# Patient Record
Sex: MALE | Race: WHITE | NOT HISPANIC OR LATINO | Employment: OTHER | URBAN - METROPOLITAN AREA
[De-identification: names, ages, dates, MRNs, and addresses within clinical notes are randomized per-mention and may not be internally consistent; named-entity substitution may affect disease eponyms.]

---

## 2017-02-07 ENCOUNTER — OFFICE VISIT (OUTPATIENT)
Dept: DERMATOLOGY | Facility: CLINIC | Age: 77
End: 2017-02-07
Payer: COMMERCIAL

## 2017-02-07 DIAGNOSIS — L82.0 INFLAMED SEBORRHEIC KERATOSIS: Primary | ICD-10-CM

## 2017-02-07 DIAGNOSIS — Z85.820 HISTORY OF MELANOMA: ICD-10-CM

## 2017-02-07 DIAGNOSIS — L57.0 AK (ACTINIC KERATOSIS): ICD-10-CM

## 2017-02-07 PROCEDURE — 17110 DESTRUCTION B9 LES UP TO 14: CPT | Performed by: DERMATOLOGY

## 2017-02-07 PROCEDURE — 99203 OFFICE O/P NEW LOW 30 MIN: CPT | Mod: 25 | Performed by: DERMATOLOGY

## 2017-02-07 PROCEDURE — 17003 DESTRUCT PREMALG LES 2-14: CPT | Performed by: DERMATOLOGY

## 2017-02-07 PROCEDURE — 17000 DESTRUCT PREMALG LESION: CPT | Mod: 59 | Performed by: DERMATOLOGY

## 2017-02-07 NOTE — LETTER
2/7/2017      RE: Cleveland Long  [address] [facility] [address]       DERMATOLOGY CLINIC VISIT NOTE      DERMATOLOGY PROBLEM LIST:   1.  History of malignant melanoma on right zygomatic arch, removed by Dr. Junior in approximately the year 2000.  The patient followed with every 3-month skin checks for several years with Dr. Junior and Dr. Cassidy.   2.  Actinic keratoses, treated with cryotherapy.   3.  Seborrheic keratoses.   4.  Cherry angiomas.      HISTORY OF PRESENT ILLNESS:  Mr. Long is a 76-year-old male presenting to Dermatology Clinic for an initial skin check.  As in the dermatology problem list, he has past history of malignant melanoma.  The patient is unsure of the depth of this melanoma.  It was excised approximately 15 years ago.  The patient denies need for sentinel lymph node biopsy.  He states that he followed with frequent skin checks for the first several years after treatment.  He has not had a recent skin check and was advised by Dr. Dixon to resume this practice.      Mr. Long notes an itching bump on his clavicular area.  He denies any other bleeding or painful spots.  No new lumps or bumps.  No new fatigue, weight loss or lymph node swelling.  He denies a history of nonmelanoma skin cancer.      Patient Active Problem List   Diagnosis     Solitary pulmonary nodule     Coronary atherosclerosis of native coronary artery     Hyperlipidemia LDL goal <100     Impaired fasting glucose     Left inguinal hernia     Hydrocele, left       No Known Allergies      Current Outpatient Prescriptions   Medication     atorvastatin (LIPITOR) 20 MG tablet     triamcinolone (KENALOG) 0.1 % cream     nystatin (MYCOSTATIN) cream     aspirin 81 MG tablet     No current facility-administered medications for this visit.        REVIEW OF SYSTEMS:  As noted, negative for fever, weight loss, fatigue, lumps or bumps.      SOCIAL HISTORY:  The patient is .  He lives in [place].  He has 2  granddaughters who are premed student at [employer].      PHYSICAL EXAMINATION:   GENERAL:  Mr. Long is a healthy-appearing 76-year-old male in no distress.   HEENT:  Conjunctivae are clear.   PULMONARY:  Breathing comfortably on room air.   SKIN:  Examination today included the scalp, face, neck, chest, abdomen, back, arms, legs, hands, feet, buttocks and genital area.  Skin exam was normal except for as follows:   -- Examination of the right zygomatic arch shows a healed linear scar without nodularity or pigmentation.   -- Scattered evenly pigmented light brown macules on the lateral cheeks, the superior shoulders, the upper back.   -- Waxy brown and tan papules and plaques on the anterior chest, upper and lower back.   -- Gritty papules on the right temple and the inner left preauricular cheek as well as the left helix.   -- Smooth-topped, firm, dome-shaped papule 3 mm on the left alar crease.   -- Diffuse xerosis of the arms and legs.   -- Scattered smooth-topped cherry red papules on the abdomen and chest.      ASSESSMENT AND PLAN:   1.  History of malignant melanoma greater than 15 years ago.  No evidence of recurrence since that time.  The patient advised again today on the importance of sun protection.  I suggested skin checks at least on a yearly basis, sooner if he notices new or changing spots.     2.  Cherry angiomas; benign vascular papules.     3.  Seborrheic keratoses; a single irritated keratosis on the central upper chest was treated with two 10-second freeze-thaw cycles.  Wound care instruction provided.     4.  Actinic keratoses; 3 actinic keratoses were treated today with a 10-second freeze-thaw cycle with liquid nitrogen.  Wound care instruction provided.      Mr. Long to return to Dermatology Clinic yearly or sooner if new or changing spots.     Mame Livingston MD  Dermatology Staff       cc:   Shayan Dixon MD   [facility]   [address] [facility]  [place] [address]               D: 2017 15:16   T: 2017 10:25   MT: KATHY      Name:     BRAYAN FONSECA   MRN:      [id number]        Account:      [id number]   :      1940           Service Date: 2017      Document: [id number]

## 2017-02-07 NOTE — MR AVS SNAPSHOT
After Visit Summary   2/7/2017    Cleveland Long    MRN: [id number]           Patient Information     Date Of Birth          1940        Visit Information        Provider Department      2/7/2017 1:45 PM Mame Livingston MD [facility]        Care Instructions                    Pediatric Dermatology  [facility]  [address]  1st [place]  [address]  Phone: [phone]    Pediatric & Adult Dermatology  [facility]  [address]   [address] [facility] [address]  [phone]                  General information: Dr. Mame Livingston is a board-certified dermatologist with subspecialty certification in pediatric dermatology.     Scheduling and Nurse Triage: Dr. Livingston sees pediatric patients on Mondays in [place] and adult and pediatric patients on Tuesdays in [place]. The remainder of the week she practices at the [facility]. Please call the above phone numbers to schedule or to talk to a nurse.     -For scheduling at the [place] or [place] locations, or to talk to the triage nurse please call the above phone number at the clinic where you were seen.     -For medication refills, please call your pharmacy.         Wound Care Instructions for Liquid Nitrogen Treatment    The treated areas may appear white at first. Over the next several hours a fluid-filled blister may form. The blister can be very dark. If a blister appears, it can remained blistered for up to a week, then scab over and heal.     Please leave the blistered area(s) uncovered as long as it remains closed. If the area(s) break open, you may cover it was a clean band-aid.     If the blistered area(s) become uncomfortably filled with fluid, you may release some of the fluid by puncturing the blister(s) with a needle that has been cleansed with alcohol.     If the skin covering the  blister comes off, clean the area(s) daily with soap and water. Apply a small amount of Vaseline and cover with a clean band-aid. Change the band-aid twice daily until the skin is healed.     Call us if....     You have signs of a infection such as yellow or pus-like drainage from the wound site or a fever over 100 degrees fahrenheit.     You have any questions or are not sure how to take care of the wound.        MOLES AND MELANOMA    Moles (nevi) are tan or brown, raised or flat areas of the skin which have an increased number of melanocytes. Melanocytes are the cells in our body which make pigment and account for skin color.     Some moles are present at birth (congenital nevi), while others come up later in life (acquired nevi). The sun can stimulate the body to make more moles. Sunburns are not the only thing that triggers more moles. Chronic sun exposure can do it too.    Malignant melanoma is a type of skin cancer that can be deadly if it spreads throughout the body. This incidence of melanoma in the [place] is growing faster than any other cancer. Melanoma usually grows near the surface of the skin for a period of time, and then begins to grow deeper into the skin. Once it grows deeper into the skin, the risk of spreading to other organs greatly increases. Therefore, early detection and removal of a malignant melanoma can result in a complete cure; removal after the tumor has spread may not be effective.    Melanoma can often be suspected by its appearance. The ABCDE s of melanoma are:    Asymmetry: Asymmetry means if you draw a line through the mole, the two halves do not match in color, size, shape or surface texture. Asymmetry can be a result of rapid enlargement of a mole, the development of a raised area on a previously flat lesion, scaling, ulceration, bleeding or scabbing within the mole.     Border: The border of a melanoma often blends into the normal skin and does not sharply delineate the  mole from normal skin.    Color: Different colors within a mole, or the development of dark black, blue, or red areas in a preexisting mole.    Diameter: Size greater than 0.6 cm (1/4 of an inch, or the size of a pencil eraser). This is only a guideline, and many normal moles may be this large or even a bit larger.    Evolving: Any change; in size, color, elevation, or another trait, or any new symptom such as bleeding, itching or crusting.    Melanomas should be considered when a mole suddenly appears or changes. Itching, burning, or pain in a pigmented lesion should cause suspicion, but most patients with early melanoma have no skin discomfort whatsoever. The appearance of a mole remains the most reliable method for identifying a malignant melanoma. Suspicious-looking moles may be removed for microscopic examination.    Melanoma can occur anywhere on the skin, including areas that are difficult for self-examination. May melanomas are first noticed by other family members.    Occasionally, melanomas appear as rapid growing, blue-black, dome-shaped bumps within a previous mole or previous area of normal skin.    Dysplastic moles are moles that fit the ABCDE rules of melanoma, but are not melanoma when examined under the microscope. They may indicate an increased risk of melanoma in that person. If there is a family history of melanoma, most experts agree that the person is at an increased risk for developing a melanoma. Experts still do not agree on what dysplastic moles mean in patients without a person or family history of melanoma. Dysplastic moles are usually larger than common moles and have different colors with irregular borders. The appearance can be very similar to a melanoma. Biopsies of dysplastic moles may show abnormalities which are different from a regular mole.     Sun sense and sun protection are barnhart to preventing melanoma. Avoid the sun and protect your skin when it is exposed to the sun. People  who live near the equator, people who have intermittent exposures to large amounts of sun, and people who have had sunburns in childhood or adolescence have an increased risk for melanoma.    Moles should be looked at regularly. Melanoma can be diagnosed early by self-examinations at home, looking for the ABCDE s of melanoma. It is impossible to memorize the way every single mole looks, but if you look at your child s moles once a month, most people can notice changes.              Follow-ups after your visit        Who to contact     If you have questions or need follow up information about today's clinic visit or your schedule please contact [facility] directly at [phone].  Normal or non-critical lab and imaging results will be communicated to you by MyChart, letter or phone within 4 business days after the clinic has received the results. If you do not hear from us within 7 days, please contact the clinic through [employer] or phone. If you have a critical or abnormal lab result, we will notify you by phone as soon as possible.  Submit refill requests through [employer] or call your pharmacy and they will forward the refill request to us. Please allow 3 business days for your refill to be completed.          Additional Information About Your Visit        [employer] Information     [employer] gives you secure access to your electronic health record. If you see a primary care provider, you can also send messages to your care team and make appointments. If you have questions, please call your primary care clinic.  If you do not have a primary care provider, please call [phone] and they will assist you.        Care EveryWhere ID     This is your Care EveryWhere ID. This could be used by other organizations to access your [facility] medical records  [id number]         Blood Pressure from Last 3 Encounters:   08/16/16 118/70   07/23/15 112/60   06/26/15 110/60    Weight from Last 3 Encounters:   08/16/16 186  lb (84.369 kg)   07/23/15 185 lb (83.915 kg)   06/26/15 177 lb (80.287 kg)              Today, you had the following     No orders found for display       Primary Care Provider Office Phone # Fax #    Shayan Dixon [phone]       [address] [place] [address]        Thank you!     Thank you for choosing [facility]  for your care. Our goal is always to provide you with excellent care. Hearing back from our patients is one way we can continue to improve our services. Please take a few minutes to complete the written survey that you may receive in the mail after your visit with us. Thank you!             Your Updated Medication List - Protect others around you: Learn how to safely use, store and throw away your medicines at www.disposemymeds.org.          This list is accurate as of: 2/7/17  2:12 PM.  Always use your most recent med list.                   Brand Name Dispense Instructions for use    aspirin 81 MG tablet      Take 1 tablet by mouth daily.       atorvastatin 20 MG tablet    LIPITOR    90 tablet    Take 1 tablet (20 mg) by mouth daily       nystatin cream    MYCOSTATIN    60 g    Apply topically 3 times daily       triamcinolone 0.1 % cream    KENALOG    30 g    Apply sparingly to affected area three times daily for 14 days.

## 2017-02-07 NOTE — NURSING NOTE
Chief Complaint   Patient presents with     Consult     Derm Problem     Full body scan       Initial There were no vitals taken for this visit. Estimated body mass index is 25.22 kg/(m^2) as calculated from the following:    Height as of 8/16/16: 6' (1.829 m).    Weight as of 8/16/16: 186 lb (84.369 kg).  Medication Reconciliation: complete   Deirdre Nava MA

## 2017-02-07 NOTE — PATIENT INSTRUCTIONS
Pediatric Dermatology  [facility]  [address]  1st [place]  [address]  Phone: [phone]    Pediatric & Adult Dermatology  [facility]  [address]   2nd [address]  [facility] [address]  [phone]                  General information: Dr. Mame Livingston is a board-certified dermatologist with subspecialty certification in pediatric dermatology.     Scheduling and Nurse Triage: Dr. Livingston sees pediatric patients on Mondays in [place] and adult and pediatric patients on Tuesdays in [place]. The remainder of the week she practices at the [facility]. Please call the above phone numbers to schedule or to talk to a nurse.     -For scheduling at the [place] or [place] locations, or to talk to the triage nurse please call the above phone number at the clinic where you were seen.     -For medication refills, please call your pharmacy.         Wound Care Instructions for Liquid Nitrogen Treatment    The treated areas may appear white at first. Over the next several hours a fluid-filled blister may form. The blister can be very dark. If a blister appears, it can remained blistered for up to a week, then scab over and heal.     Please leave the blistered area(s) uncovered as long as it remains closed. If the area(s) break open, you may cover it was a clean band-aid.     If the blistered area(s) become uncomfortably filled with fluid, you may release some of the fluid by puncturing the blister(s) with a needle that has been cleansed with alcohol.     If the skin covering the blister comes off, clean the area(s) daily with soap and water. Apply a small amount of Vaseline and cover with a clean band-aid. Change the band-aid twice daily until the skin is healed.     Call us if....     You have signs of a infection such as yellow or pus-like drainage from the wound site or a  fever over 100 degrees fahrenheit.     You have any questions or are not sure how to take care of the wound.        MOLES AND MELANOMA    Moles (nevi) are tan or brown, raised or flat areas of the skin which have an increased number of melanocytes. Melanocytes are the cells in our body which make pigment and account for skin color.     Some moles are present at birth (congenital nevi), while others come up later in life (acquired nevi). The sun can stimulate the body to make more moles. Sunburns are not the only thing that triggers more moles. Chronic sun exposure can do it too.    Malignant melanoma is a type of skin cancer that can be deadly if it spreads throughout the body. This incidence of melanoma in the [place] is growing faster than any other cancer. Melanoma usually grows near the surface of the skin for a period of time, and then begins to grow deeper into the skin. Once it grows deeper into the skin, the risk of spreading to other organs greatly increases. Therefore, early detection and removal of a malignant melanoma can result in a complete cure; removal after the tumor has spread may not be effective.    Melanoma can often be suspected by its appearance. The ABCDE s of melanoma are:    Asymmetry: Asymmetry means if you draw a line through the mole, the two halves do not match in color, size, shape or surface texture. Asymmetry can be a result of rapid enlargement of a mole, the development of a raised area on a previously flat lesion, scaling, ulceration, bleeding or scabbing within the mole.     Border: The border of a melanoma often blends into the normal skin and does not sharply delineate the mole from normal skin.    Color: Different colors within a mole, or the development of dark black, blue, or red areas in a preexisting mole.    Diameter: Size greater than 0.6 cm (1/4 of an inch, or the size of a pencil eraser). This is only a guideline, and many normal moles may be this large or even a  bit larger.    Evolving: Any change; in size, color, elevation, or another trait, or any new symptom such as bleeding, itching or crusting.    Melanomas should be considered when a mole suddenly appears or changes. Itching, burning, or pain in a pigmented lesion should cause suspicion, but most patients with early melanoma have no skin discomfort whatsoever. The appearance of a mole remains the most reliable method for identifying a malignant melanoma. Suspicious-looking moles may be removed for microscopic examination.    Melanoma can occur anywhere on the skin, including areas that are difficult for self-examination. May melanomas are first noticed by other family members.    Occasionally, melanomas appear as rapid growing, blue-black, dome-shaped bumps within a previous mole or previous area of normal skin.    Dysplastic moles are moles that fit the ABCDE rules of melanoma, but are not melanoma when examined under the microscope. They may indicate an increased risk of melanoma in that person. If there is a family history of melanoma, most experts agree that the person is at an increased risk for developing a melanoma. Experts still do not agree on what dysplastic moles mean in patients without a person or family history of melanoma. Dysplastic moles are usually larger than common moles and have different colors with irregular borders. The appearance can be very similar to a melanoma. Biopsies of dysplastic moles may show abnormalities which are different from a regular mole.     Sun sense and sun protection are barnhart to preventing melanoma. Avoid the sun and protect your skin when it is exposed to the sun. People who live near the equator, people who have intermittent exposures to large amounts of sun, and people who have had sunburns in childhood or adolescence have an increased risk for melanoma.    Moles should be looked at regularly. Melanoma can be diagnosed early by self-examinations at home, looking for the  ADIEL bates of melanoma. It is impossible to memorize the way every single mole looks, but if you look at your child s moles once a month, most people can notice changes.

## 2017-02-08 NOTE — PROGRESS NOTES
DERMATOLOGY CLINIC VISIT NOTE      DERMATOLOGY PROBLEM LIST:   1.  History of malignant melanoma on right zygomatic arch, removed by Dr. Junior in approximately the year 2000.  The patient followed with every 3-month skin checks for several years with Dr. Junior and Dr. Cassidy.   2.  Actinic keratoses, treated with cryotherapy.   3.  Seborrheic keratoses.   4.  Cherry angiomas.      HISTORY OF PRESENT ILLNESS:  Mr. Long is a 76-year-old male presenting to Dermatology Clinic for an initial skin check.  As in the dermatology problem list, he has past history of malignant melanoma.  The patient is unsure of the depth of this melanoma.  It was excised approximately 15 years ago.  The patient denies need for sentinel lymph node biopsy.  He states that he followed with frequent skin checks for the first several years after treatment.  He has not had a recent skin check and was advised by Dr. Dixon to resume this practice.      Mr. Long notes an itching bump on his clavicular area.  He denies any other bleeding or painful spots.  No new lumps or bumps.  No new fatigue, weight loss or lymph node swelling.  He denies a history of nonmelanoma skin cancer.      Patient Active Problem List   Diagnosis     Solitary pulmonary nodule     Coronary atherosclerosis of native coronary artery     Hyperlipidemia LDL goal <100     Impaired fasting glucose     Left inguinal hernia     Hydrocele, left       No Known Allergies      Current Outpatient Prescriptions   Medication     atorvastatin (LIPITOR) 20 MG tablet     triamcinolone (KENALOG) 0.1 % cream     nystatin (MYCOSTATIN) cream     aspirin 81 MG tablet     No current facility-administered medications for this visit.        REVIEW OF SYSTEMS:  As noted, negative for fever, weight loss, fatigue, lumps or bumps.      SOCIAL HISTORY:  The patient is .  He lives in [place].  He has 2 granddaughters who are premed student at [employer].      PHYSICAL EXAMINATION:    GENERAL:  Mr. Long is a healthy-appearing 76-year-old male in no distress.   HEENT:  Conjunctivae are clear.   PULMONARY:  Breathing comfortably on room air.   SKIN:  Examination today included the scalp, face, neck, chest, abdomen, back, arms, legs, hands, feet, buttocks and genital area.  Skin exam was normal except for as follows:   -- Examination of the right zygomatic arch shows a healed linear scar without nodularity or pigmentation.   -- Scattered evenly pigmented light brown macules on the lateral cheeks, the superior shoulders, the upper back.   -- Waxy brown and tan papules and plaques on the anterior chest, upper and lower back.   -- Gritty papules on the right temple and the inner left preauricular cheek as well as the left helix.   -- Smooth-topped, firm, dome-shaped papule 3 mm on the left alar crease.   -- Diffuse xerosis of the arms and legs.   -- Scattered smooth-topped cherry red papules on the abdomen and chest.      ASSESSMENT AND PLAN:   1.  History of malignant melanoma greater than 15 years ago.  No evidence of recurrence since that time.  The patient advised again today on the importance of sun protection.  I suggested skin checks at least on a yearly basis, sooner if he notices new or changing spots.     2.  Cherry angiomas; benign vascular papules.     3.  Seborrheic keratoses; a single irritated keratosis on the central upper chest was treated with two 10-second freeze-thaw cycles.  Wound care instruction provided.     4.  Actinic keratoses; 3 actinic keratoses were treated today with a 10-second freeze-thaw cycle with liquid nitrogen.  Wound care instruction provided.      Mr. Long to return to Dermatology Clinic yearly or sooner if new or changing spots.     Farrah Livingston MD  Dermatology Staff       cc:   Shayan Dixon MD   [address] [address]         FARRAH LIVINGSTON MD             D: 02/07/2017 15:16   T: 02/08/2017  10:25   MT: KATHY      Name:     BRAYAN FONSECA   MRN:      [id number]        Account:      [id number]   :      1940           Service Date: 2017      Document: [id number]

## 2017-02-10 PROBLEM — Z85.820 HISTORY OF MELANOMA: Status: ACTIVE | Noted: 2017-02-10

## 2017-02-10 PROBLEM — L82.0 INFLAMED SEBORRHEIC KERATOSIS: Status: ACTIVE | Noted: 2017-02-10

## 2017-02-10 PROBLEM — L57.0 AK (ACTINIC KERATOSIS): Status: ACTIVE | Noted: 2017-02-10

## 2017-06-12 ENCOUNTER — RADIANT APPOINTMENT (OUTPATIENT)
Dept: GENERAL RADIOLOGY | Facility: CLINIC | Age: 77
End: 2017-06-12
Attending: PHYSICAL MEDICINE & REHABILITATION
Payer: COMMERCIAL

## 2017-06-12 ENCOUNTER — OFFICE VISIT (OUTPATIENT)
Dept: ORTHOPEDICS | Facility: CLINIC | Age: 77
End: 2017-06-12
Payer: COMMERCIAL

## 2017-06-12 VITALS
WEIGHT: 180 LBS | DIASTOLIC BLOOD PRESSURE: 62 MMHG | SYSTOLIC BLOOD PRESSURE: 124 MMHG | BODY MASS INDEX: 24.38 KG/M2 | HEIGHT: 72 IN

## 2017-06-12 DIAGNOSIS — M25.561 ARTHRALGIA OF RIGHT LOWER LEG: ICD-10-CM

## 2017-06-12 DIAGNOSIS — M25.561 RIGHT MEDIAL KNEE PAIN: Primary | ICD-10-CM

## 2017-06-12 PROCEDURE — 99203 OFFICE O/P NEW LOW 30 MIN: CPT | Performed by: PHYSICAL MEDICINE & REHABILITATION

## 2017-06-12 PROCEDURE — 73562 X-RAY EXAM OF KNEE 3: CPT | Mod: RT | Performed by: PHYSICAL MEDICINE & REHABILITATION

## 2017-06-12 NOTE — MR AVS SNAPSHOT
After Visit Summary   6/12/2017    Cleveland Long    MRN: [id number]           Patient Information     Date Of Birth          1940        Visit Information        Provider Department      6/12/2017 5:40 PM Shayan Mason DO [facility]        Today's Diagnoses     Right medial knee pain    -  1      Care Instructions    We addressed the following today:    1. Right knee pain    Activity modification as discussed  Physical therapy: [place] [facility] - [phone]  Topical Treatments: Ice  Over the counter medication: Acetaminophen (Tylenol) 1000 mg every 6 hours with food (Maximum of 3000mg/day)  Other specific instructions: Call if interested in a right knee intra-articular corticosteroid injection for further treatment purposes  Follow up in 4-6 weeks if no improvement of symptoms for further evaluation/medical care (sooner if needed; call direct clinic number [[phone]] at any time with questions or concerns)              Follow-ups after your visit        Additional Services     [facility] PT, HAND, AND CHIROPRACTIC REFERRAL       **This order will print in the [facility] Scheduling Office**    Physical Therapy, Hand Therapy and Chiropractic Care are available through:    *[facility]  *[facility]  *[facility]    Call one number to schedule at any of the above locations: [phone].    Your provider has referred you to: Physical Therapy at [facility] or [facility] - Abi Zhu, or Nabor Marcos    Indication/Reason for Referral: Knee Pain  Onset of Illness: 3 weeks  Therapy Orders: Evaluate and Treat  Special Programs: None  Special Request: None    Galina Mojica      Additional Comments for the Therapist or Chiropractor: Formal physical therapy - exercises to include quadriceps/hamstring/calf stretching/strengthening with range of motion exercises, manual therapy, hip mobilizations, and gait/balance training  with use of modalities as needed with home exercise prescription.    Please be aware that coverage of these services is subject to the terms and limitations of your health insurance plan.  Call member services at your health plan with any benefit or coverage questions.      Please bring the following to your appointment:    *Your personal calendar for scheduling future appointments  *Comfortable clothing                  Follow-up notes from your care team     Return in about 4 weeks (around 7/10/2017).      Who to contact     If you have questions or need follow up information about today's clinic visit or your schedule please contact [facility] SP[facility] MEDICINE directly at [phone].  Normal or non-critical lab and imaging results will be communicated to you by MyChart, letter or phone within 4 business days after the clinic has received the results. If you do not hear from us within 7 days, please contact the clinic through Near[employer] or phone. If you have a critical or abnormal lab result, we will notify you by phone as soon as possible.  Submit refill requests through [employer] or call your pharmacy and they will forward the refill request to us. Please allow 3 business days for your refill to be completed.          Additional Information About Your Visit        MyChart Information     [employer] gives you secure access to your electronic health record. If you see a primary care provider, you can also send messages to your care team and make appointments. If you have questions, please call your primary care clinic.  If you do not have a primary care provider, please call [phone] and they will assist you.        Care EveryWhere ID     This is your Care EveryWhere ID. This could be used by other organizations to access your [facility] medical records  [id number]        Your Vitals Were     Height BMI (Body Mass Index)                6' (1.829 m) 24.41 kg/m2           Blood Pressure from Last 3 Encounters:    06/12/17 124/62   08/16/16 118/70   07/23/15 112/60    Weight from Last 3 Encounters:   06/12/17 180 lb (81.6 kg)   08/16/16 186 lb (84.4 kg)   07/23/15 185 lb (83.9 kg)              We Performed the Following     [facility] PT, HAND, AND CHIROPRACTIC REFERRAL        Primary Care Provider Office Phone # Fax #    Shayan Dixon [phone]       [facility] [address] [place] [address]        Thank you!     Thank you for choosing [facility] SP[facility]  for your care. Our goal is always to provide you with excellent care. Hearing back from our patients is one way we can continue to improve our services. Please take a few minutes to complete the written survey that you may receive in the mail after your visit with us. Thank you!             Your Updated Medication List - Protect others around you: Learn how to safely use, store and throw away your medicines at www.disposemymeds.org.          This list is accurate as of: 6/12/17  6:45 PM.  Always use your most recent med list.                   Brand Name Dispense Instructions for use    aspirin 81 MG tablet      Take 1 tablet by mouth daily.       atorvastatin 20 MG tablet    LIPITOR    90 tablet    Take 1 tablet (20 mg) by mouth daily       nystatin cream    MYCOSTATIN    60 g    Apply topically 3 times daily       triamcinolone 0.1 % cream    KENALOG    30 g    Apply sparingly to affected area three times daily for 14 days.

## 2017-06-12 NOTE — PATIENT INSTRUCTIONS
We addressed the following today:    1. Right knee pain    Activity modification as discussed  Physical therapy: [facility] - [phone]  Topical Treatments: Ice  Over the counter medication: Acetaminophen (Tylenol) 1000 mg every 6 hours with food (Maximum of 3000mg/day)  Other specific instructions: Call if interested in a right knee intra-articular corticosteroid injection for further treatment purposes  Follow up in 4-6 weeks if no improvement of symptoms for further evaluation/medical care (sooner if needed; call direct clinic number [[phone]] at any time with questions or concerns)

## 2017-06-12 NOTE — NURSING NOTE
Chief Complaint   Patient presents with     Musculoskeletal Problem     acute R knee pain       Initial /62  Ht 6' (1.829 m)  Wt 180 lb (81.6 kg)  BMI 24.41 kg/m2 Estimated body mass index is 24.41 kg/(m^2) as calculated from the following:    Height as of this encounter: 6' (1.829 m).    Weight as of this encounter: 180 lb (81.6 kg).  Medication Reconciliation: complete     Reynold Lauren, ATC

## 2017-06-12 NOTE — Clinical Note
Dear Cleveland Nj saw me at [facility] on Jun 12, 2017.  Please refer to the visit note at your convenience and feel free to contact me should you have any questions.  Sincerely,  hSayan Mason DO, [facility]

## 2017-06-19 ENCOUNTER — THERAPY VISIT (OUTPATIENT)
Dept: PHYSICAL THERAPY | Facility: CLINIC | Age: 77
End: 2017-06-19
Payer: COMMERCIAL

## 2017-06-19 DIAGNOSIS — M25.561 KNEE PAIN, RIGHT: Primary | ICD-10-CM

## 2017-06-19 PROCEDURE — G8978 MOBILITY CURRENT STATUS: HCPCS | Mod: GP | Performed by: PHYSICAL THERAPIST

## 2017-06-19 PROCEDURE — 97161 PT EVAL LOW COMPLEX 20 MIN: CPT | Mod: GP | Performed by: PHYSICAL THERAPIST

## 2017-06-19 PROCEDURE — G8979 MOBILITY GOAL STATUS: HCPCS | Mod: GP | Performed by: PHYSICAL THERAPIST

## 2017-06-19 PROCEDURE — 97110 THERAPEUTIC EXERCISES: CPT | Mod: GP | Performed by: PHYSICAL THERAPIST

## 2017-06-19 ASSESSMENT — ACTIVITIES OF DAILY LIVING (ADL)
AS_A_RESULT_OF_YOUR_KNEE_INJURY,_HOW_WOULD_YOU_RATE_YOUR_CURRENT_LEVEL_OF_DAILY_ACTIVITY?: ABNORMAL
GO DOWN STAIRS: ACTIVITY IS FAIRLY DIFFICULT
KNEEL ON THE FRONT OF YOUR KNEE: ACTIVITY IS MINIMALLY DIFFICULT
GIVING WAY, BUCKLING OR SHIFTING OF KNEE: I DO NOT HAVE THE SYMPTOM
WALK: ACTIVITY IS SOMEWHAT DIFFICULT
GO UP STAIRS: ACTIVITY IS SOMEWHAT DIFFICULT
STIFFNESS: THE SYMPTOM AFFECTS MY ACTIVITY SLIGHTLY
STAND: ACTIVITY IS SOMEWHAT DIFFICULT
WEAKNESS: I DO NOT HAVE THE SYMPTOM
SWELLING: I DO NOT HAVE THE SYMPTOM
HOW_WOULD_YOU_RATE_THE_CURRENT_FUNCTION_OF_YOUR_KNEE_DURING_YOUR_USUAL_DAILY_ACTIVITIES_ON_A_SCALE_FROM_0_TO_100_WITH_100_BEING_YOUR_LEVEL_OF_KNEE_FUNCTION_PRIOR_TO_YOUR_INJURY_AND_0_BEING_THE_INABILITY_TO_PERFORM_ANY_OF_YOUR_USUAL_DAILY_ACTIVITIES?: 50
KNEE_ACTIVITY_OF_DAILY_LIVING_SCORE: 68.57
RISE FROM A CHAIR: ACTIVITY IS SOMEWHAT DIFFICULT
PAIN: THE SYMPTOM AFFECTS MY ACTIVITY MODERATELY
HOW_WOULD_YOU_RATE_THE_OVERALL_FUNCTION_OF_YOUR_KNEE_DURING_YOUR_USUAL_DAILY_ACTIVITIES?: ABNORMAL
SIT WITH YOUR KNEE BENT: ACTIVITY IS MINIMALLY DIFFICULT
SQUAT: ACTIVITY IS SOMEWHAT DIFFICULT
RAW_SCORE: 48
LIMPING: THE SYMPTOM AFFECTS MY ACTIVITY SLIGHTLY
KNEE_ACTIVITY_OF_DAILY_LIVING_SUM: 48

## 2017-06-19 NOTE — MR AVS SNAPSHOT
After Visit Summary   6/19/2017    Cleveland Long    MRN: [id number]           Patient Information     Date Of Birth          1940        Visit Information        Provider Department      6/19/2017 8:10 AM Nabor Marcos PT [facility]        Today's Diagnoses     Knee pain, right    -  1       Follow-ups after your visit        Your next 10 appointments already scheduled     Jun 26, 2017  8:10 AM CDT   KUSHAL Extremity with Nabor Marcos PT   [facility] ([facility]  )    [address] [place] [address]  [address]  [facility] [address]   [phone]            Jul 03, 2017  7:30 AM CDT   KUSHAL Extremity with Nabor Marcos PT   [facility] [facility] ([facility]  )    [address] [place] [address]  [address]  [facility] [address]   [phone]              Who to contact     If you have questions or need follow up information about today's clinic visit or your schedule please contact [facility] directly at [phone].  Normal or non-critical lab and imaging results will be communicated to you by [employer], letter or phone within 4 business days after the clinic has received the results. If you do not hear from us within 7 days, please contact the clinic through [employer] or phone. If you have a critical or abnormal lab result, we will notify you by phone as soon as possible.  Submit refill requests through [employer] or call your pharmacy and they will forward the refill request to us. Please allow 3 business days for your refill to be completed.          Additional Information About Your Visit        MyChart Information     [employer] gives you secure access to your electronic health record. If you see a primary care provider, you can also send messages to your care team and make appointments. If you have questions, please call your primary care clinic.  If you do not have a primary care provider, please call [phone] and  they will assist you.        Care EveryWhere ID     This is your Care EveryWhere ID. This could be used by other organizations to access your [place] medical records  [id number]         Blood Pressure from Last 3 Encounters:   06/12/17 124/62   08/16/16 118/70   07/23/15 112/60    Weight from Last 3 Encounters:   06/12/17 81.6 kg (180 lb)   08/16/16 84.4 kg (186 lb)   07/23/15 83.9 kg (185 lb)              We Performed the Following     HC PT EVAL, LOW COMPLEXITY     [facility] CERT REPORT     [facility] INITIAL EVAL REPORT     THERAPEUTIC EXERCISES        Primary Care Provider Office Phone # Fax #    Shayan Dixon [phone]       [address] [place] [address]        Thank you!     Thank you for choosing [facility] [place]  for your care. Our goal is always to provide you with excellent care. Hearing back from our patients is one way we can continue to improve our services. Please take a few minutes to complete the written survey that you may receive in the mail after your visit with us. Thank you!             Your Updated Medication List - Protect others around you: Learn how to safely use, store and throw away your medicines at www.disposemymeds.org.          This list is accurate as of: 6/19/17  8:51 AM.  Always use your most recent med list.                   Brand Name Dispense Instructions for use    aspirin 81 MG tablet      Take 1 tablet by mouth daily.       atorvastatin 20 MG tablet    LIPITOR    90 tablet    Take 1 tablet (20 mg) by mouth daily       nystatin cream    MYCOSTATIN    60 g    Apply topically 3 times daily       triamcinolone 0.1 % cream    KENALOG    30 g    Apply sparingly to affected area three times daily for 14 days.

## 2017-06-19 NOTE — PROGRESS NOTES
"Subjective:    Patient is a 77 year old male presenting with rehab right knee hpi. The history is provided by the patient. No  was used.   Cleveland Long is a 77 year old male with a right knee condition.      This is a new condition  Patient reports that he started to have right knee pain mostly anterior.  Patient reports that he started to garden about May 2017 and pain developed shortly after.   Pain 8/10.  .    Patient reports pain:  Anterior.    Pain is described as aching, stabbing and sharp and is intermittent      Symptoms are exacerbated by activity, walking, weight bearing, ascending stairs, standing, descending stairs and kneeling (standing up from sitting, moving in bed. ) and relieved by rest.  Since onset symptoms are unchanged.        General health as reported by patient is good.    Medical allergies: no.  Other surgeries include:  None reported.  Current medications:  None as reported by the patient.  Current occupation is [employer] (Sitting, and standing).  Gardening, likes to be active. .  Patient is working in normal job without restrictions.  Primary job tasks include:  Prolonged sitting.    Barriers include:  None as reported by the patient.    Red flags:  None as reported by the patient.                        Objective:          Flexibility/Screens:       Lower Extremity:  Decreased left lower extremity flexibility:Hip Flexors; IT Band; Quadriceps and Hamstrings    Decreased right lower extremity flexibility:  Hip Flexors; IT Band; Quadriceps and Hamstrings                                                      Knee Evaluation:  ROM:  Prom wnl knee: pain at end range of flexion and extension on right.  Crepitus with ROM.    Strength wnl knee: Fair control with 6\" step up on right.  Hip flexion 4/5, glute meidus 4/5, glute max 4/5 on right.  Knee extension 5/5 flexion 5/5 with minimal pain.                              General     ROS    Assessment/Plan:      Patient is a 77 " year old male with right side knee complaints.    Patient has the following significant findings with corresponding treatment plan.                Diagnosis 1:  Right Knee  Pain -  hot/cold therapy, self management, education, directional preference exercise and home program  Decreased ROM/flexibility - manual therapy, therapeutic exercise and home program  Decreased strength - therapeutic exercise, therapeutic activities and  HEP  Impaired muscle performance - neuro re-education and home program  Decreased function - therapeutic activities and home program    Therapy Evaluation Codes:   1) History comprised of:   Personal factors that impact the plan of care:      None.    Comorbidity factors that impact the plan of care are:      None.     Medications impacting care: None.  2) Examination of Body Systems comprised of:   Body structures and functions that impact the plan of care:      Knee.   Activity limitations that impact the plan of care are:      Squatting/kneeling, Stairs, Standing, Walking and Sleeping.  3) Clinical presentation characteristics are:   Stable/Uncomplicated.  4) Decision-Making    Low complexity using standardized patient assessment instrument and/or measureable assessment of functional outcome.  Cumulative Therapy Evaluation is: Low complexity.    Previous and current functional limitations:  (See Goal Flow Sheet for this information)    Short term and Long term goals: (See Goal Flow Sheet for this information)     Communication ability:  Patient appears to be able to clearly communicate and understand verbal and written communication and follow directions correctly.  Treatment Explanation - The following has been discussed with the patient:   RX ordered/plan of care  Anticipated outcomes  Possible risks and side effects  This patient would benefit from PT intervention to resume normal activities.   Rehab potential is excellent.    Frequency:  1 X week, once daily  Duration:  for 6  weeks  Discharge Plan:  Achieve all LTG.  Independent in home treatment program.  Reach maximal therapeutic benefit.    Please refer to the daily flowsheet for treatment today, total treatment time and time spent performing 1:1 timed codes.

## 2017-06-26 ENCOUNTER — THERAPY VISIT (OUTPATIENT)
Dept: PHYSICAL THERAPY | Facility: CLINIC | Age: 77
End: 2017-06-26
Payer: COMMERCIAL

## 2017-06-26 DIAGNOSIS — M25.561 KNEE PAIN, RIGHT: ICD-10-CM

## 2017-06-26 PROCEDURE — 97110 THERAPEUTIC EXERCISES: CPT | Mod: GP | Performed by: PHYSICAL THERAPIST

## 2017-06-27 ENCOUNTER — OFFICE VISIT (OUTPATIENT)
Dept: PEDIATRICS | Facility: CLINIC | Age: 77
End: 2017-06-27
Payer: COMMERCIAL

## 2017-06-27 VITALS
BODY MASS INDEX: 24.8 KG/M2 | SYSTOLIC BLOOD PRESSURE: 122 MMHG | OXYGEN SATURATION: 96 % | DIASTOLIC BLOOD PRESSURE: 58 MMHG | HEIGHT: 73 IN | HEART RATE: 69 BPM | TEMPERATURE: 98 F | WEIGHT: 187.1 LBS

## 2017-06-27 DIAGNOSIS — R58 ECCHYMOSIS: ICD-10-CM

## 2017-06-27 DIAGNOSIS — M25.561 ACUTE PAIN OF RIGHT KNEE: Primary | ICD-10-CM

## 2017-06-27 PROCEDURE — 99213 OFFICE O/P EST LOW 20 MIN: CPT | Performed by: PHYSICIAN ASSISTANT

## 2017-06-27 RX ORDER — NYSTATIN 100000 U/G
CREAM TOPICAL 3 TIMES DAILY
Qty: 60 G | Refills: 1 | Status: CANCELLED | OUTPATIENT
Start: 2017-06-27

## 2017-06-27 RX ORDER — MULTIPLE VITAMINS W/ MINERALS TAB 9MG-400MCG
1 TAB ORAL DAILY
COMMUNITY
End: 2024-03-03

## 2017-06-27 NOTE — NURSING NOTE
"Chief Complaint   Patient presents with     Derm Problem     possible lymes      Musculoskeletal Problem     right knee        Initial /58 (BP Location: Right arm, Patient Position: Chair, Cuff Size: Adult Regular)  Pulse 69  Temp 98  F (36.7  C) (Oral)  Ht 6' 1\" (1.854 m)  Wt 187 lb 1.6 oz (84.9 kg)  SpO2 96%  BMI 24.68 kg/m2 Estimated body mass index is 24.68 kg/(m^2) as calculated from the following:    Height as of this encounter: 6' 1\" (1.854 m).    Weight as of this encounter: 187 lb 1.6 oz (84.9 kg).  Medication Reconciliation: complete   Deirdre Nava MA      "

## 2017-06-27 NOTE — MR AVS SNAPSHOT
After Visit Summary   6/27/2017    Cleveland Long    MRN: [id number]           Patient Information     Date Of Birth          1940        Visit Information        Provider Department      6/27/2017 1:50 PM Frances Mcdaniel PA-C [facility]        Today's Diagnoses     Acute pain of right knee    -  1    Ecchymosis          Care Instructions    Follow up with Dr. Mason            Follow-ups after your visit        Your next 10 appointments already scheduled     Jun 30, 2017  9:40 AM CDT   Return Visit with Shayan Mason,    [facility] ([facility])    [address]  [address]  [facility] [address]   [phone]            Jul 03, 2017  7:30 AM CDT   KUSHAL Extremity with Nabor Marcos PT   [facility] [place] ([facility]  )    [address]  [address]  [facility] [address]   [phone]              Who to contact     If you have questions or need follow up information about today's clinic visit or your schedule please contact [facility] directly at [phone].  Normal or non-critical lab and imaging results will be communicated to you by InSp[employer], letter or phone within 4 business days after the clinic has received the results. If you do not hear from us within 7 days, please contact the clinic through [employer] or phone. If you have a critical or abnormal lab result, we will notify you by phone as soon as possible.  Submit refill requests through [employer] or call your pharmacy and they will forward the refill request to us. Please allow 3 business days for your refill to be completed.          Additional Information About Your Visit        MyChart Information     [employer] gives you secure access to your electronic health record. If you see a primary care provider, you can also send messages to your care team and make appointments. If you have questions, please call your primary care clinic.   "If you do not have a primary care provider, please call [phone] and they will assist you.        Care EveryWhere ID     This is your Care EveryWhere ID. This could be used by other organizations to access your [place] medical records  [id number]        Your Vitals Were     Pulse Temperature Height Pulse Oximetry BMI (Body Mass Index)       69 98  F (36.7  C) (Oral) 6' 1\" (1.854 m) 96% 24.68 kg/m2        Blood Pressure from Last 3 Encounters:   06/27/17 122/58   06/12/17 124/62   08/16/16 118/70    Weight from Last 3 Encounters:   06/27/17 187 lb 1.6 oz (84.9 kg)   06/12/17 180 lb (81.6 kg)   08/16/16 186 lb (84.4 kg)              Today, you had the following     No orders found for display       Primary Care Provider Office Phone # Fax #    Shayan Dixon [phone]       [facility] [address] [place] [address]        Equal Access to Services     [facility]: Hadii aad ku hadasho Soomaali, waaxda luqadaha, qaybta kaalmada adeegyada, radha garza . So [facility] [phone].    ATENCIÓN: Si habla español, tiene a delacruz disposición servicios gratuitos de asistencia lingüística. Llame al [phone].    We comply with applicable federal civil rights laws and [place] laws. We do not discriminate on the basis of race, color, national origin, age, disability sex, sexual orientation or gender identity.            Thank you!     Thank you for choosing [facility]  for your care. Our goal is always to provide you with excellent care. Hearing back from our patients is one way we can continue to improve our services. Please take a few minutes to complete the written survey that you may receive in the mail after your visit with us. Thank you!             Your Updated Medication List - Protect others around you: Learn how to safely use, store and throw away your medicines at www.disposemymeds.org.          This list is accurate as of: " 6/27/17  2:28 PM.  Always use your most recent med list.                   Brand Name Dispense Instructions for use Diagnosis    aspirin 81 MG tablet      Take 1 tablet by mouth daily.    Coronary atherosclerosis of native coronary artery       atorvastatin 20 MG tablet    LIPITOR    90 tablet    Take 1 tablet (20 mg) by mouth daily    Hyperlipidemia LDL goal <100       CO Q 10 PO           FISH OIL OMEGA-3 PO           Multi-vitamin Tabs tablet      Take 1 tablet by mouth daily        nystatin cream    MYCOSTATIN    60 g    Apply topically 3 times daily    Tinea cruris       triamcinolone 0.1 % cream    KENALOG    30 g    Apply sparingly to affected area three times daily for 14 days.    Rash and nonspecific skin eruption       VITAMIN C PO           VITAMIN D-3 PO           VITAMIN E PO

## 2017-06-28 ENCOUNTER — OFFICE VISIT (OUTPATIENT)
Dept: ORTHOPEDICS | Facility: CLINIC | Age: 77
End: 2017-06-28
Payer: COMMERCIAL

## 2017-06-28 VITALS
HEIGHT: 73 IN | SYSTOLIC BLOOD PRESSURE: 113 MMHG | BODY MASS INDEX: 24.78 KG/M2 | DIASTOLIC BLOOD PRESSURE: 65 MMHG | WEIGHT: 187 LBS

## 2017-06-28 DIAGNOSIS — M25.561 RIGHT MEDIAL KNEE PAIN: Primary | ICD-10-CM

## 2017-06-28 PROCEDURE — 20610 DRAIN/INJ JOINT/BURSA W/O US: CPT | Mod: RT | Performed by: PHYSICAL MEDICINE & REHABILITATION

## 2017-06-28 RX ORDER — METHYLPREDNISOLONE ACETATE 40 MG/ML
40 INJECTION, SUSPENSION INTRA-ARTICULAR; INTRALESIONAL; INTRAMUSCULAR; SOFT TISSUE ONCE
Qty: 1 ML | Refills: 0 | OUTPATIENT
Start: 2017-06-28 | End: 2017-06-28

## 2017-06-28 RX ORDER — LIDOCAINE HYDROCHLORIDE 10 MG/ML
4 INJECTION, SOLUTION INFILTRATION; PERINEURAL ONCE
Qty: 4 ML | Refills: 0 | OUTPATIENT
Start: 2017-06-28 | End: 2017-06-28

## 2017-06-28 NOTE — MR AVS SNAPSHOT
After Visit Summary   6/28/2017    Cleveland Long    MRN: [id number]           Patient Information     Date Of Birth          1940        Visit Information        Provider Department      6/28/2017 1:00 PM Shayan Mason DO [facility]        Today's Diagnoses     Right medial knee pain    -  1      Care Instructions    We addressed the following today:    1. Acute right knee pain    Activity modification as discussed  Home exercise program as instructed  Physical therapy: [facility]   Over the counter medication: Acetaminophen (Tylenol) 1000 mg every 6 hours with food (Maximum of 3000mg/day)  Steroid injection of the right knee was performed today in clinic  Icing for the next 1-2 days may be helpful for pain. Injection may take 10-14 days to see the full effect  Call/follow-up in 4 weeks if no improvement of symptoms for further evaluation/medical care (sooner if needed; call direct clinic number [[phone]] at any time with questions or concerns)              Follow-ups after your visit        Your next 10 appointments already scheduled     Jul 03, 2017  7:30 AM BULMAROT   KUSHAL Extremity with Nabor Marcos PT   [facility] [place] ([facility] [place]  )    [address] [place] [id number]   [phone]              Who to contact     If you have questions or need follow up information about today's clinic visit or your schedule please contact [facility] directly at [phone].  Normal or non-critical lab and imaging results will be communicated to you by MyChart, letter or phone within 4 business days after the clinic has received the results. If you do not hear from us within 7 days, please contact the clinic through MyChart or phone. If you have a critical or abnormal lab result, we will notify you by phone as soon as possible.  Submit refill requests through Iotum or call your pharmacy and  "they will forward the refill request to us. Please allow 3 business days for your refill to be completed.          Additional Information About Your Visit        [facility] Information     Redux gives you secure access to your electronic health record. If you see a primary care provider, you can also send messages to your care team and make appointments. If you have questions, please call your primary care clinic.  If you do not have a primary care provider, please call [phone] and they will assist you.        Care EveryWhere ID     This is your Care EveryWhere ID. This could be used by other organizations to access your [facility] medical records  [id number]        Your Vitals Were     Height BMI (Body Mass Index)                6' 1\" (1.854 m) 24.67 kg/m2           Blood Pressure from Last 3 Encounters:   06/28/17 113/65   06/27/17 122/58   06/12/17 124/62    Weight from Last 3 Encounters:   06/28/17 187 lb (84.8 kg)   06/27/17 187 lb 1.6 oz (84.9 kg)   06/12/17 180 lb (81.6 kg)              We Performed the Following     DRAIN/INJECT LARGE JOINT/BURSA     METHYLPREDNISOLONE 40 MG INJ          Today's Medication Changes          These changes are accurate as of: 6/28/17  1:18 PM.  If you have any questions, ask your nurse or doctor.               Start taking these medicines.        Dose/Directions    lidocaine 1 % injection   Started by:  Shayan Mason DO        Dose:  4 mL   4 mLs by INTRA-ARTICULAR route once for 1 dose   Quantity:  4 mL   Refills:  0       methylPREDNISolone acetate 40 MG/ML injection   Commonly known as:  DEPO-MEDROL   Started by:  Shayan Mason DO        Dose:  40 mg   1 mL (40 mg) by INTRA-ARTICULAR route once for 1 dose   Quantity:  1 mL   Refills:  0            Where to get your medicines      Some of these will need a paper prescription and others can be bought over the counter.  Ask your nurse if you have questions.     You don't need a prescription for these medications     " lidocaine 1 % injection    methylPREDNISolone acetate 40 MG/ML injection                Primary Care Provider Office Phone # Fax #    Shayan Dixon [phone]       [place] [facility] [address] [place] [address]        Equal Access to Services     MANOJ MARTIN : Hadii aad ku hadbijuo Soomaali, waaxda luqadaha, qaybta kaalmada adeegyada, radha sierra hayshawnn rodriguezshana lan lagloriadeon robison. So [facility] [phone].    ATENCIÓN: Si habla español, tiene a delacruz disposición servicios gratuitos de asistencia lingüística. Llame al [phone].    We comply with applicable federal civil rights laws and [place] laws. We do not discriminate on the basis of race, color, national origin, age, disability sex, sexual orientation or gender identity.            Thank you!     Thank you for choosing [facility]  for your care. Our goal is always to provide you with excellent care. Hearing back from our patients is one way we can continue to improve our services. Please take a few minutes to complete the written survey that you may receive in the mail after your visit with us. Thank you!             Your Updated Medication List - Protect others around you: Learn how to safely use, store and throw away your medicines at www.disposemymeds.org.          This list is accurate as of: 6/28/17  1:18 PM.  Always use your most recent med list.                   Brand Name Dispense Instructions for use Diagnosis    aspirin 81 MG tablet      Take 1 tablet by mouth daily.    Coronary atherosclerosis of native coronary artery       atorvastatin 20 MG tablet    LIPITOR    90 tablet    Take 1 tablet (20 mg) by mouth daily    Hyperlipidemia LDL goal <100       CO Q 10 PO           FISH OIL OMEGA-3 PO           lidocaine 1 % injection     4 mL    4 mLs by INTRA-ARTICULAR route once for 1 dose        methylPREDNISolone acetate 40 MG/ML injection    DEPO-MEDROL    1 mL    1 mL (40 mg) by INTRA-ARTICULAR  route once for 1 dose        Multi-vitamin Tabs tablet      Take 1 tablet by mouth daily        nystatin cream    MYCOSTATIN    60 g    Apply topically 3 times daily    Tinea cruris       triamcinolone 0.1 % cream    KENALOG    30 g    Apply sparingly to affected area three times daily for 14 days.    Rash and nonspecific skin eruption       VITAMIN C PO           VITAMIN D-3 PO           VITAMIN E PO

## 2017-06-28 NOTE — PROGRESS NOTES
Right Knee Intra-Articular Corticosteroid Injection    Benefits, risks, and alternatives of the procedure were discussed with the patient, including bleeding, infection, hyperglycemia, localized lipodystrophy and hypopigmentation.  Patient elected to proceed and informed consent was signed.    Area was prepped with ChloraPrep and Ethyl Chloride was used for topical anesthetic purposes.  Using standard precautions, a 25-gauge 2-inch needle was used to inject 4 cc of 1% Lidocaine without Epinephrine and 1 cc of Depo Medrol (40 mg/ml) into the intra-articular space of the right knee via a supine, superolateral approach.  Patient tolerated the procedure well and there were no complications.     ASSESSMENT:  1. Acute right medial knee pain - differential diagnoses includes medial mensicus degeneration/tear, knee osteoarthrosis, pes anserine bursitis, etc.     PLAN:  1. Activity modification as discussed, including limitation of activities that cause pain/discomfort.  2. Acetaminophen/ice as needed for improved pain control.  3. Continue with formal physical therapy/pain-free home exercise program as previously prescribed during formal physical therapy.   4. Call/follow-up in 4 weeks if no improvement of symptoms for further evaluation/medical care.   Consider MRI of the right knee without contrast, right pes anserine bursa corticosteroid injection, etc. as deemed appropriate moving forward. Instructed to contact our office should the condition evolve or worsen.     Patient's conditions were thoroughly discussed during today's visit with greater than 50% of the visit spent counseling the patient with total time spent face-to-face with the patient being 15 minutes.     Shayan Mason DO, [facility]  [facility]     Disclaimer: This note consists of symbols derived from keyboarding, dictation and/or voice recognition software. As a result, there may be errors in the script that have gone undetected. Please  consider this when interpreting information found in this chart.

## 2017-06-28 NOTE — NURSING NOTE
"Chief Complaint   Patient presents with     RECHECK     R knee        Initial /65  Ht 6' 1\" (1.854 m)  Wt 187 lb (84.8 kg)  BMI 24.67 kg/m2 Estimated body mass index is 24.67 kg/(m^2) as calculated from the following:    Height as of this encounter: 6' 1\" (1.854 m).    Weight as of this encounter: 187 lb (84.8 kg).  Medication Reconciliation: complete     Reynold Lauren ATC      "

## 2017-06-28 NOTE — PATIENT INSTRUCTIONS
We addressed the following today:    1. Acute right knee pain    Activity modification as discussed  Home exercise program as instructed  Physical therapy: [facility]   Over the counter medication: Acetaminophen (Tylenol) 1000 mg every 6 hours with food (Maximum of 3000mg/day)  Steroid injection of the right knee was performed today in clinic  Icing for the next 1-2 days may be helpful for pain. Injection may take 10-14 days to see the full effect  Call/follow-up in 4 weeks if no improvement of symptoms for further evaluation/medical care (sooner if needed; call direct clinic number [[phone]] at any time with questions or concerns)

## 2017-06-28 NOTE — Clinical Note
Dear Cleveland Nj saw me at [facility] on Jun 28, 2017.  Please refer to the visit note at your convenience and feel free to contact me should you have any questions.  Sincerely,  Shayan Mason DO, CAKOURTNEY [facility]

## 2017-06-28 NOTE — PROGRESS NOTES
" [facility]   Follow-up Visit s Jun 28, 2017    Subjective:  Cleveland Long is a 77 year old male who is seen in follow up for evaluation of acute right medial knee pain.  Last visit was on 6/12/2017.  Reports that he started physical therapy and felt like the first visit helped but doing his exercises after the second visit seemed to flare his pain. He has discontinued doing his physical therapy and would like to discuss whether he should return or not. Symptoms are worse with driving.     Denies any trauma/falls since last clinical visit.    Patient's past medical, surgical, social, and family histories are reviewed today    Objective:  /65  Ht 6' 1\" (1.854 m)  Wt 187 lb (84.8 kg)  BMI 24.67 kg/m2  General: healthy, alert and no distress  Skin: no suspicious lesions or rashes  Neuro: sensory and motor exam grossly normal with no focal neurologic deficits appreciated    MSK:    ***    Imaging:  {SHOULDER XRAY MODIFIED:236168::\"No x-rays indicated during today's visit.\"}  {BACK XR MODIFIED:752341}  {KNEE XRAY MODIFIED:018220}  {FOOT XR MODIFIED:489682::\"No x-rays indicated during today's visit\"}    ASSESSMENT:  1. ***    PLAN:  1. ***.  2. ***.  3. ***.  4. ***.  5. ***.  6. Follow-up in *** weeks for further evaluation/medical care.  If asymptomatic, can follow-up as needed.  Instructed to follow-up if change of symptoms arise.    Patient's conditions were thoroughly discussed during today's visit with greater than 50% of the visit spent counseling the patient with total time spent face-to-face with the patient being *** minutes.    Shayan Mason DO, CAQSM  [facility]    Disclaimer: This note consists of symbols derived from keyboarding, dictation and/or voice recognition software. As a result, there may be errors in the script that have gone undetected. Please consider this when interpreting information found in this chart.        "

## 2017-07-03 ENCOUNTER — THERAPY VISIT (OUTPATIENT)
Dept: PHYSICAL THERAPY | Facility: CLINIC | Age: 77
End: 2017-07-03
Payer: COMMERCIAL

## 2017-07-03 DIAGNOSIS — M25.561 ACUTE PAIN OF RIGHT KNEE: ICD-10-CM

## 2017-07-03 PROCEDURE — 97110 THERAPEUTIC EXERCISES: CPT | Mod: GP | Performed by: PHYSICAL THERAPIST

## 2017-07-03 NOTE — MR AVS SNAPSHOT
After Visit Summary   7/3/2017    Cleveland Long    MRN: [id number]           Patient Information     Date Of Birth          1940        Visit Information        Provider Department      7/3/2017 7:30 AM Nabor Marcos PT [facility]        Today's Diagnoses     Acute pain of right knee           Follow-ups after your visit        Who to contact     If you have questions or need follow up information about today's clinic visit or your schedule please contact [facility] directly at [phone].  Normal or non-critical lab and imaging results will be communicated to you by Souktelhart, letter or phone within 4 business days after the clinic has received the results. If you do not hear from us within 7 days, please contact the clinic through SoundSenasationt or phone. If you have a critical or abnormal lab result, we will notify you by phone as soon as possible.  Submit refill requests through Funambol or call your pharmacy and they will forward the refill request to us. Please allow 3 business days for your refill to be completed.          Additional Information About Your Visit        MyChart Information     Funambol gives you secure access to your electronic health record. If you see a primary care provider, you can also send messages to your care team and make appointments. If you have questions, please call your primary care clinic.  If you do not have a primary care provider, please call [phone] and they will assist you.        Care EveryWhere ID     This is your Care EveryWhere ID. This could be used by other organizations to access your [facility] medical records  [id number]         Blood Pressure from Last 3 Encounters:   06/28/17 113/65   06/27/17 122/58   06/12/17 124/62    Weight from Last 3 Encounters:   06/28/17 84.8 kg (187 lb)   06/27/17 84.9 kg (187 lb 1.6 oz)   06/12/17 81.6 kg (180 lb)              We Performed the Following     THERAPEUTIC  [facility]        Primary Care Provider Office Phone # Fax #    Shayan Dixon [phone]       [place] [facility] [address] [place] [address]        Equal Access to Services     MANOJ MARITN : Hadbibi valdez lakeo Soamandaali, waaxda luqadaha, qaybta kaalmada aderobert, radha garciamitchell robison. So [facility] [phone].    ATENCIÓN: Si habla español, tiene a delacruz disposición servicios gratuitos de asistencia lingüística. [facility] [phone].    We comply with applicable federal civil rights laws and [place] laws. We do not discriminate on the basis of race, color, national origin, age, disability sex, sexual orientation or gender identity.            Thank you!     Thank you for choosing [facility]  for your care. Our goal is always to provide you with excellent care. Hearing back from our patients is one way we can continue to improve our services. Please take a few minutes to complete the written survey that you may receive in the mail after your visit with us. Thank you!             Your Updated Medication List - Protect others around you: Learn how to safely use, store and throw away your medicines at www.disposemymeds.org.          This list is accurate as of: 7/3/17  7:57 AM.  Always use your most recent med list.                   Brand Name Dispense Instructions for use Diagnosis    aspirin 81 MG tablet      Take 1 tablet by mouth daily.    Coronary atherosclerosis of native coronary artery       atorvastatin 20 MG tablet    LIPITOR    90 tablet    Take 1 tablet (20 mg) by mouth daily    Hyperlipidemia LDL goal <100       CO Q 10 PO           FISH OIL OMEGA-3 PO           Multi-vitamin Tabs tablet      Take 1 tablet by mouth daily        nystatin cream    MYCOSTATIN    60 g    Apply topically 3 times daily    Tinea cruris       triamcinolone 0.1 % cream    KENALOG    30 g    Apply sparingly to affected area three times daily for 14  days.    Rash and nonspecific skin eruption       VITAMIN C PO           VITAMIN D-3 PO           VITAMIN E PO

## 2017-08-22 DIAGNOSIS — E78.5 HYPERLIPIDEMIA LDL GOAL <100: ICD-10-CM

## 2017-08-22 RX ORDER — ATORVASTATIN CALCIUM 20 MG/1
TABLET, FILM COATED ORAL
Qty: 31 TABLET | Refills: 0 | Status: SHIPPED | OUTPATIENT
Start: 2017-08-22 | End: 2017-09-11

## 2017-08-22 NOTE — TELEPHONE ENCOUNTER
atorvastatin (LIPITOR) 20 MG tablet     Last Written Prescription Date: 8/16/2016  Last Fill Quantity: 90, # refills: 3  Last Office Visit with [facility], [facility] or [facility] prescribing provider: 8/16/2016  Next 5 appointments (look out 90 days)     Sep 11, 2017  8:10 AM CDT   PHYSICAL with Shayan Dixon MD   [facility]    [address] [place] [id number] [phone]                   Lab Results   Component Value Date    CHOL 146 08/20/2016     Lab Results   Component Value Date    HDL 75 08/20/2016     Lab Results   Component Value Date    LDL 60 08/20/2016     Lab Results   Component Value Date    TRIG 57 08/20/2016     Lab Results   Component Value Date    CHOLHDLRATIO 1.7 03/13/2015

## 2017-08-22 NOTE — TELEPHONE ENCOUNTER
Medication is being filled for 1 time refill only due to:  Patient needs to be seen because it has been more than one year since last visit. Pt has appointment scheduled for next month already    Rosalia Herrera RN

## 2017-09-08 ENCOUNTER — MYC MEDICAL ADVICE (OUTPATIENT)
Dept: PEDIATRICS | Facility: CLINIC | Age: 77
End: 2017-09-08

## 2017-09-11 ENCOUNTER — OFFICE VISIT (OUTPATIENT)
Dept: PEDIATRICS | Facility: CLINIC | Age: 77
End: 2017-09-11
Payer: COMMERCIAL

## 2017-09-11 VITALS
WEIGHT: 183 LBS | OXYGEN SATURATION: 97 % | SYSTOLIC BLOOD PRESSURE: 106 MMHG | TEMPERATURE: 97.9 F | BODY MASS INDEX: 24.14 KG/M2 | DIASTOLIC BLOOD PRESSURE: 60 MMHG | HEART RATE: 61 BPM

## 2017-09-11 DIAGNOSIS — Z00.00 ROUTINE GENERAL MEDICAL EXAMINATION AT A HEALTH CARE FACILITY: Primary | ICD-10-CM

## 2017-09-11 DIAGNOSIS — E78.5 HYPERLIPIDEMIA LDL GOAL <100: ICD-10-CM

## 2017-09-11 DIAGNOSIS — I25.10 ATHEROSCLEROSIS OF NATIVE CORONARY ARTERY OF NATIVE HEART WITHOUT ANGINA PECTORIS: ICD-10-CM

## 2017-09-11 DIAGNOSIS — Z23 NEED FOR PROPHYLACTIC VACCINATION AND INOCULATION AGAINST INFLUENZA: ICD-10-CM

## 2017-09-11 LAB
ALBUMIN SERPL-MCNC: 3.5 G/DL (ref 3.4–5)
ALP SERPL-CCNC: 43 U/L (ref 40–150)
ALT SERPL W P-5'-P-CCNC: 34 U/L (ref 0–70)
ANION GAP SERPL CALCULATED.3IONS-SCNC: 8 MMOL/L (ref 3–14)
AST SERPL W P-5'-P-CCNC: 24 U/L (ref 0–45)
BILIRUB SERPL-MCNC: 1.8 MG/DL (ref 0.2–1.3)
BUN SERPL-MCNC: 14 MG/DL (ref 7–30)
CALCIUM SERPL-MCNC: 9.4 MG/DL (ref 8.5–10.1)
CHLORIDE SERPL-SCNC: 106 MMOL/L (ref 94–109)
CHOLEST SERPL-MCNC: 134 MG/DL
CO2 SERPL-SCNC: 26 MMOL/L (ref 20–32)
CREAT SERPL-MCNC: 0.95 MG/DL (ref 0.66–1.25)
GFR SERPL CREATININE-BSD FRML MDRD: 76 ML/MIN/1.7M2
GLUCOSE SERPL-MCNC: 96 MG/DL (ref 70–99)
HDLC SERPL-MCNC: 73 MG/DL
LDLC SERPL CALC-MCNC: 47 MG/DL
NONHDLC SERPL-MCNC: 61 MG/DL
POTASSIUM SERPL-SCNC: 4 MMOL/L (ref 3.4–5.3)
PROT SERPL-MCNC: 6.7 G/DL (ref 6.8–8.8)
SODIUM SERPL-SCNC: 140 MMOL/L (ref 133–144)
TRIGL SERPL-MCNC: 71 MG/DL

## 2017-09-11 PROCEDURE — 36415 COLL VENOUS BLD VENIPUNCTURE: CPT | Performed by: INTERNAL MEDICINE

## 2017-09-11 PROCEDURE — G0008 ADMIN INFLUENZA VIRUS VAC: HCPCS | Performed by: INTERNAL MEDICINE

## 2017-09-11 PROCEDURE — 90662 IIV NO PRSV INCREASED AG IM: CPT | Performed by: INTERNAL MEDICINE

## 2017-09-11 PROCEDURE — 99397 PER PM REEVAL EST PAT 65+ YR: CPT | Mod: 25 | Performed by: INTERNAL MEDICINE

## 2017-09-11 PROCEDURE — 80053 COMPREHEN METABOLIC PANEL: CPT | Performed by: INTERNAL MEDICINE

## 2017-09-11 PROCEDURE — 80061 LIPID PANEL: CPT | Performed by: INTERNAL MEDICINE

## 2017-09-11 RX ORDER — ATORVASTATIN CALCIUM 20 MG/1
20 TABLET, FILM COATED ORAL DAILY
Qty: 90 TABLET | Refills: 3 | Status: SHIPPED | OUTPATIENT
Start: 2017-09-11 | End: 2018-09-12

## 2017-09-11 NOTE — MR AVS SNAPSHOT
After Visit Summary   9/11/2017    [place] Long    MRN: [id number]           Patient Information     Date Of Birth          1940        Visit Information        Provider Department      9/11/2017 8:10 AM Shayan Dixon MD [facility]        Today's Diagnoses     Routine general medical examination at a health care facility    -  1    Need for prophylactic vaccination and inoculation against influenza        Hyperlipidemia LDL goal <100        Atherosclerosis of native coronary artery of native heart without angina pectoris          Care Instructions      Preventive Health Recommendations:     Yearly exam:             See your health care provider every year in order to  o   Review health changes.   o   Discuss preventive care.    o   Review your medicines.    Every year, have a diabetes test (fasting glucose).    Every year, have a cholesterol test.   Shots:     Get a flu shot each year.     Get a tetanus shot every 10 years.   Nutrition:     Eat at least 5 servings of fruits and vegetables each day.     Eat whole-grain bread, whole-wheat pasta and brown rice instead of white grains and rice.     Get adequate Calcium and Vitamin D.   Lifestyle    Exercise for at least 150 minutes a week (30 minutes a day, 5 days a week). This will help you control your weight and prevent disease.     Limit alcohol to one drink per day.     No smoking.     Wear sunscreen to prevent skin cancer.     See your dentist every six months for an exam and cleaning.     See your eye doctor every 1 to 2 years to screen for conditions such as glaucoma, macular degeneration and cataracts.          Follow-ups after your visit        Who to contact     If you have questions or need follow up information about today's clinic visit or your schedule please contact [facility] directly at [phone].  Normal or non-critical lab and imaging results will be communicated to you by MyChart, letter or phone  within 4 business days after the clinic has received the results. If you do not hear from us within 7 days, please contact the clinic through Richmedia or phone. If you have a critical or abnormal lab result, we will notify you by phone as soon as possible.  Submit refill requests through Richmedia or call your pharmacy and they will forward the refill request to us. Please allow 3 business days for your refill to be completed.          Additional Information About Your Visit        Richmedia Information     Richmedia gives you secure access to your electronic health record. If you see a primary care provider, you can also send messages to your care team and make appointments. If you have questions, please call your primary care clinic.  If you do not have a primary care provider, please call [phone] and they will assist you.        Care EveryWhere ID     This is your Care EveryWhere ID. This could be used by other organizations to access your [facility] medical records  [id number]        Your Vitals Were     Pulse Temperature Pulse Oximetry BMI (Body Mass Index)          61 97.9  F (36.6  C) (Oral) 97% 24.14 kg/m2         Blood Pressure from Last 3 Encounters:   09/11/17 106/60   06/28/17 113/65   06/27/17 122/58    Weight from Last 3 Encounters:   09/11/17 183 lb (83 kg)   06/28/17 187 lb (84.8 kg)   06/27/17 187 lb 1.6 oz (84.9 kg)              We Performed the Following     Comprehensive metabolic panel     FLU VACCINE, INCREASED ANTIGEN, PRESV FREE, AGE 65+ [17152]     Lipid panel reflex to direct LDL          Today's Medication Changes          These changes are accurate as of: 9/11/17  8:15 AM.  If you have any questions, ask your nurse or doctor.               These medicines have changed or have updated prescriptions.        Dose/Directions    atorvastatin 20 MG tablet   Commonly known as:  LIPITOR   This may have changed:  See the new instructions.   Used for:  Hyperlipidemia LDL goal <100   Changed by:   Shayan Dixon MD        Dose:  20 mg   Take 1 tablet (20 mg) by mouth daily   Quantity:  90 tablet   Refills:  3            Where to get your medicines      These medications were sent to [id number] - [place], [place] - [address]  [address] [place] [address]     Phone:  [phone]     atorvastatin 20 MG tablet                Primary Care Provider Office Phone # Fax #    Shayan Dixon [phone]       [facility] [place] [address]        Equal Access to Services     [facility]: Hadii aad ku hadasho Soomaali, waaxda luqadaha, qaybta kaalmada adeegyada, waxay yanethin haymitchell garza . So [facility] [phone].    ATENCIÓN: Si habla español, tiene a delacruz disposición servicios gratuitos de asistencia lingüística. Ll[facility] [phone].    We comply with applicable federal civil rights laws and [place] laws. We do not discriminate on the basis of race, color, national origin, age, disability sex, sexual orientation or gender identity.            Thank you!     Thank you for choosing [facility]  for your care. Our goal is always to provide you with excellent care. Hearing back from our patients is one way we can continue to improve our services. Please take a few minutes to complete the written survey that you may receive in the mail after your visit with us. Thank you!             Your Updated Medication List - Protect others around you: Learn how to safely use, store and throw away your medicines at www.disposemymeds.org.          This list is accurate as of: 9/11/17  8:15 AM.  Always use your most recent med list.                   Brand Name Dispense Instructions for use Diagnosis    aspirin 81 MG tablet      Take 1 tablet by mouth daily.    Coronary atherosclerosis of native coronary artery       atorvastatin 20 MG tablet    LIPITOR    90 tablet    Take 1 tablet (20 mg) by mouth daily    Hyperlipidemia LDL goal <100       CO Q 10 PO            FISH OIL OMEGA-3 PO           Multi-vitamin Tabs tablet      Take 1 tablet by mouth daily        nystatin cream    MYCOSTATIN    60 g    Apply topically 3 times daily    Tinea cruris       VITAMIN C PO           VITAMIN D-3 PO           VITAMIN E PO

## 2017-09-11 NOTE — PROGRESS NOTES
"SUBJECTIVE:   Cleveland Long is a 77 year old male who presents for Preventive Visit.    Are you in the first 12 months of your Medicare coverage?  No    Physical   Annual:     Getting at least 3 servings of Calcium per day::  Yes    Bi-annual eye exam::  Yes    Dental care twice a year::  Yes    Sleep apnea or symptoms of sleep apnea::  None    Diet::  Regular (no restrictions)    Frequency of exercise::  2-3 days/week    Duration of exercise::  15-30 minutes    Taking medications regularly::  Yes    Medication side effects::  None    Additional concerns today::  YES    Has noted nocturia, new. 1-2 times per night, most nights once. No daytime sx. No leg swelling.    Hyperlipidemia. Last   Lab Results   Component Value Date    LDL 60 08/20/2016     Has an \"irritation\" in the left ear.     Skin lesions - has been evaluated by derm.    Hx of mild CAD, based on coronary calcium score. Had been recommended to start ASA. Noting epigastric discomfort w/ dosing at times, so he stopped taking. Was taking enteric coated w/ food. No known prior cardiac event.     COGNITIVE SCREEN  1) Repeat 3 items (Banana, Sunrise, Chair)    2) Clock draw: ABNORMAL Switched the hands.   3) 3 item recall: Recalls 2 objects   Results: ABNORMAL clock, 1-2 items recalled: PROBABLE COGNITIVE IMPAIRMENT, **INFORM PROVIDER**    Mini-CogTM Copyright S Herb. Licensed by the author for use in [facility]; reprinted with permission (kadi@.[facility]). All rights reserved.          Reviewed and updated as needed this visit by clinical staffTobacco  Allergies  Meds  Med Hx  Surg Hx  Fam Hx  Soc Hx        Reviewed and updated as needed this visit by Provider  Tobacco  Allergies  Meds  Problems  Med Hx  Surg Hx  Fam Hx  Soc Hx         Social History   Substance Use Topics     Smoking status: Former Smoker     Quit date: 2/5/1998     Smokeless tobacco: Never Used     Alcohol use 0.0 oz/week     0 Standard drinks or equivalent per week " "     Comment: 1-2 drinks per week        The patient does not drink >3 drinks per day nor >7 drinks per week.        Today's PHQ-2 Score:   PHQ-2 ( 1999 Pf[employer]) 9/8/2017   Q1: Little interest or pleasure in doing things 0   Q2: Feeling down, depressed or hopeless 0   PHQ-2 Score 0   Q1: Little interest or pleasure in doing things Not at all   Q2: Feeling down, depressed or hopeless Not at all   PHQ-2 Score 0       Do you feel safe in your environment - Yes    Do you have a Health Care Directive?: Yes: Patient states has Advance Directive and will bring in a copy to clinic.    Current providers sharing in care for this patient include:   Patient Care Team:  Shayan Dixon MD as PCP - General (Internal Medicine)      Hearing impairment: No    Ability to successfully perform activities of daily living: Yes, no assistance needed     Fall risk:  Fallen 2 or more times in the past year?: No  Any fall with injury in the past year?: No      Home safety:  none identified    The following health maintenance items are reviewed in Epic and correct as of today:  Health Maintenance   Topic Date Due     ADVANCE DIRECTIVE PLANNING Q5 YRS  04/23/1993     FALL RISK ASSESSMENT  08/16/2017     INFLUENZA VACCINE (SYSTEM ASSIGNED)  09/01/2017     LIPID SCREEN Q5 YR MALE (SYSTEM ASSIGNED)  08/20/2021     TETANUS IMMUNIZATION (SYSTEM ASSIGNED)  03/13/2025     PNEUMOCOCCAL  Completed     Labs reviewed in EPIC    ROS:  Constitutional, HEENT, cardiovascular, pulmonary, GI, , musculoskeletal, neuro, skin, endocrine and psych systems are negative, except as otherwise noted.      OBJECTIVE:   /60 (Cuff Size: Adult Regular)  Pulse 61  Temp 97.9  F (36.6  C) (Oral)  Wt 183 lb (83 kg)  SpO2 97%  BMI 24.14 kg/m2 Estimated body mass index is 24.14 kg/(m^2) as calculated from the following:    Height as of 6/28/17: 6' 1\" (1.854 m).    Weight as of this encounter: 183 lb (83 kg).  EXAM:   GENERAL: healthy, alert and no distress  EYES: " "Eyes grossly normal to inspection, PERRL and conjunctivae and sclerae normal  HENT: ear canals and TM's normal including left ear canal, nose and mouth without ulcers or lesions  NECK: no adenopathy, no asymmetry, masses, or scars and thyroid normal to palpation. No bruits.   RESP: lungs clear to auscultation - no rales, rhonchi or wheezes  CV: regular rate and rhythm, normal S1 S2, no S3 or S4, no murmur, click or rub, no peripheral edema and peripheral pulses strong  ABDOMEN: soft, nontender, no hepatosplenomegaly, no masses and bowel sounds normal  MS: no gross musculoskeletal defects noted, no edema  SKIN: no suspicious lesions or rashes  NEURO: Normal strength and tone, mentation intact and speech normal  PSYCH: mentation appears normal, affect normal/bright    ASSESSMENT / PLAN:       ICD-10-CM    1. Routine general medical examination at a health care facility Z00.00 Comprehensive metabolic panel     Lipid panel reflex to direct LDL   2. Hyperlipidemia LDL goal <100 E78.5 atorvastatin (LIPITOR) 20 MG tablet   3. Atherosclerosis of native coronary artery of native heart without angina pectoris I25.10    4. Need for prophylactic vaccination and inoculation against influenza Z23 FLU VACCINE, INCREASED ANTIGEN, PRESV FREE, AGE 65+ [35926]     Call if would like an ear drop with a steroid component for left ear canal irritation.    End of Life Planning:  Patient currently has an advanced directive: Yes.  Practitioner is supportive of decision.    COUNSELING:  Reviewed preventive health counseling, as reflected in patient instructions        Estimated body mass index is 24.14 kg/(m^2) as calculated from the following:    Height as of 6/28/17: 6' 1\" (1.854 m).    Weight as of this encounter: 183 lb (83 kg).     reports that he quit smoking about 19 years ago. He has never used smokeless tobacco.        Appropriate preventive services were discussed with this patient, including applicable screening as appropriate for " cardiovascular disease, diabetes, osteopenia/osteoporosis, and glaucoma.  As appropriate for age/gender, discussed screening for colorectal cancer, prostate cancer, breast cancer, and cervical cancer. Checklist reviewing preventive services available has been given to the patient.    Reviewed patients plan of care and provided an AVS. The Basic Care Plan (routine screening as documented in Health Maintenance) for Cy meets the Care Plan requirement. This Care Plan has been established and reviewed with the Patient.    Counseling Resources:  ATP IV Guidelines  Pooled Cohorts Equation Calculator  Breast Cancer Risk Calculator  FRAX Risk Assessment  ICSI Preventive Guidelines  Dietary Guidelines for Americans, 2010  USDA's MyPlate  ASA Prophylaxis  Lung CA Screening    Shayan Dixon MD  [facility]

## 2017-09-11 NOTE — NURSING NOTE
"Chief Complaint   Patient presents with     Physical       Initial /60 (Cuff Size: Adult Regular)  Pulse 61  Temp 97.9  F (36.6  C) (Oral)  Wt 183 lb (83 kg)  SpO2 97%  BMI 24.14 kg/m2 Estimated body mass index is 24.14 kg/(m^2) as calculated from the following:    Height as of 6/28/17: 6' 1\" (1.854 m).    Weight as of this encounter: 183 lb (83 kg).  Medication Reconciliation: enmanuel Vincent   "

## 2017-09-11 NOTE — PATIENT INSTRUCTIONS
Preventive Health Recommendations:     Yearly exam:             See your health care provider every year in order to  o   Review health changes.   o   Discuss preventive care.    o   Review your medicines.    Every year, have a diabetes test (fasting glucose).    Every year, have a cholesterol test.   Shots:     Get a flu shot each year.     Get a tetanus shot every 10 years.   Nutrition:     Eat at least 5 servings of fruits and vegetables each day.     Eat whole-grain bread, whole-wheat pasta and brown rice instead of white grains and rice.     Get adequate Calcium and Vitamin D.   Lifestyle    Exercise for at least 150 minutes a week (30 minutes a day, 5 days a week). This will help you control your weight and prevent disease.     Limit alcohol to one drink per day.     No smoking.     Wear sunscreen to prevent skin cancer.     See your dentist every six months for an exam and cleaning.     See your eye doctor every 1 to 2 years to screen for conditions such as glaucoma, macular degeneration and cataracts.

## 2017-09-11 NOTE — NURSING NOTE
Cleveland Long      1.  Has the patient received the information for the influenza vaccine? YES    2.  Does the patient have any of the following contraindications?     Allergy to eggs? No     Allergic reaction to previous influenza vaccines? No     Any other problems to previous influenza vaccines? No     Paralyzed by Guillain-[place] syndrome? No     Currently pregnant? NO     Current moderate or severe illness? No     Allergy to contact lens solution? No    3.  The vaccine has been administered in the usual fashion and the patient was instructed to wait 20 minutes before leaving the building in the event of an allergic reaction: YES    Vaccination given by OLIVER Suarez September 11, 2017 7:47 AM   .  Recorded by Prachi Vincent

## 2017-09-24 DIAGNOSIS — E78.5 HYPERLIPIDEMIA LDL GOAL <100: ICD-10-CM

## 2017-09-24 NOTE — TELEPHONE ENCOUNTER
atorvastatin (LIPITOR) 20 MG tablet     Last Written Prescription Date: 09/11/2017  Last Fill Quantity: 90, # refills: 3  Last Office Visit with G, P or [facility] prescribing provider: 9/11/2017         Lab Results   Component Value Date    CHOL 134 09/11/2017     Lab Results   Component Value Date    HDL 73 09/11/2017     Lab Results   Component Value Date    LDL 47 09/11/2017     Lab Results   Component Value Date    TRIG 71 09/11/2017     Lab Results   Component Value Date    CHOLHDLRATIO 1.7 03/13/2015

## 2017-09-25 RX ORDER — ATORVASTATIN CALCIUM 20 MG/1
TABLET, FILM COATED ORAL
Qty: 31 TABLET | Refills: 0 | OUTPATIENT
Start: 2017-09-25

## 2017-09-25 NOTE — TELEPHONE ENCOUNTER
Chart review confirms medication was sent 9/11/17 to requesting pharmacy, one year supply. Sent back as denied/duplicate.    Cori Altman, MSN, RN-BC  Care Coordinator

## 2017-10-31 NOTE — PROGRESS NOTES
"  SUBJECTIVE:                                                    Cleveland Long is a 77 year old male who presents to clinic today for the following health issues:    Joint Pain    Onset: 3 months ago    Description:   Location: right knee  Character: Sharp- with movement,  Constant dull ache    Intensity: moderate, 8-9/10    Progression of Symptoms: worse    Accompanying Signs & Symptoms:  Other symptoms: local and difficulty sleeping     History:   Previous similar pain: no       Precipitating factors:   Trauma or overuse: YES- PT yesterday  Gardens and kneels frequently    Alleviating factors:  Improved by: heat- helps  ice and topical pain medication- not effective  Therapies Tried and outcome: Ice, heat, PT    Other concern: bite on left hand, no pain/tenderness, swelling, redness, discharge.    ROS:  ROS otherwise negative    OBJECTIVE:                                                    /58 (BP Location: Right arm, Patient Position: Chair, Cuff Size: Adult Regular)  Pulse 69  Temp 98  F (36.7  C) (Oral)  Ht 6' 1\" (1.854 m)  Wt 187 lb 1.6 oz (84.9 kg)  SpO2 96%  BMI 24.68 kg/m2  Body mass index is 24.68 kg/(m^2).   GENERAL: alert, no distress  RESP: lungs clear to auscultation - no rales, no rhonchi, no wheezes  CV: regular rates and rhythm, normal S1 S2, no S3 or S4 and no murmur, no click or rub  SKIN: inspection of the left hand--base of thumb reveals purple marking. No blanching, tenderness. No swelling. No FB.    Diagnostic Test Results:  No results found for this or any previous visit (from the past 24 hour(s)).     ASSESSMENT/PLAN:                                                    (M25.561) Acute pain of right knee  (primary encounter diagnosis)  Comment: patient is being managed by Dr. Mason. Worsening pain after physical therapy--appointment scheduled with ortho.   Plan:     (R58) Ecchymosis  Comment: local reaction to insect bite.   Plan:    Frances Mcdaniel PA-C  [facility] " OSMAR     no

## 2018-04-02 PROBLEM — M25.561 KNEE PAIN, RIGHT: Status: RESOLVED | Noted: 2017-06-19 | Resolved: 2018-04-02

## 2018-04-02 NOTE — PROGRESS NOTES
"Discharge Note    Progress reporting period is from initial eval to Jul 3, 2017.     Cy failed to return for next follow up visit and current status is unknown.  Please see information below for last relevant information on current status.  Patient seen for 3 visits.  SUBJECTIVE  Subjective changes noted by patient:  Had injection on 6/28/17.  Feeling considerably better but still aching.   .  Current pain level is 1/10.     Previous pain level was   .   Changes in function:  Yes (See Goal flowsheet attached for changes in current functional level)  Adverse reaction to treatment or activity: None    OBJECTIVE  Changes noted in objective findings: Fair control with 6\" step up on right.      ASSESSMENT/PLAN  Diagnosis: Right knee   DIAGP:  The encounter diagnosis was Acute pain of right knee.  Updated problem list and treatment plan:   Decreased strength - HEP  STG/LTGs have been met or progress has been made towards goals:  Yes, please see goal flowsheet for most current information  Assessment of Progress: current status is unknown.    Last current status: Pt is progressing as expected   Self Management Plans:  HEP  I have re-evaluated this patient and find that the nature, scope, duration and intensity of the therapy is appropriate for the medical condition of the patient.  Cy continues to require the following intervention to meet STG and LTG's:  HEP.    Recommendations:  Discharge with current home program.  Patient to follow up with MD as needed.    Please refer to the daily flowsheet for treatment today, total treatment time and time spent performing 1:1 timed codes.    "

## 2018-07-13 ENCOUNTER — OFFICE VISIT (OUTPATIENT)
Dept: PEDIATRICS | Facility: CLINIC | Age: 78
End: 2018-07-13
Payer: COMMERCIAL

## 2018-07-13 VITALS
SYSTOLIC BLOOD PRESSURE: 116 MMHG | HEIGHT: 72 IN | OXYGEN SATURATION: 98 % | TEMPERATURE: 98.4 F | BODY MASS INDEX: 24.64 KG/M2 | WEIGHT: 181.9 LBS | HEART RATE: 65 BPM | DIASTOLIC BLOOD PRESSURE: 62 MMHG

## 2018-07-13 DIAGNOSIS — R30.0 DYSURIA: Primary | ICD-10-CM

## 2018-07-13 LAB
ALBUMIN UR-MCNC: NEGATIVE MG/DL
APPEARANCE UR: CLEAR
BILIRUB UR QL STRIP: NEGATIVE
COLOR UR AUTO: YELLOW
GLUCOSE UR STRIP-MCNC: NEGATIVE MG/DL
HGB UR QL STRIP: NEGATIVE
KETONES UR STRIP-MCNC: NEGATIVE MG/DL
LEUKOCYTE ESTERASE UR QL STRIP: NEGATIVE
NITRATE UR QL: NEGATIVE
PH UR STRIP: 7 PH (ref 5–7)
SOURCE: NORMAL
SP GR UR STRIP: 1.01 (ref 1–1.03)
UROBILINOGEN UR STRIP-ACNC: 0.2 EU/DL (ref 0.2–1)

## 2018-07-13 PROCEDURE — 81003 URINALYSIS AUTO W/O SCOPE: CPT | Performed by: PHYSICIAN ASSISTANT

## 2018-07-13 PROCEDURE — 99213 OFFICE O/P EST LOW 20 MIN: CPT | Performed by: PHYSICIAN ASSISTANT

## 2018-07-13 PROCEDURE — 87086 URINE CULTURE/COLONY COUNT: CPT | Performed by: PHYSICIAN ASSISTANT

## 2018-07-13 RX ORDER — SULFAMETHOXAZOLE/TRIMETHOPRIM 800-160 MG
1 TABLET ORAL 2 TIMES DAILY
Qty: 20 TABLET | Refills: 0 | Status: SHIPPED | OUTPATIENT
Start: 2018-07-13 | End: 2018-09-12

## 2018-07-13 NOTE — PROGRESS NOTES
SUBJECTIVE:   Cleveland Long is a 78 year old male who presents to clinic today for the following health issues:    Genitourinary - Male  Onset: 4 days ago    Description:   Dysuria (painful urination): YES  Hematuria (blood in urine): no   Frequency: YES  Are you urinating at night : YES  Hesitancy (delay in urine): no   Retention (unable to empty): YES  Decrease in urinary flow: YES slightly   Incontinence: no     Progression of Symptoms:  same    Accompanying Signs & Symptoms:  Fever: no   Back/Flank pain: no   Urethral discharge: no   Testicle lumps/masses/pain: no   Nausea and/or vomiting: no   Abdominal pain: no     History:   History of frequent UTI's: no   History of kidney stones: no   History of hernias: YES- long time ago  Personal or Family history of Prostate problems: has been told that prostate is enlarged  Sexually active: no     Precipitating factors:   none     Alleviating factors:  none    ROS:  ROS otherwise negative    OBJECTIVE:                                                    /62 (BP Location: Right arm, Cuff Size: Adult Regular)  Pulse 65  Temp 98.4  F (36.9  C) (Oral)  Ht 6' (1.829 m)  Wt 181 lb 14.4 oz (82.5 kg)  SpO2 98%  BMI 24.67 kg/m2  Body mass index is 24.67 kg/(m^2).   GENERAL: alert, no distress  HENT: Mouth- no ulcers, no lesions  NECK: no tenderness, no adenopathy  RESP: lungs clear to auscultation - no rales, no rhonchi, no wheezes  CV: regular rates and rhythm, normal S1 S2, no S3 or S4 and no murmur, no click or rub  ABDOMEN: soft, no tenderness    Diagnostic test results:  Results for orders placed or performed in visit on 07/13/18 (from the past 24 hour(s))   UA reflex to Microscopic and Culture   Result Value Ref Range    Color Urine Yellow     Appearance Urine Clear     Glucose Urine Negative NEG^Negative mg/dL    Bilirubin Urine Negative NEG^Negative    Ketones Urine Negative NEG^Negative mg/dL    Specific Gravity Urine 1.015 1.003 - 1.035    Blood Urine  Negative NEG^Negative    pH Urine 7.0 5.0 - 7.0 pH    Protein Albumin Urine Negative NEG^Negative mg/dL    Urobilinogen Urine 0.2 0.2 - 1.0 EU/dL    Nitrite Urine Negative NEG^Negative    Leukocyte Esterase Urine Negative NEG^Negative    Source Midstream Urine         ASSESSMENT/PLAN:                                                    (R30.0) Dysuria  (primary encounter diagnosis)  Comment: given duration and consistency of symptoms, proceed with antibiotics to cover for acute prostatitis. Patient in agreement with plan.   UC pending.   Plan: UA reflex to Microscopic and Culture, Urine         Culture Aerobic Bacterial,         sulfamethoxazole-trimethoprim (BACTRIM         DS/SEPTRA DS) 800-160 MG per tablet          Frances Mcdaniel PA-C  [facility]

## 2018-07-14 LAB
BACTERIA SPEC CULT: NO GROWTH
SPECIMEN SOURCE: NORMAL

## 2018-09-11 ASSESSMENT — ENCOUNTER SYMPTOMS
ARTHRALGIAS: 1
NERVOUS/ANXIOUS: 0
CHILLS: 0
SHORTNESS OF BREATH: 0
MYALGIAS: 1
PARESTHESIAS: 0
COUGH: 0
HEMATOCHEZIA: 0
ABDOMINAL PAIN: 0
HEADACHES: 0
JOINT SWELLING: 1
EYE PAIN: 0
DIARRHEA: 0
NAUSEA: 0
FREQUENCY: 0
DIZZINESS: 0
PALPITATIONS: 0
SORE THROAT: 0
HEARTBURN: 0
DYSURIA: 0
HEMATURIA: 0
WEAKNESS: 0
FEVER: 0
CONSTIPATION: 0

## 2018-09-11 ASSESSMENT — ACTIVITIES OF DAILY LIVING (ADL)
CURRENT_FUNCTION: NO ASSISTANCE NEEDED
I_NEED_ASSISTANCE_FOR_THE_FOLLOWING_DAILY_ACTIVITIES:: NO ASSISTANCE IS NEEDED

## 2018-09-12 ENCOUNTER — OFFICE VISIT (OUTPATIENT)
Dept: PEDIATRICS | Facility: CLINIC | Age: 78
End: 2018-09-12
Payer: COMMERCIAL

## 2018-09-12 VITALS
OXYGEN SATURATION: 98 % | SYSTOLIC BLOOD PRESSURE: 116 MMHG | TEMPERATURE: 97 F | HEART RATE: 60 BPM | BODY MASS INDEX: 24.5 KG/M2 | WEIGHT: 180.9 LBS | DIASTOLIC BLOOD PRESSURE: 66 MMHG | HEIGHT: 72 IN

## 2018-09-12 DIAGNOSIS — Z00.00 ROUTINE GENERAL MEDICAL EXAMINATION AT A HEALTH CARE FACILITY: Primary | ICD-10-CM

## 2018-09-12 DIAGNOSIS — E78.5 HYPERLIPIDEMIA LDL GOAL <100: ICD-10-CM

## 2018-09-12 DIAGNOSIS — Z12.5 SCREENING FOR PROSTATE CANCER: ICD-10-CM

## 2018-09-12 LAB
ALBUMIN SERPL-MCNC: 3.6 G/DL (ref 3.4–5)
ALP SERPL-CCNC: 39 U/L (ref 40–150)
ALT SERPL W P-5'-P-CCNC: 31 U/L (ref 0–70)
ANION GAP SERPL CALCULATED.3IONS-SCNC: 7 MMOL/L (ref 3–14)
AST SERPL W P-5'-P-CCNC: 25 U/L (ref 0–45)
BILIRUB SERPL-MCNC: 1.9 MG/DL (ref 0.2–1.3)
BUN SERPL-MCNC: 19 MG/DL (ref 7–30)
CALCIUM SERPL-MCNC: 9.1 MG/DL (ref 8.5–10.1)
CHLORIDE SERPL-SCNC: 110 MMOL/L (ref 94–109)
CHOLEST SERPL-MCNC: 150 MG/DL
CO2 SERPL-SCNC: 26 MMOL/L (ref 20–32)
CREAT SERPL-MCNC: 0.92 MG/DL (ref 0.66–1.25)
GFR SERPL CREATININE-BSD FRML MDRD: 79 ML/MIN/1.7M2
GLUCOSE SERPL-MCNC: 97 MG/DL (ref 70–99)
HDLC SERPL-MCNC: 68 MG/DL
LDLC SERPL CALC-MCNC: 70 MG/DL
NONHDLC SERPL-MCNC: 82 MG/DL
POTASSIUM SERPL-SCNC: 4.6 MMOL/L (ref 3.4–5.3)
PROT SERPL-MCNC: 7.1 G/DL (ref 6.8–8.8)
PSA SERPL-ACNC: 1.29 UG/L (ref 0–4)
SODIUM SERPL-SCNC: 143 MMOL/L (ref 133–144)
TRIGL SERPL-MCNC: 60 MG/DL

## 2018-09-12 PROCEDURE — 99397 PER PM REEVAL EST PAT 65+ YR: CPT | Performed by: INTERNAL MEDICINE

## 2018-09-12 PROCEDURE — 80061 LIPID PANEL: CPT | Performed by: INTERNAL MEDICINE

## 2018-09-12 PROCEDURE — G0103 PSA SCREENING: HCPCS | Performed by: INTERNAL MEDICINE

## 2018-09-12 PROCEDURE — 36415 COLL VENOUS BLD VENIPUNCTURE: CPT | Performed by: INTERNAL MEDICINE

## 2018-09-12 PROCEDURE — 80053 COMPREHEN METABOLIC PANEL: CPT | Performed by: INTERNAL MEDICINE

## 2018-09-12 RX ORDER — ATORVASTATIN CALCIUM 20 MG/1
20 TABLET, FILM COATED ORAL DAILY
Qty: 90 TABLET | Refills: 3 | Status: SHIPPED | OUTPATIENT
Start: 2018-09-12 | End: 2019-09-09

## 2018-09-12 ASSESSMENT — ENCOUNTER SYMPTOMS
DIZZINESS: 0
SHORTNESS OF BREATH: 0
NERVOUS/ANXIOUS: 0
SORE THROAT: 0
NAUSEA: 0
EYE PAIN: 0
HEMATURIA: 0
FREQUENCY: 0
COUGH: 0
JOINT SWELLING: 1
ARTHRALGIAS: 1
FEVER: 0
HEADACHES: 0
HEMATOCHEZIA: 0
DYSURIA: 0
HEARTBURN: 0
PALPITATIONS: 0
PARESTHESIAS: 0
WEAKNESS: 0
MYALGIAS: 1
CHILLS: 0
ABDOMINAL PAIN: 0
DIARRHEA: 0
CONSTIPATION: 0

## 2018-09-12 ASSESSMENT — ACTIVITIES OF DAILY LIVING (ADL): CURRENT_FUNCTION: NO ASSISTANCE NEEDED

## 2018-09-12 NOTE — PATIENT INSTRUCTIONS
Preventive Health Recommendations:     Yearly exam:             See your health care provider every year in order to  o   Review health changes.   o   Discuss preventive care.    o   Review your medicines.    Every 1-2 years, have a diabetes test (fasting glucose).     Every year, have a cholesterol test.    Shots:     Get a flu shot each year.     Get a tetanus shot every 10 years.     Talk to your pharmacist about a shingles vaccine.     Nutrition:     Eat at least 5 servings of fruits and vegetables each day.     Eat whole-grain bread, whole-wheat pasta and brown rice instead of white grains and rice.     Get adequate Calcium and Vitamin D.   Lifestyle    Exercise for at least 150 minutes a week (30 minutes a day, 5 days a week). This will help you control your weight and prevent disease.     Limit alcohol to one drink per day.     No smoking.     Wear sunscreen to prevent skin cancer.     See your dentist every six months for an exam and cleaning.     See your eye doctor every 1 to 2 years to screen for conditions such as glaucoma, macular degeneration and cataracts.

## 2018-09-12 NOTE — PROGRESS NOTES
SUBJECTIVE:   Cleveland Long is a 78 year old male who presents for Preventive Visit.    Are you in the first 12 months of your Medicare coverage?  No    Physical   Annual:     Getting at least 3 servings of Calcium per day:  Yes    Bi-annual eye exam:  Yes    Dental care twice a year:  Yes    Sleep apnea or symptoms of sleep apnea:  None    Diet:  Regular (no restrictions)    Frequency of exercise:  1 day/week    Duration of exercise:  Less than 15 minutes    Taking medications regularly:  Yes    Medication side effects:  None    Additional concerns today:  No    Ability to successfully perform activities of daily living: no assistance needed    Home Safety:  No safety concerns identified    Hearing Impairment: difficulty following a conversation in a noisy restaurant or crowded room    Hyperlipidemia. Tolerating atorvastatin. Last   Lab Results   Component Value Date    LDL 47 09/11/2017     Having some minor prostate issues. Up once per night to void.      Intermittent left ear pain. Sharp.     Allergy sx. Itchy eyes and ears.    Occasional leg cramping.     Fall risk:  Fallen 2 or more times in the past year?: No  Any fall with injury in the past year?: No    COGNITIVE SCREEN  1) Repeat 3 items (Leader, Season, Table)    2) Clock draw: NORMAL  3) 3 item recall: Recalls 2 objects   Results: NORMAL clock, 1-2 items recalled: COGNITIVE IMPAIRMENT LESS LIKELY    Mini-CogTM Copyright S Herb. Licensed by the author for use in [facility]; reprinted with permission ([email or web address]). All rights reserved.        Reviewed and updated as needed this visit by Provider  Tobacco  Allergies  Meds  Problems  Med Hx  Surg Hx  Fam Hx  Soc Hx         Social History   Substance Use Topics     Smoking status: Former Smoker     Quit date: 2/5/1998     Smokeless tobacco: Never Used     Alcohol use 0.0 oz/week     0 Standard drinks or equivalent per week      Comment: 1-2 drinks per week        Alcohol Use 9/11/2018    If you drink alcohol do you typically have greater than 3 drinks per day OR greater than 7 drinks per week? No   No flowsheet data found.        Hyperlipidemia Follow-Up      Rate your low fat/cholesterol diet?: not monitoring fat    Taking statin?  Yes, possible muscle aches from statin    Other lipid medications/supplements?:  Fish oil/Omega 3, without side effects      Today's PHQ-2 Score:   PHQ-2 ( 1999 Pfizer) 9/11/2018   Q1: Little interest or pleasure in doing things 0   Q2: Feeling down, depressed or hopeless 0   PHQ-2 Score 0   Q1: Little interest or pleasure in doing things Not at all   Q2: Feeling down, depressed or hopeless Not at all   PHQ-2 Score 0       Do you feel safe in your environment - Yes    Do you have a Health Care Directive?: Yes: Advance Directive has been received and scanned.    Current providers sharing in care for this patient include:   Patient Care Team:  Shayan Dixon MD as PCP - General (Internal Medicine)    The following health maintenance items are reviewed in Epic and correct as of today:  Health Maintenance   Topic Date Due     ADVANCE DIRECTIVE PLANNING Q5 YRS  04/23/1993     INFLUENZA VACCINE (1) 09/01/2018     FALL RISK ASSESSMENT  09/11/2018     PHQ-2 Q1 YR  09/11/2018     TETANUS IMMUNIZATION (SYSTEM ASSIGNED)  03/13/2025     PNEUMOCOCCAL  Completed     Labs reviewed in EPIC      Review of Systems   Constitutional: Negative for chills and fever.   HENT: Positive for ear pain. Negative for congestion, hearing loss and sore throat.    Eyes: Negative for pain and visual disturbance.   Respiratory: Negative for cough and shortness of breath.    Cardiovascular: Negative for chest pain, palpitations and peripheral edema.   Gastrointestinal: Negative for abdominal pain, constipation, diarrhea, heartburn, hematochezia and nausea.   Genitourinary: Negative for discharge, dysuria, frequency, genital sores, hematuria, impotence and urgency.   Musculoskeletal: Positive for  arthralgias, joint swelling and myalgias.   Skin: Negative for rash.   Neurological: Negative for dizziness, weakness, headaches and paresthesias.   Psychiatric/Behavioral: Negative for mood changes. The patient is not nervous/anxious.        OBJECTIVE:   /66 (BP Location: Right arm, Patient Position: Sitting, Cuff Size: Adult Large)  Pulse 60  Temp 97  F (36.1  C) (Tympanic)  Ht 6' (1.829 m)  Wt 180 lb 14.4 oz (82.1 kg)  SpO2 98%  BMI 24.53 kg/m2 Estimated body mass index is 24.53 kg/(m^2) as calculated from the following:    Height as of this encounter: 6' (1.829 m).    Weight as of this encounter: 180 lb 14.4 oz (82.1 kg).  Physical Exam  GENERAL: healthy, alert and no distress  EYES: Eyes grossly normal to inspection, PERRL and conjunctivae and sclerae normal  HENT: ear canals and TM's normal, nose and mouth without ulcers or lesions  NECK: no adenopathy, no asymmetry, masses, or scars and thyroid normal to palpation. No bruits.   RESP: lungs clear to auscultation - no rales, rhonchi or wheezes  CV: regular rate and rhythm, normal S1 S2, no S3 or S4, no murmur, click or rub, no peripheral edema and peripheral pulses strong  ABDOMEN: soft, nontender, no hepatosplenomegaly, no masses and bowel sounds normal  MS: no gross musculoskeletal defects noted, no edema  SKIN: no suspicious lesions or rashes  NEURO: Normal strength and tone, mentation intact and speech normal  PSYCH: mentation appears normal, affect normal/bright      ASSESSMENT / PLAN:       ICD-10-CM    1. Routine general medical examination at a health care facility Z00.00 Lipid Profile (Chol, Trig, HDL, LDL calc)     Comprehensive metabolic panel   2. Hyperlipidemia LDL goal <100 E78.5 atorvastatin (LIPITOR) 20 MG tablet     Lipid Profile (Chol, Trig, HDL, LDL calc)     Comprehensive metabolic panel   3. Screening for prostate cancer Z12.5 PSA, screen       End of Life Planning:  Patient currently has an advanced directive: Yes.   Practitioner is supportive of decision.    COUNSELING:  Reviewed preventive health counseling, as reflected in patient instructions    BP Readings from Last 1 Encounters:   09/12/18 116/66     Estimated body mass index is 24.53 kg/(m^2) as calculated from the following:    Height as of this encounter: 6' (1.829 m).    Weight as of this encounter: 180 lb 14.4 oz (82.1 kg).     reports that he quit smoking about 20 years ago. He has never used smokeless tobacco.      Appropriate preventive services were discussed with this patient, including applicable screening as appropriate for cardiovascular disease, diabetes, osteopenia/osteoporosis, and glaucoma.  As appropriate for age/gender, discussed screening for colorectal cancer, prostate cancer. Checklist reviewing preventive services available has been given to the patient.    Reviewed patients plan of care and provided an AVS. The Basic Care Plan (routine screening as documented in Health Maintenance) for Cy meets the Care Plan requirement. This Care Plan has been established and reviewed with the Patient.    Counseling Resources:  ATP IV Guidelines  Pooled Cohorts Equation Calculator  Breast Cancer Risk Calculator  FRAX Risk Assessment  ICSI Preventive Guidelines  Dietary Guidelines for Americans, 2010  [employer]'s MyPlate  ASA Prophylaxis  Lung CA Screening    Shayan Dixon MD  [facility]

## 2018-09-12 NOTE — MR AVS SNAPSHOT
After Visit Summary   9/12/2018    Cleveland Long    MRN: [id number]           Patient Information     Date Of Birth          1940        Visit Information        Provider Department      9/12/2018 7:00 AM Shayan Dixon MD [facility]        Today's Diagnoses     Routine general medical examination at a health care facility    -  1    Hyperlipidemia LDL goal <100        Screening for prostate cancer          Care Instructions      Preventive Health Recommendations:     Yearly exam:             See your health care provider every year in order to  o   Review health changes.   o   Discuss preventive care.    o   Review your medicines.    Every 1-2 years, have a diabetes test (fasting glucose).     Every year, have a cholesterol test.    Shots:     Get a flu shot each year.     Get a tetanus shot every 10 years.     Talk to your pharmacist about a shingles vaccine.     Nutrition:     Eat at least 5 servings of fruits and vegetables each day.     Eat whole-grain bread, whole-wheat pasta and brown rice instead of white grains and rice.     Get adequate Calcium and Vitamin D.   Lifestyle    Exercise for at least 150 minutes a week (30 minutes a day, 5 days a week). This will help you control your weight and prevent disease.     Limit alcohol to one drink per day.     No smoking.     Wear sunscreen to prevent skin cancer.     See your dentist every six months for an exam and cleaning.     See your eye doctor every 1 to 2 years to screen for conditions such as glaucoma, macular degeneration and cataracts.          Follow-ups after your visit        Who to contact     If you have questions or need follow up information about today's clinic visit or your schedule please contact [facility] directly at [phone].  Normal or non-critical lab and imaging results will be communicated to you by MyChart, letter or phone within 4 business days after the clinic has received the results. If  you do not hear from us within 7 days, please contact the clinic through "[employer]" or phone. If you have a critical or abnormal lab result, we will notify you by phone as soon as possible.  Submit refill requests through "[employer]" or call your pharmacy and they will forward the refill request to us. Please allow 3 business days for your refill to be completed.          Additional Information About Your Visit        [email or web address]     "[employer]" gives you secure access to your electronic health record. If you see a primary care provider, you can also send messages to your care team and make appointments. If you have questions, please call your primary care clinic.  If you do not have a primary care provider, please call [phone] and they will assist you.        Care EveryWhere ID     This is your Care EveryWhere ID. This could be used by other organizations to access your [place] medical records  [id number]        Your Vitals Were     Pulse Temperature Height Pulse Oximetry BMI (Body Mass Index)       60 97  F (36.1  C) (Tympanic) 6' (1.829 m) 98% 24.53 kg/m2        Blood Pressure from Last 3 Encounters:   09/12/18 116/66   07/13/18 116/62   09/11/17 106/60    Weight from Last 3 Encounters:   09/12/18 180 lb 14.4 oz (82.1 kg)   07/13/18 181 lb 14.4 oz (82.5 kg)   09/11/17 183 lb (83 kg)              We Performed the Following     Comprehensive metabolic panel     Lipid Profile (Chol, Trig, HDL, LDL calc)     PSA, screen          Where to get your medicines      These medications were sent to [id number] - [place], [place] - [address]  [address] [address]     Phone:  [phone]     atorvastatin 20 MG tablet          Primary Care Provider Office Phone # Fax #    Shayan Dixon [phone]       [facility] [place] [address]        Equal Access to Services     MANOJ MARTIN AH: ro Roque, radha sylvester  rigo friasshana garciaaan ah. So [place] -[phone].    ATENCIÓN: Si habla fernando, tiene a delacruz disposición servicios gratuitos de asistencia lingüística. Brad al [phone].    We comply with applicable federal civil rights laws and [place] laws. We do not discriminate on the basis of race, color, national origin, age, disability, sex, sexual orientation, or gender identity.            Thank you!     Thank you for choosing [facility]  for your care. Our goal is always to provide you with excellent care. Hearing back from our patients is one way we can continue to improve our services. Please take a few minutes to complete the written survey that you may receive in the mail after your visit with us. Thank you!             Your Updated Medication List - Protect others around you: Learn how to safely use, store and throw away your medicines at www.disposemymeds.org.          This list is accurate as of 9/12/18  7:30 AM.  Always use your most recent med list.                   Brand Name Dispense Instructions for use Diagnosis    aspirin 81 MG tablet      Take 1 tablet by mouth daily.    Coronary atherosclerosis of native coronary artery       atorvastatin 20 MG tablet    LIPITOR    90 tablet    Take 1 tablet (20 mg) by mouth daily    Hyperlipidemia LDL goal <100       CO Q 10 PO           FISH OIL OMEGA-3 PO           Multi-vitamin Tabs tablet      Take 1 tablet by mouth daily        VITAMIN C PO           VITAMIN D-3 PO           VITAMIN E PO

## 2019-09-09 DIAGNOSIS — E78.5 HYPERLIPIDEMIA LDL GOAL <100: ICD-10-CM

## 2019-09-09 RX ORDER — ATORVASTATIN CALCIUM 20 MG/1
20 TABLET, FILM COATED ORAL DAILY
Qty: 90 TABLET | Refills: 0 | Status: SHIPPED | OUTPATIENT
Start: 2019-09-09 | End: 2019-11-11

## 2019-09-09 NOTE — TELEPHONE ENCOUNTER
"Requested Prescriptions   Pending Prescriptions Disp Refills     atorvastatin (LIPITOR) 20 MG tablet [Pharmacy Med Name: Atorvastatin Calcium Oral Tablet 20 MG]    Last Written Prescription Date:  9/12/2018  Last Fill Quantity: 90,  # refills: 3   Last office visit: 9/12/2018 with prescribing provider:  Shayan Dixon     Future Office Visit:     90 tablet 2     Sig: Take 1 tablet (20 mg) by mouth daily       Statins Protocol Passed - 9/9/2019  7:01 AM        Passed - LDL on file in past 12 months     Recent Labs   Lab Test 09/12/18  0734   LDL 70             Passed - No abnormal creatine kinase in past 12 months     No lab results found.             Passed - Recent (12 mo) or future (30 days) visit within the authorizing provider's specialty     Patient had office visit in the last 12 months or has a visit in the next 30 days with authorizing provider or within the authorizing provider's specialty.  See \"Patient Info\" tab in inbasket, or \"Choose Columns\" in Meds & Orders section of the refill encounter.              Passed - Medication is active on med list        Passed - Patient is age 18 or older          "

## 2019-10-01 ENCOUNTER — HEALTH MAINTENANCE LETTER (OUTPATIENT)
Age: 79
End: 2019-10-01

## 2019-11-08 ASSESSMENT — ENCOUNTER SYMPTOMS
JOINT SWELLING: 1
ARTHRALGIAS: 1

## 2019-11-08 ASSESSMENT — ACTIVITIES OF DAILY LIVING (ADL): CURRENT_FUNCTION: NO ASSISTANCE NEEDED

## 2019-11-11 ENCOUNTER — OFFICE VISIT (OUTPATIENT)
Dept: PEDIATRICS | Facility: CLINIC | Age: 79
End: 2019-11-11
Payer: MEDICARE

## 2019-11-11 VITALS
RESPIRATION RATE: 16 BRPM | SYSTOLIC BLOOD PRESSURE: 122 MMHG | HEIGHT: 70 IN | HEART RATE: 60 BPM | DIASTOLIC BLOOD PRESSURE: 74 MMHG | WEIGHT: 176.9 LBS | OXYGEN SATURATION: 97 % | BODY MASS INDEX: 25.33 KG/M2 | TEMPERATURE: 97.2 F

## 2019-11-11 DIAGNOSIS — E78.5 HYPERLIPIDEMIA LDL GOAL <100: ICD-10-CM

## 2019-11-11 DIAGNOSIS — Z12.5 SCREENING FOR PROSTATE CANCER: ICD-10-CM

## 2019-11-11 DIAGNOSIS — Z00.00 ENCOUNTER FOR MEDICARE ANNUAL WELLNESS EXAM: Primary | ICD-10-CM

## 2019-11-11 LAB
ALBUMIN SERPL-MCNC: 3.5 G/DL (ref 3.4–5)
ALP SERPL-CCNC: 45 U/L (ref 40–150)
ALT SERPL W P-5'-P-CCNC: 23 U/L (ref 0–70)
ANION GAP SERPL CALCULATED.3IONS-SCNC: 6 MMOL/L (ref 3–14)
AST SERPL W P-5'-P-CCNC: 19 U/L (ref 0–45)
BILIRUB SERPL-MCNC: 1.9 MG/DL (ref 0.2–1.3)
BUN SERPL-MCNC: 21 MG/DL (ref 7–30)
CALCIUM SERPL-MCNC: 9.2 MG/DL (ref 8.5–10.1)
CHLORIDE SERPL-SCNC: 109 MMOL/L (ref 94–109)
CHOLEST SERPL-MCNC: 167 MG/DL
CO2 SERPL-SCNC: 25 MMOL/L (ref 20–32)
CREAT SERPL-MCNC: 0.93 MG/DL (ref 0.66–1.25)
GFR SERPL CREATININE-BSD FRML MDRD: 77 ML/MIN/{1.73_M2}
GLUCOSE SERPL-MCNC: 97 MG/DL (ref 70–99)
HDLC SERPL-MCNC: 72 MG/DL
LDLC SERPL CALC-MCNC: 82 MG/DL
NONHDLC SERPL-MCNC: 95 MG/DL
POTASSIUM SERPL-SCNC: 4.7 MMOL/L (ref 3.4–5.3)
PROT SERPL-MCNC: 6.7 G/DL (ref 6.8–8.8)
PSA SERPL-ACNC: 1.39 UG/L (ref 0–4)
SODIUM SERPL-SCNC: 140 MMOL/L (ref 133–144)
TRIGL SERPL-MCNC: 63 MG/DL

## 2019-11-11 PROCEDURE — G0103 PSA SCREENING: HCPCS | Performed by: INTERNAL MEDICINE

## 2019-11-11 PROCEDURE — 99397 PER PM REEVAL EST PAT 65+ YR: CPT | Performed by: INTERNAL MEDICINE

## 2019-11-11 PROCEDURE — 80053 COMPREHEN METABOLIC PANEL: CPT | Performed by: INTERNAL MEDICINE

## 2019-11-11 PROCEDURE — 36415 COLL VENOUS BLD VENIPUNCTURE: CPT | Performed by: INTERNAL MEDICINE

## 2019-11-11 PROCEDURE — 80061 LIPID PANEL: CPT | Performed by: INTERNAL MEDICINE

## 2019-11-11 RX ORDER — ATORVASTATIN CALCIUM 20 MG/1
20 TABLET, FILM COATED ORAL DAILY
Qty: 90 TABLET | Refills: 3 | Status: SHIPPED | OUTPATIENT
Start: 2019-11-11 | End: 2020-11-02

## 2019-11-11 ASSESSMENT — ENCOUNTER SYMPTOMS
ARTHRALGIAS: 1
JOINT SWELLING: 1

## 2019-11-11 ASSESSMENT — ACTIVITIES OF DAILY LIVING (ADL): CURRENT_FUNCTION: NO ASSISTANCE NEEDED

## 2019-11-11 ASSESSMENT — MIFFLIN-ST. JEOR: SCORE: 1513.66

## 2019-11-11 NOTE — PATIENT INSTRUCTIONS
Patient Education   Personalized Prevention Plan  You are due for the preventive services outlined below.  Your care team is available to assist you in scheduling these services.  If you have already completed any of these items, please share that information with your care team to update in your medical record.  Health Maintenance Due   Topic Date Due     Discuss Advance Care Planning  1940     Zoster (Shingles) Vaccine (2 of 3) 04/02/2011

## 2019-11-11 NOTE — PROGRESS NOTES
"SUBJECTIVE:   Cleveland Long is a 79 year old male who presents for Preventive Visit.  Are you in the first 12 months of your Medicare coverage?  No    Healthy Habits:     In general, how would you rate your overall health?  Good    Frequency of exercise:  2-3 days/week    Duration of exercise:  15-30 minutes    Do you usually eat at least 4 servings of fruit and vegetables a day, include whole grains    & fiber and avoid regularly eating high fat or \"junk\" foods?  Yes    Taking medications regularly:  Yes    Medication side effects:  Muscle aches    Ability to successfully perform activities of daily living:  No assistance needed    Home Safety:  No safety concerns identified    Hearing Impairment:  Difficulty following a conversation in a noisy restaurant or crowded room    In the past 6 months, have you been bothered by leaking of urine?  No    In general, how would you rate your overall mental or emotional health?  Good      PHQ-2 Total Score: 0    Additional concerns today:  Yes    Do you feel safe in your environment? Yes    Have you ever done Advance Care Planning? (For example, a Health Directive, POLST, or a discussion with a medical provider or your loved ones about your wishes): Yes, patient states has an Advance Care Planning document and will bring a copy to the clinic.      Fall risk  Fallen 2 or more times in the past year?: No  Any fall with injury in the past year?: No    Cognitive Screening   1) Repeat 3 items (Leader, Season, Table)    2) Clock draw: NORMAL  3) 3 item recall: Recalls 3 objects  Results: 3 items recalled: COGNITIVE IMPAIRMENT LESS LIKELY    Mini-CogTM Copyright PAMELLA Estevez. Licensed by the author for use in [facility]; reprinted with permission ([email or web address]). All rights reserved.      Do you have sleep apnea, excessive snoring or daytime drowsiness?: yes    Reviewed and updated as needed this visit by Provider  Tobacco  Allergies  Meds  Problems  Med Hx  Surg Hx  Fam " Hx        Social History     Tobacco Use     Smoking status: Former Smoker     Last attempt to quit: 1998     Years since quittin.7     Smokeless tobacco: Never Used   Substance Use Topics     Alcohol use: Yes     Alcohol/week: 0.0 standard drinks     Comment: 1-2 drinks per week      If you drink alcohol do you typically have >3 drinks per day or >7 drinks per week? No    Alcohol Use 2019   Prescreen: >3 drinks/day or >7 drinks/week? No   Prescreen: >3 drinks/day or >7 drinks/week? -       He has a list:  Medication changes that have helped. Can he keep it where its at?  Hearing concerns, and skin concerns(facial melanoma), and would like shingles shot (pending cost).     Atorvastatin for hyperlipidemia. Taking every other day. This has resolved previously noted muscle aching. Is fasting today for labwork.    Hearing concerns. Difficulty in crowded rooms. Recommend audiology evaluation, he will consider.    Skin lesions, would like reviewed. Hx melanoma. Had derm visit 2.5 years ago, nothing has changed since that time.     Reviewed zoster vaccine options.     Current providers sharing in care for this patient include:   Patient Care Team:  Shayan Dixon MD as PCP - General (Internal Medicine)  Shayan Dixon MD as Assigned PCP    The following health maintenance items are reviewed in Epic and correct as of today:  Health Maintenance   Topic Date Due     ANNUAL REVIEW OF HM ORDERS  1940     ADVANCE CARE PLANNING  1940     ZOSTER IMMUNIZATION (2 of 3) 2011     MEDICARE ANNUAL WELLNESS VISIT  2019     FALL RISK ASSESSMENT  2019     DTAP/TDAP/TD IMMUNIZATION (3 - Td) 2025     PHQ-2  Completed     INFLUENZA VACCINE  Completed     PNEUMOCOCCAL IMMUNIZATION 65+ LOW/MEDIUM RISK  Completed     IPV IMMUNIZATION  Aged Out     MENINGITIS IMMUNIZATION  Aged Out           Review of Systems   Genitourinary: Negative for discharge and impotence.   Musculoskeletal: Positive for  "arthralgias and joint swelling.     Constitutional, HEENT, cardiovascular, pulmonary, GI, , musculoskeletal, neuro, skin, endocrine and psych systems are negative, except as otherwise noted.    OBJECTIVE:   /74 (BP Location: Right arm, Patient Position: Sitting, Cuff Size: Adult Regular)   Pulse 60   Temp 97.2  F (36.2  C) (Tympanic)   Resp 16   Ht 1.778 m (5' 10\")   Wt 80.2 kg (176 lb 14.4 oz)   SpO2 97%   BMI 25.38 kg/m   Estimated body mass index is 25.38 kg/m  as calculated from the following:    Height as of this encounter: 1.778 m (5' 10\").    Weight as of this encounter: 80.2 kg (176 lb 14.4 oz).  Physical Exam  GENERAL: healthy, alert and no distress  EYES: Eyes grossly normal to inspection, PERRL and conjunctivae and sclerae normal  HENT: ear canals and TM's normal, nose and mouth without ulcers or lesions  NECK: no adenopathy, no asymmetry, masses, or scars and thyroid normal to palpation  RESP: lungs clear to auscultation - no rales, rhonchi or wheezes  CV: regular rate and rhythm, normal S1 S2, no S3 or S4, no murmur, click or rub, no peripheral edema and peripheral pulses strong  ABDOMEN: soft, nontender, no hepatosplenomegaly, no masses and bowel sounds normal  MS: no gross musculoskeletal defects noted, no edema  SKIN: SK left cheek. Several pigmented nevi. Cherry hemangiomas scattered. Oval 1x2 mm lesion below the left eyelid.   NEURO: Normal strength and tone, mentation intact and speech normal  PSYCH: mentation appears normal, affect normal/bright    ASSESSMENT / PLAN:       ICD-10-CM    1. Encounter for Medicare annual wellness exam Z00.00 Comprehensive metabolic panel     Lipid panel reflex to direct LDL Fasting   2. Hyperlipidemia LDL goal <100 E78.5 atorvastatin (LIPITOR) 20 MG tablet     Lipid panel reflex to direct LDL Fasting   3. Screening for prostate cancer Z12.5 Prostate spec antigen screen       COUNSELING:  Reviewed preventive health counseling, as reflected in patient " "instructions    Estimated body mass index is 25.38 kg/m  as calculated from the following:    Height as of this encounter: 1.778 m (5' 10\").    Weight as of this encounter: 80.2 kg (176 lb 14.4 oz).     reports that he quit smoking about 21 years ago. He has never used smokeless tobacco.    Appropriate preventive services were discussed with this patient, including applicable screening as appropriate for cardiovascular disease, diabetes, osteopenia/osteoporosis, and glaucoma.  As appropriate for age/gender, discussed screening for colorectal cancer, prostate cancer. Checklist reviewing preventive services available has been given to the patient.    Reviewed patients plan of care and provided an AVS. The Basic Care Plan (routine screening as documented in Health Maintenance) for Cy meets the Care Plan requirement. This Care Plan has been established and reviewed with the Patient.    Counseling Resources:  ATP IV Guidelines  Pooled Cohorts Equation Calculator  Breast Cancer Risk Calculator  FRAX Risk Assessment  ICSI Preventive Guidelines  Dietary Guidelines for Americans, 2010  [employer]'s MyPlate  ASA Prophylaxis  Lung CA Screening    Shayan Dixon MD  [facility]    Identified Health Risks:  "

## 2019-12-23 ENCOUNTER — TELEPHONE (OUTPATIENT)
Dept: PEDIATRICS | Facility: CLINIC | Age: 79
End: 2019-12-23

## 2019-12-23 DIAGNOSIS — K57.30 DIVERTICULOSIS OF LARGE INTESTINE WITHOUT HEMORRHAGE: ICD-10-CM

## 2019-12-23 RX ORDER — CIPROFLOXACIN 500 MG/1
500 TABLET, FILM COATED ORAL 2 TIMES DAILY
Qty: 20 TABLET | Refills: 0 | Status: SHIPPED | OUTPATIENT
Start: 2019-12-23 | End: 2020-02-07

## 2019-12-23 RX ORDER — METRONIDAZOLE 500 MG/1
500 TABLET ORAL 2 TIMES DAILY
Qty: 20 TABLET | Refills: 0 | Status: SHIPPED | OUTPATIENT
Start: 2019-12-23 | End: 2020-02-07

## 2019-12-23 NOTE — TELEPHONE ENCOUNTER
Dr. Chavarria-    Spoke to pt. Concern for diverticulitis.     Had some significant pain last night. Is feeling ok right now. Temp orally of 99. You saw him for this in 2015. He thinks he would like to start the abx. Concerned about having this over the holiday. Mildred Marrero, RN on 12/23/2019 at 10:50 AM    6/26/2015  Diverticulosis, sigmoid. Hx of diverticulitis. Potentially his current pain sx represent early diverticulitis.  For now, watchful waiting.  If sx progress, start atbx.  See PI for additional instructions.      JAIME CHAVARRIA MD  [facility]

## 2019-12-23 NOTE — TELEPHONE ENCOUNTER
Reason for call:  Other   Patient called regarding (reason for call): Vaccine for diverticulitis?  Additional comments: Patients states that a few years back Dr. Dixon told him about a vaccine for diverticulitis. Patient would like to discuss this with a nurse.     Phone number to reach patient:  Home number on file  Telephone Information:   Mobile [phone]       Best Time:  any    Can we leave a detailed message on this number?  YES

## 2020-02-03 ENCOUNTER — TELEPHONE (OUTPATIENT)
Dept: PEDIATRICS | Facility: CLINIC | Age: 80
End: 2020-02-03

## 2020-02-03 NOTE — TELEPHONE ENCOUNTER
Reason for call:  Patient reporting a symptom    Symptom or request:   1)  proximal positional vertigo  2)  Fatigue      Duration (how long have symptoms been present): > 2 weeks    Have you been treated for this before? No    Additional comments: Please advise.  No appts available soon.      Phone Number patient can be reached at:  Cell number on file:    Telephone Information:   Mobile [phone]       Best Time:  any    Can we leave a detailed message on this number:  Not Applicable    Call taken on 2/3/2020 at 3:49 PM by Shawna Craven

## 2020-02-04 NOTE — TELEPHONE ENCOUNTER
1st attempt: left VM message to return call in regards to scheduling. Please assist pt in scheduling appt, OK to use am huddle or JUSTIN slot this Friday-next week. OK per PCP.    Abi Mooney MA 10:24 AM 2/4/2020

## 2020-02-04 NOTE — TELEPHONE ENCOUNTER
Shayan,     Is it okay to use some of huddle time or POA spots this on Friday 2/7 or next week for this pt?    Sidney Ramirez on 2/4/2020 at 7:56 AM

## 2020-02-06 ENCOUNTER — PRE VISIT (OUTPATIENT)
Dept: PEDIATRICS | Facility: CLINIC | Age: 80
End: 2020-02-06

## 2020-02-06 NOTE — PROGRESS NOTES
Subjective     Cleveland Long is a 79 year old male who presents to clinic today for the following health issues:    History of Present Illness        He eats 2-3 servings of fruits and vegetables daily.He consumes 0 sweetened beverage(s) daily.He exercises with enough effort to increase his heart rate 20 to 29 minutes per day.  He exercises with enough effort to increase his heart rate 3 or less days per week.   He is taking medications regularly.       Fatigue    Onset: winter season and lack of sunshine    Description:   Tired, not sleeping well    Intensity: unable to rate    Progression of Symptoms:  intermittent    Accompanying Signs & Symptoms:  none    Previous history of similar problem:   Pattern represents itself this time of the year, recent years    Precipitating factors:   Worsened by: less sleep, too much sleep causes pt to be groggy    Alleviating factors:  Improved by: none            Dizziness  Onset: 3 weeks ago     Description: When getting up or lying down, and you feel it more on the right side or right ear.   Do you feel faint:  no   Does it feel like the surroundings (bed, room) are moving: YES- just slightly   Unsteady/off balance: YES- to some extent   Have you passed out or fallen: no     Intensity: mild,  Sometimes moderate    Progression of Symptoms:  improving and intermittent    Accompanying Signs & Symptoms:  Heart palpitations: no   Nausea, vomiting: no   Weakness in arms or legs: no   Fatigue: YES  Vision or speech changes: no, vision becomes blurry during an episode  Ringing in ears (Tinnitus): no   Hearing Loss: YES    History:   Head trauma/concussion hx: no   Previous similar symptoms: no   Recent bleeding history: no     Precipitating factors:   Worse with activity or head movement: YES  Any new medications (BP?): no   Alcohol/drug abuse/withdrawal: no     Alleviating factors:   Does staying in a fixed position give relief:  no     Therapies Tried and outcome: none  "      Flare up of arthritis  Having more sx in the thumbs and right knee than typical.        Current Outpatient Medications   Medication Sig Dispense Refill     atorvastatin (LIPITOR) 20 MG tablet Take 1 tablet (20 mg) by mouth daily 90 tablet 3     Coenzyme Q10 (CO Q 10 PO)        multivitamin, therapeutic with minerals (MULTI-VITAMIN) TABS tablet Take 1 tablet by mouth daily       Cholecalciferol (VITAMIN D-3 PO)              Objective    /62 (BP Location: Right arm, Patient Position: Sitting, Cuff Size: Adult Regular)   Pulse 69   Temp 97.8  F (36.6  C) (Tympanic)   Resp 16   Ht 1.778 m (5' 10\")   Wt 79.8 kg (176 lb)   SpO2 96%   BMI 25.25 kg/m    Body mass index is 25.25 kg/m .  Physical Exam   GEN: no distress  SKIN: no rashes  HEENT: PERRL. EOMI. No nystagmus. TM's clear bilaterally. Nasal mucosa normal. OP moist without lesions.  NECK: Supple. No LAD or TM.  LUNGS: Clear to auscultation bilaterally. No rhonchi, rales, wheezes or retractions.  CV: Regular rate and rhythm.  No murmurs, rubs or gallops. Pulses 2+ radial.  EXTR: no edema  NEURO: no focal deficits     Yaz-Hallpike maneuver completed to the right. Vertigo with the head to the right and then worse in the final sitting upright position.         Assessment & Plan       ICD-10-CM    1. Fatigue, unspecified type R53.83 CBC with platelets     TSH with free T4 reflex     Vitamin D Deficiency   2. Benign paroxysmal positional vertigo of right ear H81.11 Vitamin D Deficiency     PHYSICAL THERAPY REFERRAL     Fatigue - dDx includes decreased physical activity, anemia, hypothyroid, D deficiency. Check labs. Restart D supplement.     Vertigo. Sx clinically c/w BPPV.  If sx are not resolved, plan PT evaluation next week if possible.   Referral placed    Shayan Dixon MD  [facility]    "

## 2020-02-07 ENCOUNTER — OFFICE VISIT (OUTPATIENT)
Dept: PEDIATRICS | Facility: CLINIC | Age: 80
End: 2020-02-07
Payer: COMMERCIAL

## 2020-02-07 VITALS
HEIGHT: 70 IN | WEIGHT: 176 LBS | OXYGEN SATURATION: 96 % | BODY MASS INDEX: 25.2 KG/M2 | HEART RATE: 69 BPM | SYSTOLIC BLOOD PRESSURE: 120 MMHG | DIASTOLIC BLOOD PRESSURE: 62 MMHG | TEMPERATURE: 97.8 F | RESPIRATION RATE: 16 BRPM

## 2020-02-07 DIAGNOSIS — R53.83 FATIGUE, UNSPECIFIED TYPE: Primary | ICD-10-CM

## 2020-02-07 DIAGNOSIS — H81.11 BENIGN PAROXYSMAL POSITIONAL VERTIGO OF RIGHT EAR: ICD-10-CM

## 2020-02-07 LAB
ERYTHROCYTE [DISTWIDTH] IN BLOOD BY AUTOMATED COUNT: 12.9 % (ref 10–15)
HCT VFR BLD AUTO: 44.5 % (ref 40–53)
HGB BLD-MCNC: 15 G/DL (ref 13.3–17.7)
MCH RBC QN AUTO: 31.8 PG (ref 26.5–33)
MCHC RBC AUTO-ENTMCNC: 33.7 G/DL (ref 31.5–36.5)
MCV RBC AUTO: 94 FL (ref 78–100)
PLATELET # BLD AUTO: 235 10E9/L (ref 150–450)
RBC # BLD AUTO: 4.72 10E12/L (ref 4.4–5.9)
TSH SERPL DL<=0.005 MIU/L-ACNC: 3.6 MU/L (ref 0.4–4)
WBC # BLD AUTO: 8.1 10E9/L (ref 4–11)

## 2020-02-07 PROCEDURE — 99213 OFFICE O/P EST LOW 20 MIN: CPT | Performed by: INTERNAL MEDICINE

## 2020-02-07 PROCEDURE — 36415 COLL VENOUS BLD VENIPUNCTURE: CPT | Performed by: INTERNAL MEDICINE

## 2020-02-07 PROCEDURE — 82306 VITAMIN D 25 HYDROXY: CPT | Performed by: INTERNAL MEDICINE

## 2020-02-07 PROCEDURE — 84443 ASSAY THYROID STIM HORMONE: CPT | Performed by: INTERNAL MEDICINE

## 2020-02-07 PROCEDURE — 85027 COMPLETE CBC AUTOMATED: CPT | Performed by: INTERNAL MEDICINE

## 2020-02-07 ASSESSMENT — MIFFLIN-ST. JEOR: SCORE: 1509.58

## 2020-02-07 NOTE — LETTER
[facility]  [address]  [place] [address]                  [phone]   February 10, 2020    Cy BAYRON Long  [address]  [place] [address]      Cy:     Your tests are all complete. The results show:     1. Your blood counts are normal.     2. Your TSH (thyroid function) is normal.     3. Your vitamin D level is also normal.     I do not see a cause for your fatigue based on these labs.       Please feel free to contact me if you have any questions or concerns.       Shayan Dixon        Results for orders placed or performed in visit on 02/07/20   CBC with platelets     Status: None   Result Value Ref Range    WBC 8.1 4.0 - 11.0 10e9/L    RBC Count 4.72 4.4 - 5.9 10e12/L    Hemoglobin 15.0 13.3 - 17.7 g/dL    Hematocrit 44.5 40.0 - 53.0 %    MCV 94 78 - 100 fl    MCH 31.8 26.5 - 33.0 pg    MCHC 33.7 31.5 - 36.5 g/dL    RDW 12.9 10.0 - 15.0 %    Platelet Count 235 150 - 450 10e9/L   TSH with free T4 reflex     Status: None   Result Value Ref Range    TSH 3.60 0.40 - 4.00 mU/L   Vitamin D Deficiency     Status: None   Result Value Ref Range    Vitamin D Deficiency screening 35 20 - 75 ug/L

## 2020-02-10 LAB — DEPRECATED CALCIDIOL+CALCIFEROL SERPL-MC: 35 UG/L (ref 20–75)

## 2020-02-24 ENCOUNTER — MYC MEDICAL ADVICE (OUTPATIENT)
Dept: PEDIATRICS | Facility: CLINIC | Age: 80
End: 2020-02-24

## 2020-06-02 ENCOUNTER — OFFICE VISIT (OUTPATIENT)
Dept: URGENT CARE | Facility: URGENT CARE | Age: 80
End: 2020-06-02
Payer: COMMERCIAL

## 2020-06-02 VITALS
TEMPERATURE: 97.4 F | WEIGHT: 180.9 LBS | HEART RATE: 68 BPM | DIASTOLIC BLOOD PRESSURE: 60 MMHG | SYSTOLIC BLOOD PRESSURE: 120 MMHG | BODY MASS INDEX: 25.96 KG/M2 | OXYGEN SATURATION: 99 %

## 2020-06-02 DIAGNOSIS — M79.644 PAIN OF FINGER OF RIGHT HAND: Primary | ICD-10-CM

## 2020-06-02 PROCEDURE — 99213 OFFICE O/P EST LOW 20 MIN: CPT | Performed by: PHYSICIAN ASSISTANT

## 2020-06-02 NOTE — PROGRESS NOTES
SUBJECTIVE:  Chief Complaint   Patient presents with     Urgent Care     Finger     pt was assisting his wife stirring dough and after stirring he notice his pinky is locked.      Cleveland Long is a 80 year old male presents with a chief complaint of right finger  fifth locked, painful to move.  The injury occurred 1 day(s) ago.   The injury happened while at home. How: as abovedeformity was immediately noted.  The patient complained of moderate pain  and has had decreased ROM.  Pain exacerbated by movement.  Relieved by rest.  He treated it initially with no therapy. This is the first time this type of injury has occurred to this patient.     Past Medical History:   Diagnosis Date     Diverticulitis of colon      Current Outpatient Medications   Medication Sig Dispense Refill     atorvastatin (LIPITOR) 20 MG tablet Take 1 tablet (20 mg) by mouth daily 90 tablet 3     Coenzyme Q10 (CO Q 10 PO)        multivitamin, therapeutic with minerals (MULTI-VITAMIN) TABS tablet Take 1 tablet by mouth daily       Cholecalciferol (VITAMIN D-3 PO)        Social History     Tobacco Use     Smoking status: Former Smoker     Last attempt to quit: 1998     Years since quittin.3     Smokeless tobacco: Never Used   Substance Use Topics     Alcohol use: Yes     Alcohol/week: 0.0 standard drinks     Comment: 1-2 drinks per week        ROS:  10 point ROS negative except as listed above      EXAM:   /60   Pulse 68   Temp 97.4  F (36.3  C) (Tympanic)   Wt 82.1 kg (180 lb 14.4 oz)   SpO2 99%   BMI 25.96 kg/m    Gen: healthy,alert,no distress  Extremity: LOCKED MTC/pip JOINT OF RIGHT 5TH FINGER      X-RAY DEFERRED    ASSESSMENT:   (M79.644) Pain of finger of right hand  (primary encounter diagnosis)  Comment: ACUTE TRIGGER FINGER  Plan: SENT TO ORTHO

## 2020-10-30 DIAGNOSIS — E78.5 HYPERLIPIDEMIA LDL GOAL <100: ICD-10-CM

## 2020-11-02 RX ORDER — ATORVASTATIN CALCIUM 20 MG/1
10 TABLET, FILM COATED ORAL DAILY
Qty: 90 TABLET | Refills: 3 | Status: SHIPPED | OUTPATIENT
Start: 2020-11-02 | End: 2021-05-11 | Stop reason: ALTCHOICE

## 2020-11-02 NOTE — TELEPHONE ENCOUNTER
See below.      Routing refill request to provider for review/approval because:  States taking 1/2 tab.  Do you want to update directions.  Should he be taking 1 tab?  Please advise.      Radha Patton RN

## 2021-01-15 ENCOUNTER — HEALTH MAINTENANCE LETTER (OUTPATIENT)
Age: 81
End: 2021-01-15

## 2021-01-29 ENCOUNTER — IMMUNIZATION (OUTPATIENT)
Dept: NURSING | Facility: CLINIC | Age: 81
End: 2021-01-29
Payer: COMMERCIAL

## 2021-01-29 PROCEDURE — 0001A PR COVID VAC PFIZER DIL RECON 30 MCG/0.3 ML IM: CPT

## 2021-01-29 PROCEDURE — 91300 PR COVID VAC PFIZER DIL RECON 30 MCG/0.3 ML IM: CPT

## 2021-02-19 ENCOUNTER — IMMUNIZATION (OUTPATIENT)
Dept: NURSING | Facility: CLINIC | Age: 81
End: 2021-02-19
Attending: INTERNAL MEDICINE
Payer: COMMERCIAL

## 2021-02-19 PROCEDURE — 91300 PR COVID VAC PFIZER DIL RECON 30 MCG/0.3 ML IM: CPT

## 2021-02-19 PROCEDURE — 0002A PR COVID VAC PFIZER DIL RECON 30 MCG/0.3 ML IM: CPT

## 2021-02-22 ENCOUNTER — TRANSFERRED RECORDS (OUTPATIENT)
Dept: HEALTH INFORMATION MANAGEMENT | Facility: CLINIC | Age: 81
End: 2021-02-22

## 2021-03-15 ENCOUNTER — HOSPITAL ENCOUNTER (OUTPATIENT)
Dept: GENERAL RADIOLOGY | Facility: CLINIC | Age: 81
Discharge: HOME | End: 2021-03-15
Attending: INTERNAL MEDICINE | Admitting: INTERNAL MEDICINE
Payer: COMMERCIAL

## 2021-03-15 DIAGNOSIS — M25.541 ARTHRALGIA OF BOTH HANDS: ICD-10-CM

## 2021-03-15 DIAGNOSIS — M25.542 ARTHRALGIA OF BOTH HANDS: ICD-10-CM

## 2021-03-15 PROCEDURE — 73130 X-RAY EXAM OF HAND: CPT | Mod: 50

## 2021-03-25 ENCOUNTER — TRANSFERRED RECORDS (OUTPATIENT)
Dept: HEALTH INFORMATION MANAGEMENT | Facility: CLINIC | Age: 81
End: 2021-03-25

## 2021-04-06 ASSESSMENT — ENCOUNTER SYMPTOMS
ARTHRALGIAS: 1
JOINT SWELLING: 1

## 2021-04-06 ASSESSMENT — ACTIVITIES OF DAILY LIVING (ADL): CURRENT_FUNCTION: NO ASSISTANCE NEEDED

## 2021-04-09 ENCOUNTER — OFFICE VISIT (OUTPATIENT)
Dept: PEDIATRICS | Facility: CLINIC | Age: 81
End: 2021-04-09
Payer: COMMERCIAL

## 2021-04-09 VITALS
HEART RATE: 68 BPM | OXYGEN SATURATION: 97 % | RESPIRATION RATE: 16 BRPM | BODY MASS INDEX: 25.06 KG/M2 | WEIGHT: 185 LBS | TEMPERATURE: 96.4 F | DIASTOLIC BLOOD PRESSURE: 60 MMHG | SYSTOLIC BLOOD PRESSURE: 114 MMHG | HEIGHT: 72 IN

## 2021-04-09 DIAGNOSIS — G25.81 RESTLESS LEG SYNDROME: ICD-10-CM

## 2021-04-09 DIAGNOSIS — Z00.00 ENCOUNTER FOR MEDICARE ANNUAL WELLNESS EXAM: Primary | ICD-10-CM

## 2021-04-09 DIAGNOSIS — Z12.5 SCREENING FOR PROSTATE CANCER: ICD-10-CM

## 2021-04-09 DIAGNOSIS — E78.5 HYPERLIPIDEMIA LDL GOAL <100: ICD-10-CM

## 2021-04-09 PROCEDURE — 99397 PER PM REEVAL EST PAT 65+ YR: CPT | Performed by: INTERNAL MEDICINE

## 2021-04-09 PROCEDURE — 99213 OFFICE O/P EST LOW 20 MIN: CPT | Mod: 25 | Performed by: INTERNAL MEDICINE

## 2021-04-09 RX ORDER — ROPINIROLE 0.25 MG/1
0.25 TABLET, FILM COATED ORAL AT BEDTIME
Qty: 30 TABLET | Refills: 1 | Status: SHIPPED | OUTPATIENT
Start: 2021-04-09 | End: 2021-05-11 | Stop reason: SINTOL

## 2021-04-09 RX ORDER — ATORVASTATIN CALCIUM 10 MG/1
10 TABLET, FILM COATED ORAL DAILY
Qty: 90 TABLET | Refills: 3 | Status: SHIPPED | OUTPATIENT
Start: 2021-04-09 | End: 2022-03-17

## 2021-04-09 ASSESSMENT — MIFFLIN-ST. JEOR: SCORE: 1577.15

## 2021-04-09 ASSESSMENT — ENCOUNTER SYMPTOMS
JOINT SWELLING: 1
ARTHRALGIAS: 1

## 2021-04-09 ASSESSMENT — ACTIVITIES OF DAILY LIVING (ADL): CURRENT_FUNCTION: NO ASSISTANCE NEEDED

## 2021-04-09 NOTE — PATIENT INSTRUCTIONS
Patient Education   Personalized Prevention Plan  You are due for the preventive services outlined below.  Your care team is available to assist you in scheduling these services.  If you have already completed any of these items, please share that information with your care team to update in your medical record.  Health Maintenance Due   Topic Date Due     ANNUAL REVIEW OF HM ORDERS  Never done     Annual Wellness Visit  11/11/2020     FALL RISK ASSESSMENT  11/11/2020

## 2021-04-09 NOTE — PROGRESS NOTES
"SUBJECTIVE:   Cleveland Long is a 80 year old male who presents for Preventive Visit.    Patient has been advised of split billing requirements and indicates understanding: Yes   Are you in the first 12 months of your Medicare coverage?  No    Healthy Habits:     In general, how would you rate your overall health?  Good    Frequency of exercise:  2-3 days/week    Duration of exercise:  15-30 minutes    Do you usually eat at least 4 servings of fruit and vegetables a day, include whole grains    & fiber and avoid regularly eating high fat or \"junk\" foods?  Yes    Taking medications regularly:  Yes    Medication side effects:  None    Ability to successfully perform activities of daily living:  No assistance needed    Home Safety:  No safety concerns identified    Hearing Impairment:  Difficulty following a conversation in a noisy restaurant or crowded room    In the past 6 months, have you been bothered by leaking of urine?  No    In general, how would you rate your overall mental or emotional health?  Good      PHQ-2 Total Score: 0    Additional concerns today:  No    Do you feel safe in your environment? Yes    Have you ever done Advance Care Planning? (For example, a Health Directive, POLST, or a discussion with a medical provider or your loved ones about your wishes): Yes, advance care planning is on file.    Fall risk  Fallen 2 or more times in the past year?: No  Any fall with injury in the past year?: No  Cognitive Screening   1) Repeat 3 items (Leader, Season, Table)    2) Clock draw: NORMAL  3) 3 item recall: Recalls 3 objects  Results: 3 items recalled: COGNITIVE IMPAIRMENT LESS LIKELY    Mini-CogTM Copyright S Herb. Licensed by the author for use in [facility]; reprinted with permission (kadi@.[facility]). All rights reserved.      Do you have sleep apnea, excessive snoring or daytime drowsiness?: no    Hyperlipidemia, ASCVD. No cardiac sx such as CP, palpitations, PND, orthopnea, LOPEZ or new " peripheral edema. Tolerating statin.   Lab Results   Component Value Date    LDL 82 2019    LDL 70 2018     Noting leg issues at nighttime. Frequent feeling of need for movement, often involuntary movement of his legs. Also has intermittent cramping in the calf muscles. Not treated for any LE sx at this time.      Social History     Tobacco Use     Smoking status: Former Smoker     Quit date: 1998     Years since quittin.1     Smokeless tobacco: Never Used   Substance Use Topics     Alcohol use: Yes     Alcohol/week: 0.0 standard drinks     Comment: 1-2 drinks per week        Alcohol Use 2021   Prescreen: >3 drinks/day or >7 drinks/week? No   Prescreen: >3 drinks/day or >7 drinks/week? -           Current providers sharing in care for this patient include:   Patient Care Team:  Shayan Dixon MD as PCP - General (Internal Medicine)  Shayan Dixon MD as Assigned PCP    The following health maintenance items are reviewed in Epic and correct as of today:  Health Maintenance Due   Topic Date Due     ANNUAL REVIEW OF HM ORDERS  Never done     MEDICARE ANNUAL WELLNESS VISIT  2020     FALL RISK ASSESSMENT  2020       Review of Systems   HENT: Positive for hearing loss.    Genitourinary: Negative for discharge and impotence.   Musculoskeletal: Positive for arthralgias and joint swelling.       OBJECTIVE:   /60   Pulse 68   Temp 96.4  F (35.8  C) (Tympanic)   Resp 16   Ht 1.829 m (6')   Wt 83.9 kg (185 lb)   SpO2 97%   BMI 25.09 kg/m   Estimated body mass index is 25.09 kg/m  as calculated from the following:    Height as of this encounter: 1.829 m (6').    Weight as of this encounter: 83.9 kg (185 lb).  Physical Exam  GENERAL: healthy, alert and no distress  EYES: Eyes grossly normal to inspection, PERRL and conjunctivae and sclerae normal  HENT: ear canals and TM's normal  NECK: no adenopathy, no asymmetry, masses, or scars and thyroid normal to palpation  RESP: lungs  clear to auscultation - no rales, rhonchi or wheezes  CV: regular rate and rhythm, normal S1 S2, no murmur, click or rub, no peripheral edema and peripheral pulses strong  ABDOMEN: soft, nontender, bowel sounds normal  MS: no gross musculoskeletal defects noted, no edema  SKIN: no suspicious lesions or rashes  NEURO: Normal strength and tone, mentation intact and speech normal  PSYCH: mentation appears normal, affect normal/bright        ASSESSMENT / PLAN:       ICD-10-CM    1. Encounter for Medicare annual wellness exam  Z00.00 Comprehensive metabolic panel     Lipid panel reflex to direct LDL Fasting   2. Hyperlipidemia LDL goal <100  E78.5 atorvastatin (LIPITOR) 10 MG tablet     Lipid panel reflex to direct LDL Fasting   3. Restless leg syndrome  G25.81 rOPINIRole (REQUIP) 0.25 MG tablet   4. Screening for prostate cancer  Z12.5 Prostate spec antigen screen     Leg sx c/w RLS. Also cramps, likely separate issue. Reviewed options. Will try ropinirole. Titrate dose up as needed for sx control      COUNSELING:  Reviewed preventive health counseling, as reflected in patient instructions    Estimated body mass index is 25.09 kg/m  as calculated from the following:    Height as of this encounter: 1.829 m (6').    Weight as of this encounter: 83.9 kg (185 lb).        He reports that he quit smoking about 23 years ago. He has never used smokeless tobacco.      Appropriate preventive services were discussed with this patient, including applicable screening as appropriate for cardiovascular disease, diabetes, osteopenia/osteoporosis, and glaucoma.  As appropriate for age/gender, discussed screening for colorectal cancer, prostate cancer. Checklist reviewing preventive services available has been given to the patient.    Reviewed patients plan of care and provided an AVS. The Basic Care Plan (routine screening as documented in Health Maintenance) for Cy meets the Care Plan requirement. This Care Plan has been established  and reviewed with the Patient.    Counseling Resources:  ATP IV Guidelines  Pooled Cohorts Equation Calculator  Breast Cancer Risk Calculator  Breast Cancer: Medication to Reduce Risk  FRAX Risk Assessment  ICSI Preventive Guidelines  Dietary Guidelines for Americans, 2010  USDA's MyPlate  ASA Prophylaxis  Lung CA Screening    Shayan Dixon MD  [facility]    Identified Health Risks:

## 2021-04-14 DIAGNOSIS — Z12.5 SCREENING FOR PROSTATE CANCER: ICD-10-CM

## 2021-04-14 DIAGNOSIS — Z00.00 ENCOUNTER FOR MEDICARE ANNUAL WELLNESS EXAM: ICD-10-CM

## 2021-04-14 DIAGNOSIS — E78.5 HYPERLIPIDEMIA LDL GOAL <100: ICD-10-CM

## 2021-04-14 LAB
ALBUMIN SERPL-MCNC: 3.7 G/DL (ref 3.4–5)
ALP SERPL-CCNC: 45 U/L (ref 40–150)
ALT SERPL W P-5'-P-CCNC: 35 U/L (ref 0–70)
ANION GAP SERPL CALCULATED.3IONS-SCNC: 6 MMOL/L (ref 3–14)
AST SERPL W P-5'-P-CCNC: 23 U/L (ref 0–45)
BILIRUB SERPL-MCNC: 1.3 MG/DL (ref 0.2–1.3)
BUN SERPL-MCNC: 16 MG/DL (ref 7–30)
CALCIUM SERPL-MCNC: 9.4 MG/DL (ref 8.5–10.1)
CHLORIDE SERPL-SCNC: 108 MMOL/L (ref 94–109)
CHOLEST SERPL-MCNC: 155 MG/DL
CO2 SERPL-SCNC: 24 MMOL/L (ref 20–32)
CREAT SERPL-MCNC: 0.98 MG/DL (ref 0.66–1.25)
GFR SERPL CREATININE-BSD FRML MDRD: 71 ML/MIN/{1.73_M2}
GLUCOSE SERPL-MCNC: 89 MG/DL (ref 70–99)
HDLC SERPL-MCNC: 70 MG/DL
LDLC SERPL CALC-MCNC: 67 MG/DL
NONHDLC SERPL-MCNC: 85 MG/DL
POTASSIUM SERPL-SCNC: 4 MMOL/L (ref 3.4–5.3)
PROT SERPL-MCNC: 6.9 G/DL (ref 6.8–8.8)
PSA SERPL-ACNC: 1.95 UG/L (ref 0–4)
SODIUM SERPL-SCNC: 139 MMOL/L (ref 133–144)
TRIGL SERPL-MCNC: 92 MG/DL

## 2021-04-14 PROCEDURE — 36415 COLL VENOUS BLD VENIPUNCTURE: CPT | Performed by: INTERNAL MEDICINE

## 2021-04-14 PROCEDURE — 80061 LIPID PANEL: CPT | Performed by: INTERNAL MEDICINE

## 2021-04-14 PROCEDURE — 80053 COMPREHEN METABOLIC PANEL: CPT | Performed by: INTERNAL MEDICINE

## 2021-04-14 PROCEDURE — G0103 PSA SCREENING: HCPCS | Performed by: INTERNAL MEDICINE

## 2021-05-09 ENCOUNTER — MYC MEDICAL ADVICE (OUTPATIENT)
Dept: PEDIATRICS | Facility: CLINIC | Age: 81
End: 2021-05-09

## 2021-09-04 ENCOUNTER — HEALTH MAINTENANCE LETTER (OUTPATIENT)
Age: 81
End: 2021-09-04

## 2021-09-12 ENCOUNTER — MYC MEDICAL ADVICE (OUTPATIENT)
Dept: PEDIATRICS | Facility: CLINIC | Age: 81
End: 2021-09-12

## 2021-09-12 DIAGNOSIS — B35.6 TINEA CRURIS: ICD-10-CM

## 2021-09-13 RX ORDER — NYSTATIN 100000 U/G
CREAM TOPICAL 3 TIMES DAILY
Qty: 60 G | Refills: 11 | Status: SHIPPED | OUTPATIENT
Start: 2021-09-13 | End: 2024-03-03

## 2022-05-13 SDOH — ECONOMIC STABILITY: INCOME INSECURITY: HOW HARD IS IT FOR YOU TO PAY FOR THE VERY BASICS LIKE FOOD, HOUSING, MEDICAL CARE, AND HEATING?: SOMEWHAT HARD

## 2022-05-13 SDOH — ECONOMIC STABILITY: TRANSPORTATION INSECURITY
IN THE PAST 12 MONTHS, HAS THE LACK OF TRANSPORTATION KEPT YOU FROM MEDICAL APPOINTMENTS OR FROM GETTING MEDICATIONS?: NO

## 2022-05-13 SDOH — ECONOMIC STABILITY: FOOD INSECURITY: WITHIN THE PAST 12 MONTHS, YOU WORRIED THAT YOUR FOOD WOULD RUN OUT BEFORE YOU GOT MONEY TO BUY MORE.: NEVER TRUE

## 2022-05-13 SDOH — ECONOMIC STABILITY: TRANSPORTATION INSECURITY
IN THE PAST 12 MONTHS, HAS LACK OF TRANSPORTATION KEPT YOU FROM MEETINGS, WORK, OR FROM GETTING THINGS NEEDED FOR DAILY LIVING?: NO

## 2022-05-13 SDOH — HEALTH STABILITY: PHYSICAL HEALTH: ON AVERAGE, HOW MANY MINUTES DO YOU ENGAGE IN EXERCISE AT THIS LEVEL?: 30 MIN

## 2022-05-13 SDOH — HEALTH STABILITY: PHYSICAL HEALTH: ON AVERAGE, HOW MANY DAYS PER WEEK DO YOU ENGAGE IN MODERATE TO STRENUOUS EXERCISE (LIKE A BRISK WALK)?: 2 DAYS

## 2022-05-13 SDOH — ECONOMIC STABILITY: INCOME INSECURITY: IN THE LAST 12 MONTHS, WAS THERE A TIME WHEN YOU WERE NOT ABLE TO PAY THE MORTGAGE OR RENT ON TIME?: NO

## 2022-05-13 ASSESSMENT — ENCOUNTER SYMPTOMS
NAUSEA: 0
JOINT SWELLING: 1
CHILLS: 0
ARTHRALGIAS: 1
COUGH: 0
FREQUENCY: 0
CONSTIPATION: 0
HEADACHES: 0
WEAKNESS: 0
HEMATOCHEZIA: 0
DIARRHEA: 0
DIZZINESS: 0
ABDOMINAL PAIN: 0
MYALGIAS: 1
HEMATURIA: 0
SORE THROAT: 0
PARESTHESIAS: 0
SHORTNESS OF BREATH: 0
EYE PAIN: 0
PALPITATIONS: 0
FEVER: 0
HEARTBURN: 0
NERVOUS/ANXIOUS: 0
DYSURIA: 0

## 2022-05-13 ASSESSMENT — SOCIAL DETERMINANTS OF HEALTH (SDOH)
HOW OFTEN DO YOU GET TOGETHER WITH FRIENDS OR RELATIVES?: TWICE A WEEK
HOW OFTEN DO YOU ATTEND CHURCH OR RELIGIOUS SERVICES?: MORE THAN 4 TIMES PER YEAR
IN A TYPICAL WEEK, HOW MANY TIMES DO YOU TALK ON THE PHONE WITH FAMILY, FRIENDS, OR NEIGHBORS?: MORE THAN THREE TIMES A WEEK
DO YOU BELONG TO ANY CLUBS OR ORGANIZATIONS SUCH AS CHURCH GROUPS UNIONS, FRATERNAL OR ATHLETIC GROUPS, OR SCHOOL GROUPS?: YES

## 2022-05-13 ASSESSMENT — LIFESTYLE VARIABLES
AUDIT-C TOTAL SCORE: 3
HOW MANY STANDARD DRINKS CONTAINING ALCOHOL DO YOU HAVE ON A TYPICAL DAY: 1 OR 2
HOW OFTEN DO YOU HAVE A DRINK CONTAINING ALCOHOL: 2-3 TIMES A WEEK
HOW OFTEN DO YOU HAVE SIX OR MORE DRINKS ON ONE OCCASION: NEVER
SKIP TO QUESTIONS 9-10: 1

## 2022-05-13 ASSESSMENT — ACTIVITIES OF DAILY LIVING (ADL): CURRENT_FUNCTION: NO ASSISTANCE NEEDED

## 2022-05-16 ENCOUNTER — OFFICE VISIT (OUTPATIENT)
Dept: PEDIATRICS | Facility: CLINIC | Age: 82
End: 2022-05-16
Payer: COMMERCIAL

## 2022-05-16 VITALS
OXYGEN SATURATION: 97 % | DIASTOLIC BLOOD PRESSURE: 54 MMHG | HEIGHT: 70 IN | RESPIRATION RATE: 12 BRPM | TEMPERATURE: 97.6 F | WEIGHT: 176.5 LBS | BODY MASS INDEX: 25.27 KG/M2 | SYSTOLIC BLOOD PRESSURE: 118 MMHG | HEART RATE: 70 BPM

## 2022-05-16 DIAGNOSIS — Z12.5 SCREENING FOR PROSTATE CANCER: ICD-10-CM

## 2022-05-16 DIAGNOSIS — R19.8 ALTERED BOWEL FUNCTION: ICD-10-CM

## 2022-05-16 DIAGNOSIS — Z00.00 ROUTINE GENERAL MEDICAL EXAMINATION AT A HEALTH CARE FACILITY: Primary | ICD-10-CM

## 2022-05-16 DIAGNOSIS — E78.5 HYPERLIPIDEMIA LDL GOAL <100: ICD-10-CM

## 2022-05-16 PROCEDURE — G0103 PSA SCREENING: HCPCS | Performed by: INTERNAL MEDICINE

## 2022-05-16 PROCEDURE — 99213 OFFICE O/P EST LOW 20 MIN: CPT | Mod: 25 | Performed by: INTERNAL MEDICINE

## 2022-05-16 PROCEDURE — 36415 COLL VENOUS BLD VENIPUNCTURE: CPT | Performed by: INTERNAL MEDICINE

## 2022-05-16 PROCEDURE — 99397 PER PM REEVAL EST PAT 65+ YR: CPT | Performed by: INTERNAL MEDICINE

## 2022-05-16 PROCEDURE — 80053 COMPREHEN METABOLIC PANEL: CPT | Performed by: INTERNAL MEDICINE

## 2022-05-16 PROCEDURE — 80061 LIPID PANEL: CPT | Performed by: INTERNAL MEDICINE

## 2022-05-16 RX ORDER — ATORVASTATIN CALCIUM 10 MG/1
10 TABLET, FILM COATED ORAL DAILY
Qty: 90 TABLET | Refills: 4 | Status: SHIPPED | OUTPATIENT
Start: 2022-05-16 | End: 2023-08-11

## 2022-05-16 ASSESSMENT — ENCOUNTER SYMPTOMS
ABDOMINAL PAIN: 0
CHILLS: 0
DYSURIA: 0
SORE THROAT: 0
CONSTIPATION: 0
NERVOUS/ANXIOUS: 0
WEAKNESS: 0
JOINT SWELLING: 1
MYALGIAS: 1
FREQUENCY: 0
EYE PAIN: 0
DIZZINESS: 0
DIARRHEA: 0
FEVER: 0
ARTHRALGIAS: 1
HEMATURIA: 0
HEMATOCHEZIA: 0
PALPITATIONS: 0
HEARTBURN: 0
NAUSEA: 0
HEADACHES: 0
SHORTNESS OF BREATH: 0
COUGH: 0
PARESTHESIAS: 0

## 2022-05-16 ASSESSMENT — ACTIVITIES OF DAILY LIVING (ADL): CURRENT_FUNCTION: NO ASSISTANCE NEEDED

## 2022-05-16 NOTE — PROGRESS NOTES
"SUBJECTIVE:   Cleveland Long is a 82 year old male who presents for Preventive Visit.    Healthy Habits:     In general, how would you rate your overall health?  Excellent    Duration of exercise:  15-30 minutes    Do you usually eat at least 4 servings of fruit and vegetables a day, include whole grains    & fiber and avoid regularly eating high fat or \"junk\" foods?  Yes    Taking medications regularly:  Yes    Ability to successfully perform activities of daily living:  No assistance needed    Home Safety:  No safety concerns identified    Hearing Impairment:  Difficulty following a conversation in a noisy restaurant or crowded room    In the past 6 months, have you been bothered by leaking of urine?  No    In general, how would you rate your overall mental or emotional health?  Excellent      PHQ-2 Total Score: 0    Additional concerns today:  Yes    Do you feel safe in your environment? Yes    Have you ever done Advance Care Planning? (For example, a Health Directive, POLST, or a discussion with a medical provider or your loved ones about your wishes): Yes, patient states has an Advance Care Planning document and will bring a copy to the clinic.    Alteration of bowel movements.  \"Use to be one and done! Now, several movements to void; can be encouraged with a  little pressure applied close to the rectum on my right side.\"  Last colonoscopy was in 2012. Had one polyp removed, pathology cannot be found in his chart.  Was told he does not need additional colonoscopies.   No blood, mucus, change in caliber.     Hyperlipidemia. Is fasting today. He stopped his statin 3 months ago. Will review today's labwork to determine whether he needs to restart or not.     Fall risk    Fallen 2 or more times in the past year?: No  Any fall with injury in the past year?: No      Cognitive Screening     1) Repeat 3 items (Leader, Season, Table)      2) Clock draw: NORMAL     3) 3 item recall: Recalls 3 objects  Results: 3 items " recalled: COGNITIVE IMPAIRMENT LESS LIKELY    Mini-CogTM Copyright S Herb. Licensed by the author for use in [facility]; reprinted with permission (kadi@[facility]). All rights reserved.      Do you have sleep apnea, excessive snoring or daytime drowsiness?: yes   Pt states excessive snoring.      Social History     Tobacco Use     Smoking status: Former Smoker     Packs/day: 0.25     Years: 40.00     Pack years: 10.00     Types: Cigarettes     Start date: 1958     Quit date: 1998     Years since quittin.2     Smokeless tobacco: Never Used   Substance Use Topics     Alcohol use: Yes     Comment: average 1 beer/wine 4x's a week       Alcohol Use 2022   Prescreen: >3 drinks/day or >7 drinks/week? No   Prescreen: >3 drinks/day or >7 drinks/week? -       Current providers sharing in care for this patient include:   Patient Care Team:  Shayan Dixon MD as PCP - General (Internal Medicine)  Shayan Dixon MD as Assigned PCP    The following health maintenance items are reviewed in Epic and correct as of today:  Health Maintenance Due   Topic Date Due     ANNUAL REVIEW OF HM ORDERS  Never done     ZOSTER IMMUNIZATION (2 of 3) 2011         Review of Systems   Constitutional: Negative for chills and fever.   HENT: Positive for hearing loss. Negative for congestion, ear pain and sore throat.    Eyes: Negative for pain and visual disturbance.   Respiratory: Negative for cough and shortness of breath.    Cardiovascular: Negative for chest pain, palpitations and peripheral edema.   Gastrointestinal: Negative for abdominal pain, constipation, diarrhea, heartburn, hematochezia and nausea.   Genitourinary: Negative for dysuria, frequency, genital sores, hematuria, impotence, penile discharge and urgency.   Musculoskeletal: Positive for arthralgias, joint swelling and myalgias.   Skin: Negative for rash.   Neurological: Negative for dizziness, weakness, headaches and paresthesias.  "  Psychiatric/Behavioral: Negative for mood changes. The patient is not nervous/anxious.        OBJECTIVE:   /54 (BP Location: Right arm, Patient Position: Sitting, Cuff Size: Adult Regular)   Pulse 70   Temp 97.6  F (36.4  C) (Temporal)   Resp 12   Ht 1.765 m (5' 9.5\")   Wt 80.1 kg (176 lb 8 oz)   SpO2 97%   BMI 25.69 kg/m   Estimated body mass index is 25.69 kg/m  as calculated from the following:    Height as of this encounter: 1.765 m (5' 9.5\").    Weight as of this encounter: 80.1 kg (176 lb 8 oz).  Physical Exam  GENERAL: healthy, alert and no distress  EYES: PERRL, EOMI  HENT: ear canals and TM's normal  NECK: no adenopathy  RESP: lungs clear to auscultation - no rales, rhonchi or wheezes  CV: regular rate and rhythm, normal S1 S2, no murmur, no peripheral edema and peripheral pulses strong  ABDOMEN: soft, nontender, bowel sounds normal  MS: no gross musculoskeletal defects noted, no edema  SKIN: no suspicious lesions or rashes  NEURO: Normal strength and tone  PSYCH: mentation appears normal, affect normal/bright     ASSESSMENT / PLAN:       ICD-10-CM    1. Routine general medical examination at a health care facility  Z00.00 Lipid panel reflex to direct LDL Fasting     Comprehensive metabolic panel   2. Altered bowel function  R19.8 Adult Gastro Ref - Consult Only   3. Hyperlipidemia LDL goal <100  E78.5 atorvastatin (LIPITOR) 10 MG tablet     Lipid panel reflex to direct LDL Fasting   4. Screening for prostate cancer  Z12.5 Prostate Specific Antigen Screen     Discussed options to further evaluate BM changes. Will refer to gastroenterology. Unclear if flex sig, colonoscopy or imaging would be the next best step.    Refilled atorvastatin, but if LDL is <100 without statin OK to stop.     COUNSELING:  Reviewed preventive health counseling, as reflected in patient instructions    Estimated body mass index is 25.69 kg/m  as calculated from the following:    Height as of this encounter: 1.765 m (5' " "9.5\").    Weight as of this encounter: 80.1 kg (176 lb 8 oz).        He reports that he quit smoking about 24 years ago. His smoking use included cigarettes. He started smoking about 64 years ago. He has a 10.00 pack-year smoking history. He has never used smokeless tobacco.      Appropriate preventive services were discussed with this patient, including applicable screening as appropriate for cardiovascular disease, diabetes, osteopenia/osteoporosis, and glaucoma.  As appropriate for age/gender, discussed screening for colorectal cancer, prostate cancer. Checklist reviewing preventive services available has been given to the patient.    Reviewed patients plan of care and provided an AVS. The Basic Care Plan (routine screening as documented in Health Maintenance) for Cy meets the Care Plan requirement. This Care Plan has been established and reviewed with the Patient.    Counseling Resources:  ATP IV Guidelines  Pooled Cohorts Equation Calculator  Breast Cancer Risk Calculator  Breast Cancer: Medication to Reduce Risk  FRAX Risk Assessment  ICSI Preventive Guidelines  Dietary Guidelines for Americans, 2010  [employer]'s MyPlate  ASA Prophylaxis  Lung CA Screening    Shayan Dixon MD  [facility]    Identified Health Risks:  "

## 2022-05-18 LAB
ALBUMIN SERPL-MCNC: 3.6 G/DL (ref 3.4–5)
ALP SERPL-CCNC: 46 U/L (ref 40–150)
ALT SERPL W P-5'-P-CCNC: 29 U/L (ref 0–70)
ANION GAP SERPL CALCULATED.3IONS-SCNC: 8 MMOL/L (ref 3–14)
AST SERPL W P-5'-P-CCNC: 26 U/L (ref 0–45)
BILIRUB SERPL-MCNC: 1.9 MG/DL (ref 0.2–1.3)
BUN SERPL-MCNC: 17 MG/DL (ref 7–30)
CALCIUM SERPL-MCNC: 9.4 MG/DL (ref 8.5–10.1)
CHLORIDE BLD-SCNC: 109 MMOL/L (ref 94–109)
CHOLEST SERPL-MCNC: 206 MG/DL
CO2 SERPL-SCNC: 23 MMOL/L (ref 20–32)
CREAT SERPL-MCNC: 0.87 MG/DL (ref 0.66–1.25)
FASTING STATUS PATIENT QL REPORTED: YES
GFR SERPL CREATININE-BSD FRML MDRD: 85 ML/MIN/1.73M2
GLUCOSE BLD-MCNC: 85 MG/DL (ref 70–99)
HDLC SERPL-MCNC: 66 MG/DL
LDLC SERPL CALC-MCNC: 130 MG/DL
NONHDLC SERPL-MCNC: 140 MG/DL
POTASSIUM BLD-SCNC: 4.4 MMOL/L (ref 3.4–5.3)
PROT SERPL-MCNC: 6.9 G/DL (ref 6.8–8.8)
PSA SERPL-MCNC: 1.76 UG/L (ref 0–4)
SODIUM SERPL-SCNC: 140 MMOL/L (ref 133–144)
TRIGL SERPL-MCNC: 52 MG/DL

## 2022-10-22 ENCOUNTER — HEALTH MAINTENANCE LETTER (OUTPATIENT)
Age: 82
End: 2022-10-22

## 2023-04-20 ENCOUNTER — PATIENT OUTREACH (OUTPATIENT)
Dept: CARE COORDINATION | Facility: CLINIC | Age: 83
End: 2023-04-20
Payer: COMMERCIAL

## 2023-05-04 ENCOUNTER — PATIENT OUTREACH (OUTPATIENT)
Dept: CARE COORDINATION | Facility: CLINIC | Age: 83
End: 2023-05-04
Payer: COMMERCIAL

## 2023-06-18 ENCOUNTER — HEALTH MAINTENANCE LETTER (OUTPATIENT)
Age: 83
End: 2023-06-18

## 2023-08-10 DIAGNOSIS — E78.5 HYPERLIPIDEMIA LDL GOAL <100: ICD-10-CM

## 2023-08-11 RX ORDER — ATORVASTATIN CALCIUM 10 MG/1
10 TABLET, FILM COATED ORAL DAILY
Qty: 90 TABLET | Refills: 4 | Status: SHIPPED | OUTPATIENT
Start: 2023-08-11 | End: 2024-03-03

## 2023-08-11 NOTE — TELEPHONE ENCOUNTER
Routing refill request to provider for review/approval because:  Labs out of range:  LDL  LDL Cholesterol Calculated   Date Value Ref Range Status   05/16/2022 130 (H) <=100 mg/dL Final   04/14/2021 67 <100 mg/dL Final     Comment:     Desirable:       <100 mg/dl       Amairani LIND RN

## 2023-08-30 SDOH — ECONOMIC STABILITY: FOOD INSECURITY: WITHIN THE PAST 12 MONTHS, YOU WORRIED THAT YOUR FOOD WOULD RUN OUT BEFORE YOU GOT MONEY TO BUY MORE.: NEVER TRUE

## 2023-08-30 SDOH — ECONOMIC STABILITY: INCOME INSECURITY: IN THE LAST 12 MONTHS, WAS THERE A TIME WHEN YOU WERE NOT ABLE TO PAY THE MORTGAGE OR RENT ON TIME?: NO

## 2023-08-30 SDOH — HEALTH STABILITY: PHYSICAL HEALTH: ON AVERAGE, HOW MANY DAYS PER WEEK DO YOU ENGAGE IN MODERATE TO STRENUOUS EXERCISE (LIKE A BRISK WALK)?: 3 DAYS

## 2023-08-30 SDOH — HEALTH STABILITY: PHYSICAL HEALTH: ON AVERAGE, HOW MANY MINUTES DO YOU ENGAGE IN EXERCISE AT THIS LEVEL?: 40 MIN

## 2023-08-30 SDOH — ECONOMIC STABILITY: FOOD INSECURITY: WITHIN THE PAST 12 MONTHS, THE FOOD YOU BOUGHT JUST DIDN'T LAST AND YOU DIDN'T HAVE MONEY TO GET MORE.: NEVER TRUE

## 2023-08-30 SDOH — ECONOMIC STABILITY: INCOME INSECURITY: HOW HARD IS IT FOR YOU TO PAY FOR THE VERY BASICS LIKE FOOD, HOUSING, MEDICAL CARE, AND HEATING?: SOMEWHAT HARD

## 2023-08-30 ASSESSMENT — ENCOUNTER SYMPTOMS
HEARTBURN: 0
HEMATURIA: 0
NAUSEA: 0
CONSTIPATION: 0
HEMATOCHEZIA: 0
WEAKNESS: 0
CHILLS: 0
FEVER: 0
COUGH: 0
DIZZINESS: 0
DYSURIA: 0
ARTHRALGIAS: 1
MYALGIAS: 0
SHORTNESS OF BREATH: 0
NERVOUS/ANXIOUS: 0
HEADACHES: 0
PALPITATIONS: 0
SORE THROAT: 0
DIARRHEA: 0
FREQUENCY: 0
PARESTHESIAS: 0
EYE PAIN: 0
ABDOMINAL PAIN: 0

## 2023-08-30 ASSESSMENT — LIFESTYLE VARIABLES
HOW OFTEN DO YOU HAVE A DRINK CONTAINING ALCOHOL: 4 OR MORE TIMES A WEEK
HOW OFTEN DO YOU HAVE SIX OR MORE DRINKS ON ONE OCCASION: NEVER
SKIP TO QUESTIONS 9-10: 1
HOW MANY STANDARD DRINKS CONTAINING ALCOHOL DO YOU HAVE ON A TYPICAL DAY: 1 OR 2
AUDIT-C TOTAL SCORE: 4

## 2023-08-30 ASSESSMENT — SOCIAL DETERMINANTS OF HEALTH (SDOH)
DO YOU BELONG TO ANY CLUBS OR ORGANIZATIONS SUCH AS CHURCH GROUPS UNIONS, FRATERNAL OR ATHLETIC GROUPS, OR SCHOOL GROUPS?: YES
IN A TYPICAL WEEK, HOW MANY TIMES DO YOU TALK ON THE PHONE WITH FAMILY, FRIENDS, OR NEIGHBORS?: MORE THAN THREE TIMES A WEEK
HOW OFTEN DO YOU GET TOGETHER WITH FRIENDS OR RELATIVES?: TWICE A WEEK
HOW OFTEN DO YOU ATTEND CHURCH OR RELIGIOUS SERVICES?: MORE THAN 4 TIMES PER YEAR

## 2023-08-30 ASSESSMENT — ACTIVITIES OF DAILY LIVING (ADL): CURRENT_FUNCTION: NO ASSISTANCE NEEDED

## 2023-09-05 ENCOUNTER — OFFICE VISIT (OUTPATIENT)
Dept: PEDIATRICS | Facility: CLINIC | Age: 83
End: 2023-09-05
Payer: COMMERCIAL

## 2023-09-05 VITALS
WEIGHT: 172 LBS | SYSTOLIC BLOOD PRESSURE: 110 MMHG | RESPIRATION RATE: 16 BRPM | BODY MASS INDEX: 24.08 KG/M2 | HEART RATE: 60 BPM | OXYGEN SATURATION: 97 % | HEIGHT: 71 IN | DIASTOLIC BLOOD PRESSURE: 58 MMHG | TEMPERATURE: 97.7 F

## 2023-09-05 DIAGNOSIS — Z85.820 HISTORY OF MELANOMA: ICD-10-CM

## 2023-09-05 DIAGNOSIS — Z00.00 ROUTINE GENERAL MEDICAL EXAMINATION AT A HEALTH CARE FACILITY: Primary | ICD-10-CM

## 2023-09-05 DIAGNOSIS — E78.5 HYPERLIPIDEMIA LDL GOAL <100: ICD-10-CM

## 2023-09-05 LAB
ALBUMIN SERPL BCG-MCNC: 4 G/DL (ref 3.5–5.2)
ALP SERPL-CCNC: 43 U/L (ref 40–129)
ALT SERPL W P-5'-P-CCNC: 21 U/L (ref 0–70)
ANION GAP SERPL CALCULATED.3IONS-SCNC: 11 MMOL/L (ref 7–15)
AST SERPL W P-5'-P-CCNC: 27 U/L (ref 0–45)
BILIRUB SERPL-MCNC: 1.6 MG/DL
BUN SERPL-MCNC: 16.4 MG/DL (ref 8–23)
CALCIUM SERPL-MCNC: 9.5 MG/DL (ref 8.8–10.2)
CHLORIDE SERPL-SCNC: 108 MMOL/L (ref 98–107)
CHOLEST SERPL-MCNC: 157 MG/DL
CREAT SERPL-MCNC: 1 MG/DL (ref 0.67–1.17)
DEPRECATED HCO3 PLAS-SCNC: 22 MMOL/L (ref 22–29)
GFR SERPL CREATININE-BSD FRML MDRD: 74 ML/MIN/1.73M2
GLUCOSE SERPL-MCNC: 98 MG/DL (ref 70–99)
HDLC SERPL-MCNC: 67 MG/DL
LDLC SERPL CALC-MCNC: 79 MG/DL
NONHDLC SERPL-MCNC: 90 MG/DL
POTASSIUM SERPL-SCNC: 4.1 MMOL/L (ref 3.4–5.3)
PROT SERPL-MCNC: 6.4 G/DL (ref 6.4–8.3)
SODIUM SERPL-SCNC: 141 MMOL/L (ref 136–145)
TRIGL SERPL-MCNC: 55 MG/DL

## 2023-09-05 PROCEDURE — G0439 PPPS, SUBSEQ VISIT: HCPCS | Performed by: INTERNAL MEDICINE

## 2023-09-05 PROCEDURE — 80053 COMPREHEN METABOLIC PANEL: CPT | Performed by: INTERNAL MEDICINE

## 2023-09-05 PROCEDURE — 36415 COLL VENOUS BLD VENIPUNCTURE: CPT | Performed by: INTERNAL MEDICINE

## 2023-09-05 PROCEDURE — 80061 LIPID PANEL: CPT | Performed by: INTERNAL MEDICINE

## 2023-09-05 RX ORDER — GABAPENTIN 100 MG/1
CAPSULE ORAL
COMMUNITY
End: 2024-03-03

## 2023-09-05 ASSESSMENT — PAIN SCALES - GENERAL: PAINLEVEL: NO PAIN (0)

## 2023-09-05 ASSESSMENT — ENCOUNTER SYMPTOMS
WEAKNESS: 0
MYALGIAS: 0
NERVOUS/ANXIOUS: 0
HEMATURIA: 0
EYE PAIN: 0
HEMATOCHEZIA: 0
PARESTHESIAS: 0
CHILLS: 0
ARTHRALGIAS: 1
SORE THROAT: 0
DIZZINESS: 0
SHORTNESS OF BREATH: 0
HEADACHES: 0
COUGH: 0
HEARTBURN: 0
ABDOMINAL PAIN: 0
CONSTIPATION: 0
FREQUENCY: 0
FEVER: 0
NAUSEA: 0
DIARRHEA: 0
PALPITATIONS: 0
DYSURIA: 0

## 2023-09-05 ASSESSMENT — ACTIVITIES OF DAILY LIVING (ADL): CURRENT_FUNCTION: NO ASSISTANCE NEEDED

## 2023-09-05 NOTE — PATIENT INSTRUCTIONS
Patient Education   Personalized Prevention Plan  You are due for the preventive services outlined below.  Your care team is available to assist you in scheduling these services.  If you have already completed any of these items, please share that information with your care team to update in your medical record.  Health Maintenance Due   Topic Date Due     ANNUAL REVIEW OF  ORDERS  Never done     Zoster (Shingles) Vaccine (2 of 3) 04/02/2011     Annual Wellness Visit  05/16/2023     Flu Vaccine (1) 09/01/2023     Hearing Loss: Care Instructions  Overview     Hearing loss is a sudden or slow decrease in how well you hear. It can range from slight to profound. Permanent hearing loss can occur with aging. It also can happen when you are exposed long-term to loud noise. Examples include listening to loud music, riding motorcycles, or being around other loud machines.  Hearing loss can affect your work and home life. It can make you feel lonely or depressed. You may feel that you have lost your independence. But hearing aids and other devices can help you hear better and feel connected to others.  Follow-up care is a key part of your treatment and safety. Be sure to make and go to all appointments, and call your doctor if you are having problems. It's also a good idea to know your test results and keep a list of the medicines you take.  How can you care for yourself at home?  Avoid loud noises whenever possible. This helps keep your hearing from getting worse.  Always wear hearing protection around loud noises.  Wear a hearing aid as directed.  A professional can help you pick a hearing aid that will work best for you.  You can also get hearing aids over the counter for mild to moderate hearing loss.  Have hearing tests as your doctor suggests. They can show whether your hearing has changed. Your hearing aid may need to be adjusted.  Use other devices as needed. These may include:  Telephone amplifiers and hearing aids  "that can connect to a television, stereo, radio, or microphone.  Devices that use lights or vibrations. These alert you to the doorbell, a ringing telephone, or a baby monitor.  Television closed-captioning. This shows the words at the bottom of the screen. Most new TVs can do this.  TTY (text telephone). This lets you type messages back and forth on the telephone instead of talking or listening. These devices are also called TDD. When messages are typed on the keyboard, they are sent over the phone line to a receiving TTY. The message is shown on a monitor.  Use text messaging, social media, and email if it is hard for you to communicate by telephone.  Try to learn a listening technique called speechreading. It is not lipreading. You pay attention to people's gestures, expressions, posture, and tone of voice. These clues can help you understand what a person is saying. Face the person you are talking to, and have them face you. Make sure the lighting is good. You need to see the other person's face clearly.  Think about counseling if you need help to adjust to your hearing loss.  When should you call for help?  Watch closely for changes in your health, and be sure to contact your doctor if:    You think your hearing is getting worse.     You have new symptoms, such as dizziness or nausea.   Where can you learn more?  Go to https://www.[employer].net/patiented  Enter R798 in the search box to learn more about \"Hearing Loss: Care Instructions.\"  Current as of: March 1, 2023               Content Version: 13.7    0788-7071 [employer].   Care instructions adapted under license by your healthcare professional. If you have questions about a medical condition or this instruction, always ask your healthcare professional. [employer] disclaims any warranty or liability for your use of this information.         "

## 2023-09-05 NOTE — PROGRESS NOTES
"SUBJECTIVE:   Cleveland is a 83 year old who presents for Preventive Visit.      9/5/2023     8:16 AM   Additional Questions   Roomed by Sheela         9/5/2023     8:16 AM   Patient Reported Additional Medications   Patient reports taking the following new medications None       Are you in the first 12 months of your Medicare coverage?  No    Healthy Habits:     In general, how would you rate your overall health?  Good    Frequency of exercise:  2-3 days/week    Duration of exercise:  15-30 minutes    Do you usually eat at least 4 servings of fruit and vegetables a day, include whole grains    & fiber and avoid regularly eating high fat or \"junk\" foods?  Yes    Taking medications regularly:  Yes    Barriers to taking medications:  None    Medication side effects:  Not applicable    Ability to successfully perform activities of daily living:  No assistance needed    Home Safety:  No safety concerns identified    Hearing Impairment:  Difficulty following a conversation in a noisy restaurant or crowded room and difficult to understand a speaker at a public meeting or [employer] service    In the past 6 months, have you been bothered by leaking of urine?  No    In general, how would you rate your overall mental or emotional health?  Good    Additional concerns today:  No    Here for CPE. Overall is feeling well.  Ongoing lower back pains. Being managed outside of this clinic.   Hyperlipidemia. Tolerating meds.   Lab Results   Component Value Date     05/16/2022    LDL 67 04/14/2021    LDL 82 11/11/2019     Hx of melanoma. Has not seen dermatology in a few years. Recommend updating full derm evaluation.         Have you ever done Advance Care Planning? (For example, a Health Directive, POLST, or a discussion with a medical provider or your loved ones about your wishes): No, advance care planning information given to patient to review.  Patient plans to discuss their wishes with loved ones or provider.         Fall " risk  Fallen 2 or more times in the past year?: No  Any fall with injury in the past year?: No    Cognitive Screening   1) Repeat 3 items (Leader, Season, Table)    2) Clock draw: NORMAL  3) 3 item recall: Recalls 3 objects  Results: 3 items recalled: COGNITIVE IMPAIRMENT LESS LIKELY    Mini-CogTM Copyright PAMELLA Estevez. Licensed by the author for use in [facility]; reprinted with permission (kadi@[facility]). All rights reserved.      Do you have sleep apnea, excessive snoring or daytime drowsiness? : yes Wife tells him he snores and that he stops breathing when sleeping, but never had a sleep study     Social History     Tobacco Use     Smoking status: Former     Packs/day: 0.25     Years: 40.00     Pack years: 10.00     Types: Cigarettes     Start date: 1958     Quit date: 1998     Years since quittin.5     Smokeless tobacco: Never   Substance Use Topics     Alcohol use: Yes     Comment: average 1 beer/wine 4x's a week           2023     9:47 AM   Alcohol Use   Prescreen: >3 drinks/day or >7 drinks/week? No     Do you have a current opioid prescription? No  Do you use any other controlled substances or medications that are not prescribed by a provider? None      Current providers sharing in care for this patient include:   Patient Care Team:  Shayan Dixon MD as PCP - General (Internal Medicine)  Shayan Dixon MD as Assigned PCP    The following health maintenance items are reviewed in Epic and correct as of today:  Health Maintenance   Topic Date Due     ANNUAL REVIEW OF  ORDERS  Never done     ZOSTER IMMUNIZATION (2 of 3) 2011     DTAP/TDAP/TD IMMUNIZATION (1 - Tdap) 2015     MEDICARE ANNUAL WELLNESS VISIT  2023     INFLUENZA VACCINE (1) 2023     FALL RISK ASSESSMENT  2024     ADVANCE CARE PLANNING  2028     PHQ-2 (once per calendar year)  Completed     Pneumococcal Vaccine: 65+ Years  Completed     COVID-19 Vaccine  Completed     IPV  "IMMUNIZATION  Aged Out     HPV IMMUNIZATION  Aged Out     MENINGITIS IMMUNIZATION  Aged Out       Review of Systems   Constitutional:  Negative for chills and fever.   HENT:  Negative for congestion, ear pain, hearing loss and sore throat.    Eyes:  Negative for pain and visual disturbance.   Respiratory:  Negative for cough and shortness of breath.    Cardiovascular:  Negative for chest pain and palpitations.   Gastrointestinal:  Negative for abdominal pain, constipation, diarrhea, heartburn, hematochezia and nausea.   Genitourinary:  Negative for dysuria, frequency, genital sores, hematuria, impotence, penile discharge and urgency.   Musculoskeletal:  Positive for arthralgias. Negative for myalgias.   Skin:  Negative for rash.   Neurological:  Negative for dizziness, weakness, headaches and paresthesias.   Psychiatric/Behavioral:  Negative for mood changes. The patient is not nervous/anxious.        OBJECTIVE:   /58   Pulse 60   Temp 97.7  F (36.5  C)   Resp 16   Ht 1.791 m (5' 10.5\")   Wt 78 kg (172 lb)   SpO2 97%   BMI 24.33 kg/m   Estimated body mass index is 24.33 kg/m  as calculated from the following:    Height as of this encounter: 1.791 m (5' 10.5\").    Weight as of this encounter: 78 kg (172 lb).  Physical Exam  GENERAL: healthy, alert and no distress  EYES: PERRL, EOMI  HENT: ear canals and TM's normal. No nasal discharge. OP moist.  NECK: no adenopathy  RESP: lungs clear to auscultation - no rales, rhonchi or wheezes  CV: regular rate and rhythm, normal S1 S2, no murmur, no peripheral edema and peripheral pulses strong  ABDOMEN: soft, nontender, bowel sounds normal  MS: no gross musculoskeletal defects noted  SKIN: no suspicious lesions or rashes  NEURO: Normal strength and tone  PSYCH: mentation appears normal, affect normal/bright       ASSESSMENT / PLAN:       ICD-10-CM    1. Routine general medical examination at a health care facility  Z00.00 Lipid panel reflex to direct LDL Fasting    "  Comprehensive metabolic panel     Lipid panel reflex to direct LDL Fasting     Comprehensive metabolic panel      2. Hyperlipidemia LDL goal <100  E78.5 Lipid panel reflex to direct LDL Fasting     Comprehensive metabolic panel     Lipid panel reflex to direct LDL Fasting     Comprehensive metabolic panel      3. History of melanoma  Z85.820 Adult Dermatology Referral        Overall doing well.  No med changes made today.  Update fasting labwork.  Derm referral signed.     COUNSELING:  Reviewed preventive health counseling, as reflected in patient instructions        He reports that he quit smoking about 25 years ago. His smoking use included cigarettes. He started smoking about 65 years ago. He has a 10.00 pack-year smoking history. He has never used smokeless tobacco.      Appropriate preventive services were discussed with this patient, including applicable screening as appropriate for cardiovascular disease, diabetes, osteopenia/osteoporosis, and glaucoma.  As appropriate for age/gender, discussed screening for colorectal cancer, prostate cancer, breast cancer, and cervical cancer. Checklist reviewing preventive services available has been given to the patient.    Reviewed patients plan of care and provided an AVS. The Basic Care Plan (routine screening as documented in Health Maintenance) for Cy meets the Care Plan requirement. This Care Plan has been established and reviewed with the Patient.      Shayan Dixon MD  [facility]  The patient was provided with written information regarding signs of hearing loss.

## 2024-02-25 ENCOUNTER — HOSPITAL ENCOUNTER (OUTPATIENT)
Dept: MRI IMAGING | Facility: CLINIC | Age: 84
Discharge: HOME | End: 2024-02-25
Attending: INTERNAL MEDICINE | Admitting: INTERNAL MEDICINE
Payer: COMMERCIAL

## 2024-02-25 DIAGNOSIS — M25.551 HIP PAIN, RIGHT: ICD-10-CM

## 2024-02-25 PROCEDURE — 73721 MRI JNT OF LWR EXTRE W/O DYE: CPT | Mod: RT

## 2024-03-03 ENCOUNTER — HOSPITAL ENCOUNTER (OUTPATIENT)
Facility: CLINIC | Age: 84
Setting detail: OBSERVATION
Discharge: HOME | End: 2024-03-04
Attending: PHYSICIAN ASSISTANT | Admitting: INTERNAL MEDICINE
Payer: COMMERCIAL

## 2024-03-03 ENCOUNTER — APPOINTMENT (OUTPATIENT)
Dept: GENERAL RADIOLOGY | Facility: CLINIC | Age: 84
End: 2024-03-03
Attending: PHYSICIAN ASSISTANT
Payer: COMMERCIAL

## 2024-03-03 ENCOUNTER — NURSE TRIAGE (OUTPATIENT)
Dept: NURSING | Facility: CLINIC | Age: 84
End: 2024-03-03
Payer: COMMERCIAL

## 2024-03-03 DIAGNOSIS — R00.0 RACING HEART BEAT: ICD-10-CM

## 2024-03-03 DIAGNOSIS — R79.89 ELEVATED TROPONIN I LEVEL: ICD-10-CM

## 2024-03-03 LAB
ANION GAP SERPL CALCULATED.3IONS-SCNC: 10 MMOL/L (ref 7–15)
BASOPHILS # BLD AUTO: 0 10E3/UL (ref 0–0.2)
BASOPHILS NFR BLD AUTO: 0 %
BUN SERPL-MCNC: 26.1 MG/DL (ref 8–23)
CALCIUM SERPL-MCNC: 9.5 MG/DL (ref 8.8–10.2)
CHLORIDE SERPL-SCNC: 101 MMOL/L (ref 98–107)
CREAT SERPL-MCNC: 0.91 MG/DL (ref 0.67–1.17)
D DIMER PPP FEU-MCNC: 0.46 UG/ML FEU (ref 0–0.5)
DEPRECATED HCO3 PLAS-SCNC: 24 MMOL/L (ref 22–29)
EGFRCR SERPLBLD CKD-EPI 2021: 83 ML/MIN/1.73M2
EOSINOPHIL # BLD AUTO: 0 10E3/UL (ref 0–0.7)
EOSINOPHIL NFR BLD AUTO: 0 %
ERYTHROCYTE [DISTWIDTH] IN BLOOD BY AUTOMATED COUNT: 13.2 % (ref 10–15)
GLUCOSE SERPL-MCNC: 120 MG/DL (ref 70–99)
HCT VFR BLD AUTO: 43.9 % (ref 40–53)
HGB BLD-MCNC: 14.7 G/DL (ref 13.3–17.7)
HOLD SPECIMEN: NORMAL
IMM GRANULOCYTES # BLD: 0.1 10E3/UL
IMM GRANULOCYTES NFR BLD: 1 %
LYMPHOCYTES # BLD AUTO: 1.3 10E3/UL (ref 0.8–5.3)
LYMPHOCYTES NFR BLD AUTO: 11 %
MCH RBC QN AUTO: 31.7 PG (ref 26.5–33)
MCHC RBC AUTO-ENTMCNC: 33.5 G/DL (ref 31.5–36.5)
MCV RBC AUTO: 95 FL (ref 78–100)
MONOCYTES # BLD AUTO: 1.2 10E3/UL (ref 0–1.3)
MONOCYTES NFR BLD AUTO: 10 %
NEUTROPHILS # BLD AUTO: 9.5 10E3/UL (ref 1.6–8.3)
NEUTROPHILS NFR BLD AUTO: 78 %
NRBC # BLD AUTO: 0 10E3/UL
NRBC BLD AUTO-RTO: 0 /100
PLATELET # BLD AUTO: 209 10E3/UL (ref 150–450)
POTASSIUM SERPL-SCNC: 4.4 MMOL/L (ref 3.4–5.3)
RBC # BLD AUTO: 4.63 10E6/UL (ref 4.4–5.9)
SODIUM SERPL-SCNC: 135 MMOL/L (ref 135–145)
TROPONIN T SERPL HS-MCNC: 31 NG/L
TROPONIN T SERPL HS-MCNC: 34 NG/L
TSH SERPL DL<=0.005 MIU/L-ACNC: 3.3 UIU/ML (ref 0.3–4.2)
WBC # BLD AUTO: 12.2 10E3/UL (ref 4–11)

## 2024-03-03 PROCEDURE — G0378 HOSPITAL OBSERVATION PER HR: HCPCS

## 2024-03-03 PROCEDURE — 71046 X-RAY EXAM CHEST 2 VIEWS: CPT

## 2024-03-03 PROCEDURE — 93005 ELECTROCARDIOGRAM TRACING: CPT

## 2024-03-03 PROCEDURE — 84443 ASSAY THYROID STIM HORMONE: CPT | Performed by: PHYSICIAN ASSISTANT

## 2024-03-03 PROCEDURE — 84484 ASSAY OF TROPONIN QUANT: CPT | Performed by: PHYSICIAN ASSISTANT

## 2024-03-03 PROCEDURE — 36415 COLL VENOUS BLD VENIPUNCTURE: CPT | Performed by: PHYSICIAN ASSISTANT

## 2024-03-03 PROCEDURE — 250N000013 HC RX MED GY IP 250 OP 250 PS 637: Performed by: PHYSICIAN ASSISTANT

## 2024-03-03 PROCEDURE — 85379 FIBRIN DEGRADATION QUANT: CPT | Performed by: PHYSICIAN ASSISTANT

## 2024-03-03 PROCEDURE — 85025 COMPLETE CBC W/AUTO DIFF WBC: CPT | Performed by: PHYSICIAN ASSISTANT

## 2024-03-03 PROCEDURE — 99285 EMERGENCY DEPT VISIT HI MDM: CPT | Mod: 25

## 2024-03-03 PROCEDURE — 250N000013 HC RX MED GY IP 250 OP 250 PS 637: Performed by: INTERNAL MEDICINE

## 2024-03-03 PROCEDURE — 80048 BASIC METABOLIC PNL TOTAL CA: CPT | Performed by: PHYSICIAN ASSISTANT

## 2024-03-03 PROCEDURE — 99222 1ST HOSP IP/OBS MODERATE 55: CPT | Performed by: INTERNAL MEDICINE

## 2024-03-03 RX ORDER — AMOXICILLIN 250 MG
2 CAPSULE ORAL 2 TIMES DAILY PRN
Status: DISCONTINUED | OUTPATIENT
Start: 2024-03-03 | End: 2024-03-04 | Stop reason: HOSPADM

## 2024-03-03 RX ORDER — ACETAMINOPHEN 325 MG/1
650 TABLET ORAL EVERY 4 HOURS PRN
Status: DISCONTINUED | OUTPATIENT
Start: 2024-03-03 | End: 2024-03-04 | Stop reason: HOSPADM

## 2024-03-03 RX ORDER — ATORVASTATIN CALCIUM 20 MG/1
20 TABLET, FILM COATED ORAL DAILY
COMMUNITY
Start: 2024-01-05 | End: 2024-10-07

## 2024-03-03 RX ORDER — GABAPENTIN 100 MG/1
200 CAPSULE ORAL EVERY EVENING
COMMUNITY
End: 2024-06-04

## 2024-03-03 RX ORDER — ONDANSETRON 4 MG/1
4 TABLET, ORALLY DISINTEGRATING ORAL EVERY 6 HOURS PRN
Status: DISCONTINUED | OUTPATIENT
Start: 2024-03-03 | End: 2024-03-04 | Stop reason: HOSPADM

## 2024-03-03 RX ORDER — ASPIRIN 325 MG
325 TABLET ORAL ONCE
Status: COMPLETED | OUTPATIENT
Start: 2024-03-03 | End: 2024-03-03

## 2024-03-03 RX ORDER — GABAPENTIN 100 MG/1
200 CAPSULE ORAL EVERY EVENING
Status: DISCONTINUED | OUTPATIENT
Start: 2024-03-03 | End: 2024-03-03

## 2024-03-03 RX ORDER — ONDANSETRON 2 MG/ML
4 INJECTION INTRAMUSCULAR; INTRAVENOUS EVERY 6 HOURS PRN
Status: DISCONTINUED | OUTPATIENT
Start: 2024-03-03 | End: 2024-03-04 | Stop reason: HOSPADM

## 2024-03-03 RX ORDER — ASPIRIN 81 MG/1
81 TABLET ORAL DAILY
Status: DISCONTINUED | OUTPATIENT
Start: 2024-03-04 | End: 2024-03-04 | Stop reason: HOSPADM

## 2024-03-03 RX ORDER — ACETAMINOPHEN 650 MG/1
650 SUPPOSITORY RECTAL EVERY 4 HOURS PRN
Status: DISCONTINUED | OUTPATIENT
Start: 2024-03-03 | End: 2024-03-04 | Stop reason: HOSPADM

## 2024-03-03 RX ORDER — NITROGLYCERIN 0.4 MG/1
0.4 TABLET SUBLINGUAL EVERY 5 MIN PRN
Status: DISCONTINUED | OUTPATIENT
Start: 2024-03-03 | End: 2024-03-04 | Stop reason: HOSPADM

## 2024-03-03 RX ORDER — GABAPENTIN 100 MG/1
200 CAPSULE ORAL
Status: DISCONTINUED | OUTPATIENT
Start: 2024-03-03 | End: 2024-03-04 | Stop reason: HOSPADM

## 2024-03-03 RX ORDER — AMOXICILLIN 250 MG
1 CAPSULE ORAL 2 TIMES DAILY PRN
Status: DISCONTINUED | OUTPATIENT
Start: 2024-03-03 | End: 2024-03-04 | Stop reason: HOSPADM

## 2024-03-03 RX ORDER — MAGNESIUM HYDROXIDE/ALUMINUM HYDROXICE/SIMETHICONE 120; 1200; 1200 MG/30ML; MG/30ML; MG/30ML
30 SUSPENSION ORAL EVERY 4 HOURS PRN
Status: DISCONTINUED | OUTPATIENT
Start: 2024-03-03 | End: 2024-03-04 | Stop reason: HOSPADM

## 2024-03-03 RX ADMIN — GABAPENTIN 200 MG: 100 CAPSULE ORAL at 16:27

## 2024-03-03 RX ADMIN — ASPIRIN 325 MG ORAL TABLET 325 MG: 325 PILL ORAL at 10:26

## 2024-03-03 ASSESSMENT — ACTIVITIES OF DAILY LIVING (ADL)
ADLS_ACUITY_SCORE: 21
ADLS_ACUITY_SCORE: 35
ADLS_ACUITY_SCORE: 21
ADLS_ACUITY_SCORE: 35
ADLS_ACUITY_SCORE: 21
ADLS_ACUITY_SCORE: 35
ADLS_ACUITY_SCORE: 35

## 2024-03-03 ASSESSMENT — COLUMBIA-SUICIDE SEVERITY RATING SCALE - C-SSRS
2. HAVE YOU ACTUALLY HAD ANY THOUGHTS OF KILLING YOURSELF IN THE PAST MONTH?: NO
6. HAVE YOU EVER DONE ANYTHING, STARTED TO DO ANYTHING, OR PREPARED TO DO ANYTHING TO END YOUR LIFE?: NO
1. IN THE PAST MONTH, HAVE YOU WISHED YOU WERE DEAD OR WISHED YOU COULD GO TO SLEEP AND NOT WAKE UP?: NO

## 2024-03-03 NOTE — ED PROVIDER NOTES
History     Chief Complaint:  Palpitations       HPI   Cleveland Long is a  83-year-old male who presents with concerns regarding rapid heartbeat that he has been experiencing over the last couple of weeks.  He had a bout that woke him up this morning he felt like his heart was racing in the 130s.  He googled his symptoms and he blew like he was blowing into a straw and that resolved his rapid heart rate.  He had cardiology appointment about a month and a half ago and had a Zio patch on for skipped beats and palpitations which turned out negative and he was cleared by his cardiologist.  He did not have any chest pain with the rapid heartbeat or difficulty breathing he has had no fevers.  At this time he is completely asymptomatic.  No history of blood clots or heart attacks.  Patient did report on Friday he got an epidural for issues with back pain but again has had no fevers or increasing pain.    Independent Historian:        Review of External Notes:  Cardiology note  12/5/24    Medications:    No current outpatient medications on file.      Past Medical History:    Past Medical History:   Diagnosis Date     Arthritis 1990     Cancer (H) 2007     Diverticulitis of colon 01/01/2011     Heart disease 2007       Past Surgical History:    Past Surgical History:   Procedure Laterality Date     APPENDECTOMY       CATARACT IOL, RT/LT      bilateral     COLONOSCOPY  2014    Last exam     GENITOURINARY SURGERY      repair of testicular hematoma     HERNIA REPAIR      bilateral inguinal          Physical Exam   Patient Vitals for the past 24 hrs:   BP Temp Temp src Pulse Resp SpO2 Height Weight   03/03/24 1907 112/57 97.6  F (36.4  C) Oral 50 16 95 % -- --   03/03/24 1535 103/53 97.3  F (36.3  C) Oral 53 15 95 % 1.829 m (6') 75.1 kg (165 lb 8 oz)   03/03/24 1448 128/67 -- -- 56 15 98 % -- --   03/03/24 1435 119/70 -- -- 54 (!) 9 97 % -- --   03/03/24 1411 133/61 -- -- 53 -- 99 % -- --   03/03/24 1356 126/63 -- -- 54 11  97 % -- --   03/03/24 1345 117/76 -- -- 53 10 97 % -- --   03/03/24 1328 126/74 -- -- 55 15 97 % -- --   03/03/24 1315 114/72 -- -- 53 (!) 8 96 % -- --   03/03/24 1305 119/67 -- -- 54 25 96 % -- --   03/03/24 1250 122/67 -- -- 50 10 96 % -- --   03/03/24 1235 125/64 -- -- 53 10 96 % -- --   03/03/24 1215 125/68 -- -- 51 16 94 % -- --   03/03/24 1205 121/75 -- -- 52 11 97 % -- --   03/03/24 1147 118/78 -- -- 56 14 97 % -- --   03/03/24 1127 119/70 -- -- 53 13 95 % -- --   03/03/24 1118 -- -- -- 55 18 98 % -- --   03/03/24 1113 108/50 -- -- 54 -- -- -- --   03/03/24 1047 108/61 -- -- 53 11 97 % -- --   03/03/24 1030 111/65 -- -- 55 16 95 % -- --   03/03/24 1020 115/65 -- -- 55 18 94 % -- --   03/03/24 1000 106/63 -- -- 57 16 94 % -- --   03/03/24 0947 113/68 -- -- 60 13 93 % -- --   03/03/24 0937 109/62 -- -- 58 11 98 % -- --   03/03/24 0914 131/79 (!) 96.2  F (35.7  C) Temporal 64 18 99 % 1.829 m (6') 76.9 kg (169 lb 8.5 oz)        Physical Exam  Vitals and nursing note reviewed.   Constitutional:       Appearance: He is not diaphoretic.   HENT:      Nose: Nose normal.      Mouth/Throat:      Mouth: Mucous membranes are moist.      Pharynx: Oropharynx is clear.   Eyes:      General: No scleral icterus.     Conjunctiva/sclera: Conjunctivae normal.      Pupils: Pupils are equal, round, and reactive to light.   Cardiovascular:      Rate and Rhythm: Normal rate and regular rhythm.      Pulses: Normal pulses.           Radial pulses are 2+ on the right side and 2+ on the left side.      Heart sounds: Normal heart sounds. No murmur heard.     No friction rub. No gallop.   Pulmonary:      Effort: No respiratory distress.      Breath sounds: Normal breath sounds. No stridor. No wheezing, rhonchi or rales.   Abdominal:      General: There is no distension.      Tenderness: There is no abdominal tenderness. There is no guarding or rebound.   Musculoskeletal:         General: No tenderness.      Right lower leg: No edema.       Left lower leg: No edema.   Skin:     General: Skin is warm.      Capillary Refill: Capillary refill takes less than 2 seconds.   Neurological:      Mental Status: He is alert and oriented to person, place, and time. Mental status is at baseline.      Sensory: No sensory deficit.      Motor: No weakness.   Psychiatric:         Mood and Affect: Mood normal.         Behavior: Behavior normal.         Thought Content: Thought content normal.           Emergency Department Course   ECG  ECG taken at 923, ECG read at 929  Sinus rhythm with occasional 3 ventricular complexes  Right bundle quita block  Abnormal ECG  No STEMI  Rate 60 bpm. OK interval 208 ms. QRS duration 136 ms. QT/QTc 438/438 ms. P-R-T axes 60 -12 62.    Imaging:  Chest XR,  PA & LAT   Final Result   IMPRESSION: Negative chest.      NM Lexiscan stress test (nuc card)    (Results Pending)       Laboratory:  Labs Ordered and Resulted from Time of ED Arrival to Time of ED Departure   BASIC METABOLIC PANEL - Abnormal       Result Value    Sodium 135      Potassium 4.4      Chloride 101      Carbon Dioxide (CO2) 24      Anion Gap 10      Urea Nitrogen 26.1 (*)     Creatinine 0.91      GFR Estimate 83      Calcium 9.5      Glucose 120 (*)    TROPONIN T, HIGH SENSITIVITY - Abnormal    Troponin T, High Sensitivity 34 (*)    CBC WITH PLATELETS AND DIFFERENTIAL - Abnormal    WBC Count 12.2 (*)     RBC Count 4.63      Hemoglobin 14.7      Hematocrit 43.9      MCV 95      MCH 31.7      MCHC 33.5      RDW 13.2      Platelet Count 209      % Neutrophils 78      % Lymphocytes 11      % Monocytes 10      % Eosinophils 0      % Basophils 0      % Immature Granulocytes 1      NRBCs per 100 WBC 0      Absolute Neutrophils 9.5 (*)     Absolute Lymphocytes 1.3      Absolute Monocytes 1.2      Absolute Eosinophils 0.0      Absolute Basophils 0.0      Absolute Immature Granulocytes 0.1      Absolute NRBCs 0.0     TROPONIN T, HIGH SENSITIVITY - Abnormal    Troponin T, High  Sensitivity 31 (*)    TSH WITH FREE T4 REFLEX - Normal    TSH 3.30     D DIMER QUANTITATIVE - Normal    D-Dimer Quantitative 0.46          Procedures       Emergency Department Course & Assessments:    Interventions:  Medications   senna-docusate (SENOKOT-S/PERICOLACE) 8.6-50 MG per tablet 1 tablet (has no administration in time range)     Or   senna-docusate (SENOKOT-S/PERICOLACE) 8.6-50 MG per tablet 2 tablet (has no administration in time range)   ondansetron (ZOFRAN ODT) ODT tab 4 mg (has no administration in time range)     Or   ondansetron (ZOFRAN) injection 4 mg (has no administration in time range)   acetaminophen (TYLENOL) tablet 650 mg (has no administration in time range)     Or   acetaminophen (TYLENOL) Suppository 650 mg (has no administration in time range)   aspirin EC tablet 81 mg (has no administration in time range)   nitroGLYcerin (NITROSTAT) sublingual tablet 0.4 mg (has no administration in time range)   alum & mag hydroxide-simethicone (MAALOX) suspension 30 mL (has no administration in time range)   sodium chloride (PF) 0.9% PF flush 1-10 mL (has no administration in time range)   sodium chloride (PF) 0.9% PF flush 10 mL ( Intravenous Canceled Entry 3/3/24 1652)   HOLD: Caffeine containing medications 12 hours prior to the procedure (has no administration in time range)   HOLD: dipyridamole (PERSANTINE) or aspirin/dipyridamole (AGGRENOX) 48 hours prior to the procedure (has no administration in time range)   HOLD: theophylline or aminophylline 12 hours prior to the procedure (has no administration in time range)   IF patient diabetic - HOLD: ALL ORAL HYPOGLYCEMICS and include: glipizide, glyburide, glimepiride, gliclazide, metformin, any metformin containing medication, on day of the procedure (has no administration in time range)   gabapentin (NEURONTIN) capsule 200 mg (200 mg Oral $Given 3/3/24 1627)   aspirin (ASA) tablet 325 mg (325 mg Oral $Given 3/3/24 1026)            Independent  Interpretation (X-rays, CTs, rhythm strip):  CXR: No pneumothorax by me Steimer effusion or infiltrate.    Consultations/Discussion of Management or Tests:    ED Course as of 03/03/24 2053   Sun Mar 03, 2024   1158 Spoke with Dr. Wily Atkins who recommends admission for monitoring and potential stress testing   1213 Discussed the results of the testing with the patient.  Patient is agreeable for admission.  He continues to have no chest pain.   1228 Spoke with Dr. Zavala who has agreed for admission       Social Determinants of Health affecting care:  None     Disposition:  The patient was admitted to the hospital under the care of Dr. Zavala.    Impression & Plan    CMS Diagnoses: None    Medical Decision Making:    This is 83-year-old male who reports concerns regarding intermittent episodes of racing heart rate up into the 130s with most recent overnight early morning today.  Fortunately the patient's had no accompanying chest pain shortness of breath or otherwise ill numbness at this time.  He currently is asymptomatic.  Heart monitoring so far has not captured any elevated heart rate and EKG shows no dysrhythmia.  Patient did [place] the the racing heart rate on his own by blowing into a straw.  Makes me suspect that could he has been having SVT.  It is noted that his troponins are elevated but delta Trop is flattened.  Patient has normal electrolytes without any abnormalities.  CBC shows slightly elevated white blood cell count could be related to stress there is no evidence of infection today and he is not having any symptoms of infection and no fever.  Hemoglobin normal.  D-dimer negative making PE unlikely.  Normal TSH.  Again he is not having any chest pain and I think ACS is unlikely.  I did give him some aspirin here today.  I did speak with cardiology who feels that it would be risky to send this patient home with elevated tropes and could consider stress testing in the hospital.  Patient is  agreeable to stay in the hospital for cardiac monitoring and stress testing and he will be admitted under  service.    Diagnosis:    ICD-10-CM    1. Racing heart beat  R00.0       2. Elevated troponin I level  R79.89            Discharge Medications:  Current Discharge Medication List             3/3/2024   Shilo Aviles, Shilo Faustin PA-C  03/03/24 2053

## 2024-03-03 NOTE — PLAN OF CARE
PRIMARY DIAGNOSIS: CHEST PALPITATION   OUTPATIENT/OBSERVATION GOALS TO BE MET BEFORE DISCHARGE:  1. Ruled out acute coronary syndrome (negative or stable Troponin):  Yes  2. Pain Status: Pain free.  3. Appropriate provocative testing performed: No  - Stress Test Procedure: Lesiscan in AM  - Interpretation of cardiac rhythm per telemetry tech: SB/HR 49    4. Cleared by Consultants (if applicable):N/A  5. Return to near baseline physical activity: Yes  Discharge Planner Nurse   Safe discharge environment identified: Yes  Barriers to discharge: Yes       Entered by: ANGEL MORIN RN 03/03/2024 4:59 PM   Vital signs:  Temp: 97.3  F (36.3  C) Temp src: Oral BP: 103/53 Pulse: 53   Resp: 15 SpO2: 95 % O2 Device: None (Room air)   Alert and oriented x4. Denies chest pain, palpitation, SOB. On tele running SB. On regular diet. No caffeine 12 hrs prior to Lexiscan and NPO after 4am. PIV saline locked. Moves SBA. Bed alarm on. Call light in reach.    Please review provider order for any additional goals.   Nurse to notify provider when observation goals have been met and patient is ready for discharge.

## 2024-03-03 NOTE — PLAN OF CARE
ROOM # 202-1    Living Situation (if not independent, order SW consult): home with wife  Facility name:  : [facility] (spouse)    Activity level at baseline: Inde  Activity level on admit: SBA    Who will be transporting you at discharge: wife    Patient registered to observation; given Patient Bill of Rights; given the opportunity to ask questions about observation status and their plan of care.  Patient has been oriented to the observation room, bathroom and call light is in place.    Discussed discharge goals and expectations with patient/family.

## 2024-03-03 NOTE — PHARMACY-ADMISSION MEDICATION HISTORY
Pharmacist Admission Medication History    Admission medication history is complete. The information provided in this note is only as accurate as the sources available at the time of the update.    Information Source(s): Patient and CareEverywhere/SureScripts via in-person    Pertinent Information: -    Changes made to PTA medication list:  Added: all meds  Deleted: None  Changed: None    Allergies reviewed with patient and updates made in EHR: yes    Medication History Completed By: Jasper Hook RP 3/3/2024 1:16 PM    Prior to Admission medications    Medication Sig Last Dose Taking? Auth Provider Long Term End Date   atorvastatin (LIPITOR) 20 MG tablet Take 20 mg by mouth daily 3/3/2024 at am Yes Unknown, Entered By History No    gabapentin (NEURONTIN) 100 MG capsule Take 200 mg by mouth every evening 3/2/2024 at 4PM Yes Unknown, Entered By History No

## 2024-03-03 NOTE — TELEPHONE ENCOUNTER
Patient states over the last 6 weeks has been having a rapid heartbeat and then went away.  Patient was awaken this morning at 2:00 am with rapid heartbeat.  Again another episode at 7:00 am.   Patient states that he will go to ER.      Reason for Disposition   Age > 60 years  (Exception: Brief heartbeat symptoms that went away and now feels well.)    Additional Information   Negative: Shock suspected (e.g., cold/pale/clammy skin, too weak to stand, low BP, rapid pulse)   Negative: Passed out (i.e., lost consciousness, collapsed and was not responding)   Negative: Difficult to awaken or acting confused (e.g., disoriented, slurred speech)   Negative: Visible sweat on face or sweat dripping down face   Negative: Unable to walk, or can only walk with assistance (e.g., requires support)   Negative: [1] Received SHOCK from implantable cardiac defibrillator AND [2] persisting symptoms (i.e., palpitations, lightheadedness)   Negative: [1] Dizziness, lightheadedness, or weakness AND [2] heart beating very rapidly (e.g., > 140 / minute)   Negative: [1] Dizziness, lightheadedness, or weakness AND [2] heart beating very slowly (e.g., < 50 / minute)   Negative: Sounds like a life-threatening emergency to the triager   Negative: Difficulty breathing   Negative: Dizziness, lightheadedness, or weakness   Negative: [1] Heart beating very rapidly (e.g., > 140 / minute) AND [2] present now  (Exception: During exercise.)   Negative: Heart beating very slowly (e.g., < 50 / minute)  (Exception: Athlete and heart rate normal for caller.)   Negative: New or worsened shortness of breath with activity (dyspnea on exertion)   Negative: Patient sounds very sick or weak to the triager   Negative: [1] Heart beating very rapidly (e.g., > 140 / minute) AND [2] not present now  (Exception: During exercise.)   Negative: [1] Skipped or extra beat(s) AND [2] increases with exercise or exertion   Negative: [1] Skipped or extra beat(s) AND [2] occurs 4  or more times per minute   Negative: New or worsened ankle swelling   Negative: History of heart disease (i.e., heart attack, bypass surgery, angina, angioplasty, CHF)  (Exception: Brief heartbeat symptoms that went away and now feels well.)    Protocols used: Heart Rate and Heartbeat [id number]

## 2024-03-03 NOTE — ED NOTES
Agree with triage- patient states that he's had chest palpitations for a month- was on the Zio patch and told he had no events. Has been doing home vaso-vagal treatments at home and using ice packs, bearing down, and pursed breathing.

## 2024-03-03 NOTE — ED TRIAGE NOTES
Presents to triage with c/o palpitations that began around 0200 that resolved and then returned at around 0700 that resolved after about 20 min. Patient has had these episodes previously and they have always resolved without intervention. Denies any chest pain or discomfort.

## 2024-03-03 NOTE — H&P
[facility]    History and Physical - Hospitalist Service       Date of Admission:  3/3/2024  Primary Care Physician   Shayan Dixon  CONSULTANTS: none     Assessment & Plan      Cleveland Long is a 83 year old male who is a very pleasant 83-year-old male with a history of nonobstructive coronary disease, hyperlipidemia, spondylolysis of the back followed by pain clinic who presents with recurrent palpitations and a racing heart to the emergency room.  Patient has been having symptoms on and off for quite some time.  He actually underwent a Zio patch with [facility] with Dr. Obrien and I reviewed the records myself showing SVT.  He had recurrent symptoms at home today with heart rate up to the 130s which woke him up.  Patient went on Google to figure out what to do and tried Valsalva maneuvers and he thinks he felt better.  He did call the nurse line and they told him to come to the emergency room.  Patient has had absolutely no symptoms of an acute coronary syndrome.  He has had no dyspnea on exertion, shortness of breath, nausea, vomiting, diaphoresis, nor any chest pain.  Patient does have a history of being worked up in the past and 1/2013 with a calcium score of 177.8.  For that he underwent a CT coronary artery angiogram 2/18/2013 at [facility] that only showed nonobstructive 10-25% coronary disease.  He underwent a nuclear perfusion test after that which was normal.   In the emergency room, patient had a slight elevation in his troponin at 34 which came down to 31.  His creatinine is normal.  TSH was 3.3.  His white count was up at 12.2 and has no infectious symptoms whatsoever and this is likely due to stress.  He also underwent a chest x-ray which was normal which did not show any pacemaker in place even though cardiology at [facility] note states that he had a pacemaker placed in the past.  Patient denies having a pacemaker.  Telemetry in the emergency room showing sinus heart  rate in the 50s.  Patient denies any dizziness or lightheadedness.       Palpitations, SVT  Patient presented with palpitations and a heart rate of 130 at home.  He googled what to do and he treated himself for SVT and his symptoms improved.  He called the nurses line who told him to come in.  Telemetry currently showing a sinus bradycardia.  I did review Zio patch from 1/31/2024 which showed runs of SVT.  At that time cardiology at [facility] was thinking about starting the patient on Toprol 25 mg a day and they talked about a stress test but they did not do either.  I suspect we will have to give the patient a as needed Lopressor dose as needed at home when he feels the symptoms.  I am hesitant to start him on long-acting Toprol as he is already bradycardic at baseline.    Positive troponins, coronary artery disease  Patient presented with a troponin of 34 and it did go down to 31 on repeat.  He has absolutely no symptoms of an acute coronary syndrome other than possible tachycardia.  I suspect that his tachycardia is what led to the troponin leak.  He does have a history of an elevated calcium score in the past in 2013 but had a negative perfusion stress test at that time.  At this point patient already is on a statin which we will continue.  Will place the patient on daily aspirin.  Will check another stress test tomorrow and if negative he will be able to go home.  I did discuss with the patient that if it shows something abnormal then we may need to do an angiogram and he was okay with that.    Hyperlipidemia   Patient to continue on his home statin at discharge.  As the patient's observation will hold for now.    Sinus bradycardia   Patient at baseline is currently in sinus bradycardia in the 50s.  He is completely asymptomatic and c currently is not on any rate control medications.  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 Castelan at neuro clinic having just had an injection of L4 3/1/2024 per the patient's report.  He states that he is already feeling better.  Takes Neurontin at night which we will continue.    Advance care planning  Had a discussion with the patient about advance care planning.  Patient states that he does have a healthcare directive that he is working on at home.  He told me number of stories where he watched his father die on the ventilator after undergoing a CEA and having a stroke he is also had 2 close friends recently die after deciding to forego heroics.  Patient made it clear that he wants to be comfortable.  He does not want CPR and wants to be let go if he dies.  We had a long discussion about being intubated.  He is okay with a minimal amount of intubation for the short-term if completely reversible and that that would give him back his quality life that he is used to but if not he would not want to be intubated.  He stated that he would leave it up to the doctors to make those tough decisions depending on how he presented.  Patient at this point is okay being DNR/may intubate for short-term.  I instructed the patient discussed this with all of his children along with his wife which she says he will do.    4Ms:  Polypharmacy: Patient is on a very minimal amount of medications at this point and currently appropriate  Mentation:  Capacity intact to make his own medical decisions  Social support: He is  and lives with his [facility] in [facility], he has 2 living sons and 2 living daughters all of which are local except for the son that lives in [place].  They did have a daughter  in childbirth  Mobility: Does not use any aids to get around  What is importmant: To live at home with his wife as long as possible until 1 over cannot make it.  Wants to be comfortable.  Does not want aggressive cares done in  general.    Discussed plan of care with Amrik Aviles in the emergency room   Diet:  Regular  DVT Prophylaxis: Low Risk/Ambulatory with no VTE prophylaxis indicated  Castaneda Catheter: Not present  Lines: None     Cardiac Monitoring: None    RESTRAINTS: Not indicated  Code Status:  DNR/may intubate for only short-term        This document was created using voice recognition technology.  Please excuse any typographical errors that may have occurred.  Please call with any questions.     Clinically Significant Risk Factors Present on Admission                                  Disposition Plan      Expected Discharge Date: 03/04/2024           home if his stress test is normal       Cortez Chance MD  Hospitalist Service  [facility]  Securely message with [employer] (more info)  Text page via [employer] Paging/Directory     Length of stay: Anticipate less than 2 midnight hospitalization for evaluation and treatment of SVT, palpitations          ______________________________________________________________________    Chief Complaint   Racing heart    History is obtained from the patient  Epic reviewed and [facility]    History of Present Illness   Cy BAYRON Long is a 83 year old male who is a very pleasant 83-year-old male with a history of nonobstructive coronary disease, hyperlipidemia, spondylolysis of the back followed by pain clinic who presents with recurrent palpitations and a racing heart to the emergency room.  Patient has been having symptoms on and off for quite some time.  He actually underwent a Zio patch with [facility] with Dr. Obrien and I reviewed the records myself showing SVT.  He had recurrent symptoms at home today with heart rate up to the 130s which woke him up.  Patient went on Google to figure out what to do and tried Valsalva maneuvers and he thinks he felt better.  He did call the nurse line and they told him to come to the emergency room.  Patient has had absolutely no  symptoms of an acute coronary syndrome.  He has had no dyspnea on exertion, shortness of breath, nausea, vomiting, diaphoresis, nor any chest pain.  Patient does have a history of being worked up in the past and 1/2013 with a calcium score of 177.8.  For that he underwent a CT coronary artery angiogram 2/18/2013 at [facility] that only showed nonobstructive 10-25% coronary disease.  He underwent a nuclear perfusion test after that which was normal.   In the emergency room, patient had a slight elevation in his troponin at 34 which came down to 31.  His creatinine is normal.  TSH was 3.3.  His white count was up at 12.2 and has no infectious symptoms whatsoever and this is likely due to stress.  He also underwent a chest x-ray which was normal which did not show any pacemaker in place even though cardiology at [facility]'s note states that he had a pacemaker placed in the past.  Patient denies having a pacemaker.  Telemetry in the emergency room showing sinus heart rate in the 50s.  Patient denies any dizziness or lightheadedness.      Past Medical History    Past Medical History:   Diagnosis Date    Arthritis 1990    increasing symptoms    Cancer (H) 2007    Melanoma excised from right facial cheek    Diverticulitis of colon 01/01/2011    Heart disease 2007    Plac: taking low dose generic Lipitor       Past Surgical History   Past Surgical History:   Procedure Laterality Date    APPENDECTOMY      CATARACT IOL, RT/LT      bilateral    COLONOSCOPY  2014    Last exam    GENITOURINARY SURGERY      repair of testicular hematoma    HERNIA REPAIR      bilateral inguinal       Prior to Admission Medications   Prior to Admission Medications   Prescriptions Last Dose Informant Patient Reported? Taking?   atorvastatin (LIPITOR) 20 MG tablet 3/3/2024 at am  Yes Yes   Sig: Take 20 mg by mouth daily   gabapentin (NEURONTIN) 100 MG capsule 3/2/2024 at 4PM  Yes Yes   Sig: Take 200 mg by mouth every evening      Facility-Administered  Medications: None        Allergies   No Known Allergies     REVIEW OF SYSTEMS:  A comprehensive review of systems was negative except for items noted in the HPI.  Patient currently denies any fever, chills, sweats, nausea, vomiting, diarrhea, shortness of breath, or chest pain.  Patient feels great currently.      Physical Exam   Vital Signs: Temp: (!) 96.2  F (35.7  C) Temp src: Temporal BP: 128/67 Pulse: 56   Resp: 15 SpO2: 98 % O2 Device: None (Room air)    Weight: 169 lbs 8.54 oz    General appearance: Patient is alert and oriented x3, no apparent distress, pleasant and conversing normally, speaking in full sentences, appears younger than stated age, he actually looks great for his age  HEENT:  Atraumatic/normal cephalic, EOMI, Pupils equally round and reactive to light, sclera non-icteric, Mucous membranes are moist  NECK:  supple without bruit or lymphadenopathy  RESPIRATORY: Clear to auscultation bilateral, good air movement  CARDIOVASCULAR: Regular rhythm and slightly bradycardic, normal S1/S2, no murmurs, rubs, or gallops present, peripheral pulses intact  GASTROINTESTINAL: Non-distended, non-tender, soft, bowel sounds present throughout, no mass or hepatosplenomegaly  MUSCULOSKELETAL: without deformity, normal range of motion  SKIN:  Warm, dry, no rashes, no mottling  NEUROLOGIC:  Cranial nerves II-XII intact, without any focal deficits, strength 5/5 throughout  EXTREMITIES:  Moves all extremities, no clubbing, cyanosis, nor edema  :  Tonya not present         Data     I have personally reviewed the following data over the past 24 hrs:    12.2 (H)  \   14.7   / 209     135 101 26.1 (H) /  120 (H)   4.4 24 0.91 \     Trop: 31 (H) BNP: N/A     TSH: 3.30 T4: N/A A1C: N/A     INR:  N/A PTT:  N/A   D-dimer:  0.46 Fibrinogen:  N/A       Imaging:   Results for orders placed or performed during the hospital encounter of 03/03/24   Chest XR,  PA & LAT    Narrative    EXAM: XR CHEST 2 VIEWS  LOCATION: [facility]  DATE: 3/3/2024    INDICATION: racing heart rate, elevted trop  COMPARISON: None.      Impression    IMPRESSION: Negative chest.     Procedures: Lexiscan stress test pending 3/4/2024  I have personally have reviewed the patient's most up to date radiologic exams, EKG, telemetry, labs, orders, and medications myself

## 2024-03-03 NOTE — ED NOTES
[facility]  ED Nurse Handoff Report    ED Chief complaint: Palpitations  . ED Diagnosis:   Final diagnoses:   Racing heart beat   Elevated troponin I level       Allergies: No Known Allergies    Code Status: unknown     Activity level - Baseline/Home:  independent.  Activity Level - Current:   standby.   Lift room needed: No.   Bariatric: No   Needed: No   Isolation: No.   Infection: Not Applicable.     Respiratory status: Room air    Vital Signs (within 30 minutes):   Vitals:    03/03/24 1113 03/03/24 1118 03/03/24 1127 03/03/24 1147   BP: 108/50  119/70 118/78   Pulse: 54 55 53 56   Resp:  18 13 14   Temp:       TempSrc:       SpO2:  98% 95% 97%   Weight:       Height:           Cardiac Rhythm:  ,   Cardiac  Cardiac Rhythm: Sinus bradycardia  Pain level:    Patient confused: No.   Patient Falls Risk: nonskid shoes/slippers when out of bed and activity supervised.   Elimination Status: Has voided     Patient Report - Initial Complaint: palpations.   Focused Assessment: elevated troponin      Abnormal Results:   Labs Ordered and Resulted from Time of ED Arrival to Time of ED Departure   BASIC METABOLIC PANEL - Abnormal       Result Value    Sodium 135      Potassium 4.4      Chloride 101      Carbon Dioxide (CO2) 24      Anion Gap 10      Urea Nitrogen 26.1 (*)     Creatinine 0.91      GFR Estimate 83      Calcium 9.5      Glucose 120 (*)    TROPONIN T, HIGH SENSITIVITY - Abnormal    Troponin T, High Sensitivity 34 (*)    CBC WITH PLATELETS AND DIFFERENTIAL - Abnormal    WBC Count 12.2 (*)     RBC Count 4.63      Hemoglobin 14.7      Hematocrit 43.9      MCV 95      MCH 31.7      MCHC 33.5      RDW 13.2      Platelet Count 209      % Neutrophils 78      % Lymphocytes 11      % Monocytes 10      % Eosinophils 0      % Basophils 0      % Immature Granulocytes 1      NRBCs per 100 WBC 0      Absolute Neutrophils 9.5 (*)     Absolute Lymphocytes 1.3      Absolute Monocytes 1.2       Absolute Eosinophils 0.0      Absolute Basophils 0.0      Absolute Immature Granulocytes 0.1      Absolute NRBCs 0.0     TROPONIN T, HIGH SENSITIVITY - Abnormal    Troponin T, High Sensitivity 31 (*)    TSH WITH FREE T4 REFLEX - Normal    TSH 3.30     D DIMER QUANTITATIVE - Normal    D-Dimer Quantitative 0.46          Chest XR,  PA & LAT   Final Result   IMPRESSION: Negative chest.          Treatments provided: see MAR  Family Comments: n/a  OBS brochure/video discussed/provided to patient:  Yes  ED Medications:   Medications   aspirin (ASA) tablet 325 mg (325 mg Oral $Given 3/3/24 1026)       Drips infusing:  No  For the majority of the shift this patient was Green.   Interventions performed were n/a.    Sepsis treatment initiated: No    Cares/treatment/interventions/medications to be completed following ED care: cardiology referral     ED Nurse Name: Neelima Chaves RN  12:33 PM  RECEIVING UNIT ED HANDOFF REVIEW    Above ED Nurse Handoff Report was reviewed: Yes  Reviewed by: ANGEL MORIN RN on March 3, 2024 at 2:51 PM   JOSE Vasquez called the ED to inform them the note was read: Yes

## 2024-03-04 ENCOUNTER — MEDICAL CORRESPONDENCE (OUTPATIENT)
Dept: HEALTH INFORMATION MANAGEMENT | Facility: CLINIC | Age: 84
End: 2024-03-04

## 2024-03-04 ENCOUNTER — APPOINTMENT (OUTPATIENT)
Dept: CARDIOLOGY | Facility: CLINIC | Age: 84
End: 2024-03-04
Attending: INTERNAL MEDICINE
Payer: COMMERCIAL

## 2024-03-04 ENCOUNTER — APPOINTMENT (OUTPATIENT)
Dept: NUCLEAR MEDICINE | Facility: CLINIC | Age: 84
End: 2024-03-04
Attending: INTERNAL MEDICINE
Payer: COMMERCIAL

## 2024-03-04 VITALS
BODY MASS INDEX: 22.42 KG/M2 | OXYGEN SATURATION: 98 % | RESPIRATION RATE: 16 BRPM | TEMPERATURE: 97.8 F | SYSTOLIC BLOOD PRESSURE: 123 MMHG | WEIGHT: 165.5 LBS | HEART RATE: 50 BPM | HEIGHT: 72 IN | DIASTOLIC BLOOD PRESSURE: 63 MMHG

## 2024-03-04 LAB
ATRIAL RATE - MUSE: 60 BPM
CV BLOOD PRESSURE: 63 MMHG
CV STRESS MAX HR HE: 73
DIASTOLIC BLOOD PRESSURE - MUSE: NORMAL MMHG
INTERPRETATION ECG - MUSE: NORMAL
NUC STRESS EJECTION FRACTION: 65 %
P AXIS - MUSE: 66 DEGREES
PR INTERVAL - MUSE: 208 MS
QRS DURATION - MUSE: 136 MS
QT - MUSE: 438 MS
QTC - MUSE: 438 MS
R AXIS - MUSE: -12 DEGREES
RATE PRESSURE PRODUCT: 7081
STRESS ECHO BASELINE DIASTOLIC HE: 50
STRESS ECHO BASELINE HR: 62 BPM
STRESS ECHO BASELINE SYSTOLIC BP: 113
STRESS ECHO CALCULATED PERCENT HR: 54 %
STRESS ECHO LAST STRESS DIASTOLIC BP: 52
STRESS ECHO LAST STRESS SYSTOLIC BP: 97
STRESS ECHO TARGET HR: 135
SYSTOLIC BLOOD PRESSURE - MUSE: NORMAL MMHG
T AXIS - MUSE: 62 DEGREES
VENTRICULAR RATE- MUSE: 60 BPM

## 2024-03-04 PROCEDURE — A9500 TC99M SESTAMIBI: HCPCS | Performed by: INTERNAL MEDICINE

## 2024-03-04 PROCEDURE — 78452 HT MUSCLE IMAGE SPECT MULT: CPT | Mod: 26 | Performed by: INTERNAL MEDICINE

## 2024-03-04 PROCEDURE — G0378 HOSPITAL OBSERVATION PER HR: HCPCS

## 2024-03-04 PROCEDURE — 250N000011 HC RX IP 250 OP 636

## 2024-03-04 PROCEDURE — 93017 CV STRESS TEST TRACING ONLY: CPT

## 2024-03-04 PROCEDURE — 343N000001 HC RX 343: Performed by: INTERNAL MEDICINE

## 2024-03-04 PROCEDURE — 93018 CV STRESS TEST I&R ONLY: CPT | Performed by: INTERNAL MEDICINE

## 2024-03-04 PROCEDURE — 78452 HT MUSCLE IMAGE SPECT MULT: CPT

## 2024-03-04 PROCEDURE — 93016 CV STRESS TEST SUPVJ ONLY: CPT | Performed by: INTERNAL MEDICINE

## 2024-03-04 PROCEDURE — 99239 HOSP IP/OBS DSCHRG MGMT >30: CPT | Performed by: PHYSICIAN ASSISTANT

## 2024-03-04 RX ORDER — REGADENOSON 0.08 MG/ML
INJECTION, SOLUTION INTRAVENOUS
Status: COMPLETED
Start: 2024-03-04 | End: 2024-03-04

## 2024-03-04 RX ORDER — AMINOPHYLLINE 25 MG/ML
50-100 INJECTION, SOLUTION INTRAVENOUS
Status: DISCONTINUED | OUTPATIENT
Start: 2024-03-04 | End: 2024-03-04

## 2024-03-04 RX ORDER — ALBUTEROL SULFATE 90 UG/1
2 AEROSOL, METERED RESPIRATORY (INHALATION) EVERY 5 MIN PRN
Status: DISCONTINUED | OUTPATIENT
Start: 2024-03-04 | End: 2024-03-04

## 2024-03-04 RX ORDER — ACYCLOVIR 200 MG/1
0-1 CAPSULE ORAL
Status: DISCONTINUED | OUTPATIENT
Start: 2024-03-04 | End: 2024-03-04 | Stop reason: HOSPADM

## 2024-03-04 RX ORDER — REGADENOSON 0.08 MG/ML
0.4 INJECTION, SOLUTION INTRAVENOUS ONCE
Status: DISCONTINUED | OUTPATIENT
Start: 2024-03-04 | End: 2024-03-04

## 2024-03-04 RX ADMIN — REGADENOSON 0.4 MG: 0.08 INJECTION, SOLUTION INTRAVENOUS at 08:59

## 2024-03-04 RX ADMIN — Medication 10.4 MILLICURIE: at 07:44

## 2024-03-04 RX ADMIN — Medication 33 MILLICURIE: at 09:01

## 2024-03-04 ASSESSMENT — ACTIVITIES OF DAILY LIVING (ADL)
ADLS_ACUITY_SCORE: 21
ADLS_ACUITY_SCORE: 20
ADLS_ACUITY_SCORE: 21
ADLS_ACUITY_SCORE: 20
ADLS_ACUITY_SCORE: 20
ADLS_ACUITY_SCORE: 21

## 2024-03-04 NOTE — PROVIDER NOTIFICATION
Updated x-cover of the following;     Patient having multiple pauses with longest being 2.1 seconds per tele tech

## 2024-03-04 NOTE — PLAN OF CARE
PRIMARY DIAGNOSIS: Palpitations   OUTPATIENT/OBSERVATION GOALS TO BE MET BEFORE DISCHARGE:  1. Ruled out acute coronary syndrome (negative or stable Troponin):  Yes  2. Pain Status: Pain free.  3. Appropriate provocative testing performed: No  - Stress Test Procedure: Lexiscan  - Interpretation of cardiac rhythm per telemetry tech: SB w/ BBB    4. Cleared by Consultants (if applicable):N/A  5. Return to near baseline physical activity: Yes  Discharge Planner Nurse   Safe discharge environment identified: Yes  Barriers to discharge: Yes       Entered by: Krystal Mckinnon RN 03/04/2024 1:23 AM     Please review provider order for any additional goals.   Nurse to notify provider when observation goals have been met and patient is ready for discharge.  Vitals are Temp: 98.2  F (36.8  C) Temp src: Oral BP: 116/60 Pulse: (!) 47   Resp: 16 SpO2: 93 %.  Patient is Alert and Oriented x4. They are independent with no assistive devices .  Pt is a Regular diet, NPO 3 hours before cardiac test. Patient is Saline locked. Plan is for Lexiscan in the morning.

## 2024-03-04 NOTE — PLAN OF CARE
PRIMARY DIAGNOSIS: Palpatations  OUTPATIENT/OBSERVATION GOALS TO BE MET BEFORE DISCHARGE:  ADLs back to baseline: Yes    Activity and level of assistance: Ambulating independently.    Pain status: Pain free.    Return to near baseline physical activity: Yes     Discharge Planner Nurse   Safe discharge environment identified: Yes  Barriers to discharge: Yes       Entered by: Savannah Reyes RN 03/04/2024 11:10 AM     Please review provider order for any additional goals.   Nurse to notify provider when observation goals have been met and patient is ready for discharge.Goal Outcome Evaluation:      Plan of Care Reviewed With: patient    Overall Patient Progress: improvingOverall Patient Progress: improving     Pt A&Ox4; vss on r/a. Tele SB 40s/50s. Pt NPO pending Lexiscan results. Up adlib. Denies all complaints. Pt calls for cares.

## 2024-03-04 NOTE — PLAN OF CARE
PRIMARY DIAGNOSIS: Palpitations   OUTPATIENT/OBSERVATION GOALS TO BE MET BEFORE DISCHARGE:  1. Ruled out acute coronary syndrome (negative or stable Troponin):  Yes  2. Pain Status: Pain free.  3. Appropriate provocative testing performed: No  - Stress Test Procedure: Lexiscan  - Interpretation of cardiac rhythm per telemetry tech: SB w/ BBB    4. Cleared by Consultants (if applicable):N/A  5. Return to near baseline physical activity: Yes  Discharge Planner Nurse   Safe discharge environment identified: Yes  Barriers to discharge: Yes       Entered by: Krystal Mckinnon RN 03/04/2024 1:20 AM     Please review provider order for any additional goals.   Nurse to notify provider when observation goals have been met and patient is ready for discharge.  Vitals are Temp: 98.2  F (36.8  C) Temp src: Oral BP: 116/60 Pulse: (!) 47   Resp: 16 SpO2: 93 %.  Patient is Alert and Oriented x4. They are independent with no assistive devices .  Pt is a Regular diet.  They are denying pain.  Patient is Saline locked. Denies nausea/dizziness/SOB. Denies chest pain and palpitations. Plan is for Lexiscan in the morning.

## 2024-03-04 NOTE — PROGRESS NOTES
Patient's After Visit Summary was reviewed with patient   Patient verbalized understanding of After Visit Summary, recommended follow up and was given an opportunity to ask questions.   Discharge medications sent home with patient/family: Not applicable   Discharged with other: Self    Pt discharge with spouse to personal vehicle, self care. Pt has all belongings all questions answered, and voices an understanding of all discharge instructions.     OBSERVATION patient END time: [id number]

## 2024-03-04 NOTE — PLAN OF CARE
PRIMARY DIAGNOSIS: Palpitations   OUTPATIENT/OBSERVATION GOALS TO BE MET BEFORE DISCHARGE:  1. Ruled out acute coronary syndrome (negative or stable Troponin):  Yes  2. Pain Status: Pain free.  3. Appropriate provocative testing performed: No  - Stress Test Procedure: Lexiscan  - Interpretation of cardiac rhythm per telemetry tech: SB w/ BBB    4. Cleared by Consultants (if applicable):N/A  5. Return to near baseline physical activity: Yes  Discharge Planner Nurse   Safe discharge environment identified: Yes  Barriers to discharge: Yes       Entered by: Krystal Mckinnon RN 03/04/2024 4:26 AM     Please review provider order for any additional goals.   Nurse to notify provider when observation goals have been met and patient is ready for discharge.  Vitals are Temp: 98.2  F (36.8  C) Temp src: Oral BP: 116/60 Pulse: (!) 47   Resp: 16 SpO2: 93 %.  Patient is Alert and Oriented x4. They are independent with no assistive devices .  Pt is a Regular diet, NPO 3 hours before cardiac test. Patient is Saline locked. Plan is for Lexiscan in the morning.

## 2024-03-04 NOTE — DISCHARGE SUMMARY
[facility]  Hospitalist Discharge Summary      Date of Admission:  3/3/2024  Date of Discharge:  3/4/2024  2:17 PM  Discharging Provider: Tyra Townsend PA-C  Discharge Service: Hospitalist Service    Discharge Diagnoses   Palpitations  pSVT  Elevated troponin   Sinus bradycardia     Clinically Significant Risk Factors          Follow-ups Needed After Discharge   Follow-up Appointments     Follow-up and recommended labs and tests       Follow up with primary care provider, Shayan Dixon, within 7 days for   hospital follow- up.  No follow up labs or test are needed.  Follow up with your primary cardiologist to discuss PRN metoprolol for SVT   symptoms. Not given here due to short pauses noted on cardiac monitoring.              Unresulted Labs Ordered in the Past 30 Days of this Admission       No orders found for last 31 day(s).        These results will be followed up by PCP    Discharge Disposition   Discharged to home  Condition at discharge: Stable    Hospital Course   Cleveland Long is a 83 year old male with a history of nonobstructive coronary disease, hyperlipidemia, and spondylolysis of the back followed by pain clinic, who was admitted 3/3/24 with recurrent palpitations and tachycardia.  Patient has been having symptoms on and off for quite some time.  He actually underwent a Zio patch with [facility] with Dr. Obrien which showed SVT.  He had recurrent symptoms at home today with heart rate up to the 130s which woke him up.  Patient went on Google to figure out what to do and tried Valsalva maneuvers and he thinks he felt better.  He did call the nurse line and they told him to come to the emergency room.    Patient has had absolutely no symptoms of an acute coronary syndrome.  He has had no dyspnea on exertion, shortness of breath, nausea, vomiting, diaphoresis, nor any chest pain. Patient does have a history of being worked up in the past and 1/2013 with a  calcium score of 177.8.  He underwent a nuclear perfusion test after that which was normal.  In the emergency room, patient had a slight elevation in his troponin at 34 which came down to 31.  His creatinine is normal.  TSH was 3.3.  His white count was up at 12.2 and has no infectious symptoms whatsoever and this is likely due to stress.  He also underwent a chest x-ray which was normal which did not show any pacemaker in place even though cardiology at [facility] note states that he had a pacemaker placed in the past.  Patient denies having a pacemaker.  Telemetry in the emergency room showing sinus heart rate in the 50s.  Patient denies any dizziness or lightheadedness.    Palpitations, SVT  Patient presented with palpitations and a heart rate of 130 at home.  He googled what to do and he treated himself for SVT and his symptoms improved.  He called the nurses line who told him to come in.    Telemetry currently showing a sinus bradycardia. Review of Zio patch from 1/31/2024 showed runs of SVT.  At that time, cardiology at [facility] was thinking about starting the patient on Toprol 25 mg a day and they talked about a stress test but they did not do either.   - no SVT noted here. He is bradycardic here and ~2 sec pauses noted on telemetry.   - I hesitate to start or even give him PRN metoprolol for palpitations given pauses. Will defer to his cardiologist, pt understands and agrees     Positive troponins, coronary artery disease  Patient presented with a troponin of 34 and it did go down to 31 on repeat.  He has absolutely no symptoms of an acute coronary syndrome other than possible tachycardia.    Suspect that his tachycardia is what led to the troponin leak.    He does have a history of an elevated calcium score in the past in 2013 but had a negative perfusion stress test at that time.    - NM Lexiscan done, negative for inducible ischemia  - continue home statin  - follow up with Cardiologist      Hyperlipidemia  -  resume statin   Sinus bradycardia  Patient at baseline is currently in sinus bradycardia in the 50s.  He is completely asymptomatic and currently is not on any rate control medications.    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 Castelan at neuro clinic having just had an injection of L4 3/1/2024 per the patient's report.  He states that he is already feeling better. Continue home Neurontin.    Consultations This Hospital Stay   None    Code Status   No CPR- Pre-arrest intubation OK    Time Spent on this Encounter   I, Tyra Townsend PA-C, personally saw the patient today and spent greater than 30 minutes discharging this patient.       Tyra Townsend PA-C  [facility] OBSERVATION DEPT  [address] [address]  Phone: [phone]  ______________________________________________________________________    Physical Exam   Vital Signs: Temp: 97.8  F (36.6  C) Temp src: Oral BP: 123/63 Pulse: 50   Resp: 16 SpO2: 98 % O2 Device: None (Room air)    Weight: 165 lbs 8 oz    GENERAL:  Comfortable.  PSYCH: pleasant, oriented, No acute distress.  HEART:  RRR  LUNGS:  Normal Respiratory effort.   EXTREMITIES:  able to ambulate independently  SKIN:  Dry to touch, No rash, wound or ulcerations.  NEUROLOGIC:  grossly intact       Primary Care Physician   Shayan Dixon    Discharge Orders      Reason for your hospital stay    Palpitations, likely due to pSVT. None detected here but noted on a recent ZioPatch monitor. NM Lexiscan was obtained to rule out ischemia and this was unremarkable.     Follow-up and recommended labs and tests     Follow up with primary care provider, Shayan Dixon, within 7 days for hospital follow- up.  No follow up labs or test are needed.  Follow up with your primary cardiologist to discuss PRN metoprolol for SVT symptoms. Not given here due to short pauses noted on cardiac monitoring.     Activity     Your activity upon discharge: activity as tolerated     When to contact your care team    Call your primary doctor if you have any of the following: temperature greater than 101, shortness of breath, or increased chest pain.     Diet    Follow this diet upon discharge: Regular       Significant Results and Procedures   Most Recent 3 CBC's:  Recent Labs   Lab Test 03/03/24  0938 02/07/20  1410   WBC 12.2* 8.1   HGB 14.7 15.0   MCV 95 94    235     Most Recent 3 BMP's:  Recent Labs   Lab Test 03/03/24  0938 09/05/23  0859 05/16/22  1408    141 140   POTASSIUM 4.4 4.1 4.4   CHLORIDE 101 108* 109   CO2 24 22 23   BUN 26.1* 16.4 17   CR 0.91 1.00 0.87   ANIONGAP 10 11 8   ANGELINA 9.5 9.5 9.4   * 98 85     Most Recent 2 LFT's:  Recent Labs   Lab Test 09/05/23  0859 05/16/22  1408   AST 27 26   ALT 21 29   ALKPHOS 43 46   BILITOTAL 1.6* 1.9*     Most Recent 3 INR's:No lab results found.  Most Recent 3 Troponin's:No lab results found.  Most Recent 3 BNP's:No lab results found.  Most Recent D-dimer:  Recent Labs   Lab Test 03/03/24  0938   DD 0.46     Most Recent Cholesterol Panel:  Recent Labs   Lab Test 09/05/23  0859   CHOL 157   LDL 79   HDL 67   TRIG 55     7-Day Micro Results       No results found for the last 168 hours.          Most Recent TSH and T4:  Recent Labs   Lab Test 03/03/24  0938   TSH 3.30     Most Recent Hemoglobin A1c:No lab results found.  Most Recent 6 glucoses:  Recent Labs   Lab Test 03/03/24  0938 09/05/23  0859 05/16/22  1408 04/14/21  0720 11/11/19  1009 09/12/18  0734   * 98 85 89 97 97     Most Recent Urinalysis:  Recent Labs   Lab Test 07/13/18  1256   COLOR Yellow   APPEARANCE Clear   URINEGLC Negative   URINEBILI Negative   URINEKETONE Negative   SG 1.015   UBLD Negative   URINEPH 7.0   PROTEIN Negative   UROBILINOGEN 0.2   NITRITE Negative   LEUKEST Negative   ,   Results for orders placed or performed during the hospital encounter of 03/03/24   Chest XR,  PA & LAT     Narrative    EXAM: XR CHEST 2 VIEWS  LOCATION: [facility]  DATE: 3/3/2024    INDICATION: racing heart rate, elevted trop  COMPARISON: None.      Impression    IMPRESSION: Negative chest.   NM Lexiscan stress test (nuc card)     Value    Target     Baseline Systolic     Baseline Diastolic BP 50    Last Stress Systolic BP 97    Last Stress Diastolic BP 52    Baseline HR 62    Max HR  73    Max Predicted HR  54    Rate Pressure Product 7,081.0    BP 63    Left Ventricular EF 65    Narrative      The nuclear stress test is negative for inducible myocardial ischemia   or infarction.    Left ventricular function is normal.    The left ventricular ejection fraction at rest is 63%.  The left   ventricular ejection fraction at stress is 65%.    There is no prior study for comparison.         Discharge Medications   Current Discharge Medication List        CONTINUE these medications which have NOT CHANGED    Details   atorvastatin (LIPITOR) 20 MG tablet Take 20 mg by mouth daily      gabapentin (NEURONTIN) 100 MG capsule Take 200 mg by mouth every evening           Allergies   No Known Allergies

## 2024-03-05 ENCOUNTER — TRANSFERRED RECORDS (OUTPATIENT)
Dept: HEALTH INFORMATION MANAGEMENT | Facility: CLINIC | Age: 84
End: 2024-03-05
Payer: COMMERCIAL

## 2024-04-08 ENCOUNTER — OFFICE VISIT (OUTPATIENT)
Dept: PEDIATRICS | Facility: CLINIC | Age: 84
End: 2024-04-08
Payer: COMMERCIAL

## 2024-04-08 VITALS
RESPIRATION RATE: 16 BRPM | DIASTOLIC BLOOD PRESSURE: 60 MMHG | TEMPERATURE: 97.7 F | SYSTOLIC BLOOD PRESSURE: 101 MMHG | BODY MASS INDEX: 25.82 KG/M2 | WEIGHT: 164.5 LBS | HEART RATE: 62 BPM | OXYGEN SATURATION: 97 % | HEIGHT: 67 IN

## 2024-04-08 DIAGNOSIS — I47.10 PAROXYSMAL SUPRAVENTRICULAR TACHYCARDIA (H): Primary | ICD-10-CM

## 2024-04-08 PROBLEM — R00.0 RACING HEART BEAT: Status: RESOLVED | Noted: 2024-03-03 | Resolved: 2024-04-08

## 2024-04-08 PROBLEM — R79.89 ELEVATED TROPONIN I LEVEL: Status: RESOLVED | Noted: 2024-03-03 | Resolved: 2024-04-08

## 2024-04-08 PROCEDURE — 99213 OFFICE O/P EST LOW 20 MIN: CPT | Performed by: INTERNAL MEDICINE

## 2024-04-08 RX ORDER — METOPROLOL TARTRATE 25 MG/1
25 TABLET, FILM COATED ORAL PRN
COMMUNITY
Start: 2024-03-25 | End: 2024-10-07

## 2024-04-08 ASSESSMENT — PAIN SCALES - GENERAL: PAINLEVEL: MILD PAIN (3)

## 2024-04-08 NOTE — PROGRESS NOTES
Assessment & Plan       ICD-10-CM    1. Paroxysmal supraventricular tachycardia (H24)  I47.10         Pt was hospitalized last month d/t episode of SVT, felt to be PSVT.  Has been seeing cardiology.  Has metoprolol for prn use should his symptoms return.  Discussed being cautious if he does need a dose d/t his already low BP and pulse.     MED REC REQUIRED  Post Medication Reconciliation Status:  Discharge medications reconciled, continue medications without change    Shayan Dixon MD      Subjective   Cleveland is a 83 year old, presenting for the following health issues:  Hospital F/U        4/8/2024    10:00 AM   Additional Questions   Roomed by robert     History of Present Illness       Reason for visit:  Annual physical    He eats 2-3 servings of fruits and vegetables daily.He consumes 0 sweetened beverage(s) daily.He exercises with enough effort to increase his heart rate 10 to 19 minutes per day.  He exercises with enough effort to increase his heart rate 3 or less days per week.   He is taking medications regularly.         Hospital Follow-up Visit:    Hospital/Nursing Home/IP Rehab Facility: [facility]  Date of Admission: 3/3/24  Date of Discharge: 3/4/24  Reason(s) for Admission: Palpitations  PSVT  Elevated troponin   Sinus bradycardia        Was your hospitalization related to COVID-19? No   Problems taking medications regularly:  None  Medication changes since discharge: None  Problems adhering to non-medication therapy:  None    Summary of hospitalization:  [facility] discharge summary reviewed  Diagnostic Tests/Treatments reviewed.  Follow up needed: No labwork needed  Other Healthcare Providers Involved in Patient s Care:         cardiology - already has been seen  Update since discharge: improved.    Plan of care communicated with patient      Cleveland is here to follow-up from his hospital stay in early March.   Had an episode of tachycardia with 's. Evaluation  "felt is d/t PSVT.  Currently has a normal HR.  Has been followed by cardiology through [facility]. Already had a visit.    No other acute c/o today.            Objective    /60 (BP Location: Right arm, Patient Position: Sitting, Cuff Size: Adult Regular)   Pulse 62   Temp 97.7  F (36.5  C) (Oral)   Resp 16   Ht 1.705 m (5' 7.13\")   Wt 74.6 kg (164 lb 8 oz)   SpO2 97%   BMI 25.67 kg/m    Body mass index is 25.67 kg/m .  Physical Exam   GEN: No distress  SKIN: No rashes  NECK: Supple. No LAD or TM.  LUNGS: Clear to auscultation bilaterally. No rhonchi, rales, wheezes or retractions.  CV: Regular rate and rhythm.  No murmurs, rubs or gallops. Pulses 2+ radial.  EXTR: No edema            Signed Electronically by: Shayan Dixon MD    "

## 2024-04-25 ENCOUNTER — TELEPHONE (OUTPATIENT)
Dept: DERMATOLOGY | Facility: CLINIC | Age: 84
End: 2024-04-25
Payer: COMMERCIAL

## 2024-04-25 ENCOUNTER — MYC MEDICAL ADVICE (OUTPATIENT)
Dept: PEDIATRICS | Facility: CLINIC | Age: 84
End: 2024-04-25

## 2024-04-25 DIAGNOSIS — M79.661 PAIN IN BOTH LOWER LEGS: Primary | ICD-10-CM

## 2024-04-25 DIAGNOSIS — M79.662 PAIN IN BOTH LOWER LEGS: Primary | ICD-10-CM

## 2024-04-25 NOTE — TELEPHONE ENCOUNTER
" Health Call Center    Phone Message    May a detailed message be left on voicemail: yes     Reason for Call: Other: Pt called concerned about spots on his \"facial cheek\". He said with the history of melanoma it's especially concerning with his Appointment out so far (08/20). Pt is also going to reach out to referring provider to see if they can send a more urgent referral     Action Taken: Other: [facility] derm    Travel Screening: Not Applicable                                                                   "

## 2024-04-26 NOTE — TELEPHONE ENCOUNTER
Could we check to see if he could be seen by Dr. Livingston (Derm) sooner than August? Is not an emergency but he wanted us to check. Thank you.

## 2024-04-29 NOTE — TELEPHONE ENCOUNTER
I spoke with Derms scheduling team and they don't have any sooner appts    Yisel Mars on 4/29/2024 at 10:13 AM

## 2024-04-29 NOTE — TELEPHONE ENCOUNTER
Left voicemail for patient letting him know that we do not have any appts sooner and that he is welcome to reach out to his primary to seek a referral outside our clinic or he could try another location with [facility]

## 2024-06-04 ENCOUNTER — MYC MEDICAL ADVICE (OUTPATIENT)
Dept: PEDIATRICS | Facility: CLINIC | Age: 84
End: 2024-06-04
Payer: COMMERCIAL

## 2024-06-04 RX ORDER — GABAPENTIN 100 MG/1
200 CAPSULE ORAL EVERY EVENING
Qty: 60 CAPSULE | Refills: 5 | Status: SHIPPED | OUTPATIENT
Start: 2024-06-04

## 2024-07-22 ENCOUNTER — MYC MEDICAL ADVICE (OUTPATIENT)
Dept: PEDIATRICS | Facility: CLINIC | Age: 84
End: 2024-07-22
Payer: COMMERCIAL

## 2024-07-23 NOTE — TELEPHONE ENCOUNTER
See pt's Superplayer message.     Replied via Superplayer.     Jessica BATISTA RN   [facility]

## 2024-08-06 ENCOUNTER — PATIENT OUTREACH (OUTPATIENT)
Dept: CARE COORDINATION | Facility: CLINIC | Age: 84
End: 2024-08-06
Payer: COMMERCIAL

## 2024-08-16 ENCOUNTER — TRANSFERRED RECORDS (OUTPATIENT)
Dept: HEALTH INFORMATION MANAGEMENT | Facility: CLINIC | Age: 84
End: 2024-08-16
Payer: COMMERCIAL

## 2024-08-20 ENCOUNTER — OFFICE VISIT (OUTPATIENT)
Dept: DERMATOLOGY | Facility: CLINIC | Age: 84
End: 2024-08-20
Attending: INTERNAL MEDICINE
Payer: COMMERCIAL

## 2024-08-20 ENCOUNTER — PATIENT OUTREACH (OUTPATIENT)
Dept: CARE COORDINATION | Facility: CLINIC | Age: 84
End: 2024-08-20

## 2024-08-20 DIAGNOSIS — Z85.820 HISTORY OF MELANOMA: ICD-10-CM

## 2024-08-20 DIAGNOSIS — L57.0 AK (ACTINIC KERATOSIS): ICD-10-CM

## 2024-08-20 DIAGNOSIS — L30.9 DERMATITIS: Primary | ICD-10-CM

## 2024-08-20 PROCEDURE — 99203 OFFICE O/P NEW LOW 30 MIN: CPT | Mod: 25 | Performed by: DERMATOLOGY

## 2024-08-20 PROCEDURE — 17000 DESTRUCT PREMALG LESION: CPT | Performed by: DERMATOLOGY

## 2024-08-20 PROCEDURE — 17003 DESTRUCT PREMALG LES 2-14: CPT | Performed by: DERMATOLOGY

## 2024-08-20 RX ORDER — TRIAMCINOLONE ACETONIDE 0.25 MG/G
OINTMENT TOPICAL
Qty: 60 G | Refills: 1 | Status: SHIPPED | OUTPATIENT
Start: 2024-08-20

## 2024-08-20 NOTE — PROGRESS NOTES
Dermatology Clinic Note    Dermatology Problem List:  1.  History of malignant melanoma on right zygomatic arch, removed by Dr. Junior in approximately the year 2000.  The patient followed with every 3-month skin checks for several years with Dr. Junior and Dr. Cassidy.   2.  Actinic keratoses, treated with cryotherapy.   3.  Seborrheic keratoses.   4.  Cherry angiomas.       Assessment and Plan:  Actinic keratoses: 12 lesion(s) on the bilateral cheeks, forehead, lower cheeks, L dorsal hand treated with a 10 sec freeze/thaw cycle with liquid nitrogen. Wound care info provided. Discussed risks of pain, blistering, scar, incomplete removal. Patient advised to return should lesions fail to resolve.   Seborrheic keratoses: Benign hyperkeratotic papules and plaques. No treatment advised unless irritation occurs.    Benign pigmented nevi: No lesions of concern. Sun protection recommended. Discussed ABCDEs of malignant melanoma.    History of melanoma: No recurrence.   Pruritic eruption on the abdomen: Differential diagnosis of irritant, atopic dermatitis. Recommended triamcinolone ointment 0.025% BID until clear, then as needed.     RTC 1 year, sooner if aks do not resolve.       Thank you for involving me in this patient's care.     Mame Livingston MD   of Dermatology  [facility]    CC: Shayan Dixon MD  [address] [place] [address]    Shayan Dixon MD    ____________________________________________________________________________________________________________________________________________    CC: Patient presents with:  RECHECK: Skin check current concerns left facial cheek and right abdomen area     HPI: Cleveland Long is a 84 year old male presenting for skin check seen at the request of Zack, Shayan E. Patient was last seen in 2017. Since that time he has several scaling areas on the L cheek. Notes an itching rash for a few weeks on the R abdomen.       Patient  Active Problem List   Diagnosis    Solitary pulmonary nodule    Coronary atherosclerosis of native coronary artery    Hyperlipidemia LDL goal <100    Impaired fasting glucose    Left inguinal hernia    Hydrocele, left    Inflamed seborrheic keratosis    AK (actinic keratosis)    History of melanoma    Paroxysmal supraventricular tachycardia (H24)       No Known Allergies      Current Outpatient Medications   Medication Sig Dispense Refill    atorvastatin (LIPITOR) 20 MG tablet Take 20 mg by mouth daily      gabapentin (NEURONTIN) 100 MG capsule Take 2 capsules (200 mg) by mouth every evening 60 capsule 5    metoprolol tartrate (LOPRESSOR) 25 MG tablet Take 25 mg by mouth as needed       No current facility-administered medications for this visit.       EXAM:  There were no vitals taken for this visit.  GEN: Alert, no distress  SKIN:   --Gritty papules on the L lateral cheek, R lateral cheek, temples, forehead, L dorsal hand, bilateral lower cheeks  --Waxy tan papules on the back, chest, arms, legs  --light brown macules on the back, shoulders, chest, face  --Few medium brown macules on the back, arms, legs  --Linear scar on the R temple

## 2024-08-20 NOTE — LETTER
8/20/2024      RE: Cleveland Long  [address] [place] [address]     Dear Colleague,    Thank you for the opportunity to participate in the care of your patient, Cleveland Long, at the [facility] at [facility]. Please see a copy of my visit note below.    Dermatology Clinic Note    Dermatology Problem List:  1.  History of malignant melanoma on right zygomatic arch, removed by Dr. Junior in approximately the year 2000.  The patient followed with every 3-month skin checks for several years with Dr. Junior and Dr. Cassidy.   2.  Actinic keratoses, treated with cryotherapy.   3.  Seborrheic keratoses.   4.  Cherry angiomas.       Assessment and Plan:  Actinic keratoses: 12 lesion(s) on the bilateral cheeks, forehead, lower cheeks, L dorsal hand treated with a 10 sec freeze/thaw cycle with liquid nitrogen. Wound care info provided. Discussed risks of pain, blistering, scar, incomplete removal. Patient advised to return should lesions fail to resolve.   Seborrheic keratoses: Benign hyperkeratotic papules and plaques. No treatment advised unless irritation occurs.    Benign pigmented nevi: No lesions of concern. Sun protection recommended. Discussed ABCDEs of malignant melanoma.    History of melanoma: No recurrence.   Pruritic eruption on the abdomen: Differential diagnosis of irritant, atopic dermatitis. Recommended triamcinolone ointment 0.025% BID until clear, then as needed.     RTC 1 year, sooner if aks do not resolve.       Thank you for involving me in this patient's care.     Mame Livingston MD   of Dermatology  [facility]    CC: Shayan Dixon MD  [address] [address]    Shayan Dixon MD    ____________________________________________________________________________________________________________________________________________    CC: Patient presents with:  RECHECK: Skin  check current concerns left facial cheek and right abdomen area     HPI: Cleveland Long is a 84 year old male presenting for skin check seen at the request of Shayan Dixon Patient was last seen in 2017. Since that time he has several scaling areas on the L cheek. Notes an itching rash for a few weeks on the R abdomen.       Patient Active Problem List   Diagnosis     Solitary pulmonary nodule     Coronary atherosclerosis of native coronary artery     Hyperlipidemia LDL goal <100     Impaired fasting glucose     Left inguinal hernia     Hydrocele, left     Inflamed seborrheic keratosis     AK (actinic keratosis)     History of melanoma     Paroxysmal supraventricular tachycardia (H24)       No Known Allergies      Current Outpatient Medications   Medication Sig Dispense Refill     atorvastatin (LIPITOR) 20 MG tablet Take 20 mg by mouth daily       gabapentin (NEURONTIN) 100 MG capsule Take 2 capsules (200 mg) by mouth every evening 60 capsule 5     metoprolol tartrate (LOPRESSOR) 25 MG tablet Take 25 mg by mouth as needed       No current facility-administered medications for this visit.       EXAM:  There were no vitals taken for this visit.  GEN: Alert, no distress  SKIN:   --Gritty papules on the L lateral cheek, R lateral cheek, temples, forehead, L dorsal hand, bilateral lower cheeks  --Waxy tan papules on the back, chest, arms, legs  --light brown macules on the back, shoulders, chest, face  --Few medium brown macules on the back, arms, legs  --Linear scar on the R temple                    Please do not hesitate to contact me if you have any questions/concerns.     Sincerely,       Mame Livingston MD

## 2024-10-07 ENCOUNTER — OFFICE VISIT (OUTPATIENT)
Dept: PEDIATRICS | Facility: CLINIC | Age: 84
End: 2024-10-07
Payer: COMMERCIAL

## 2024-10-07 VITALS
BODY MASS INDEX: 24.84 KG/M2 | HEART RATE: 68 BPM | TEMPERATURE: 97.9 F | OXYGEN SATURATION: 98 % | WEIGHT: 163.9 LBS | DIASTOLIC BLOOD PRESSURE: 73 MMHG | RESPIRATION RATE: 18 BRPM | HEIGHT: 68 IN | SYSTOLIC BLOOD PRESSURE: 137 MMHG

## 2024-10-07 DIAGNOSIS — E78.5 HYPERLIPIDEMIA LDL GOAL <100: ICD-10-CM

## 2024-10-07 DIAGNOSIS — Z00.00 ROUTINE GENERAL MEDICAL EXAMINATION AT A HEALTH CARE FACILITY: Primary | ICD-10-CM

## 2024-10-07 DIAGNOSIS — I47.10 PAROXYSMAL SUPRAVENTRICULAR TACHYCARDIA (H): ICD-10-CM

## 2024-10-07 LAB
ALBUMIN SERPL BCG-MCNC: 4 G/DL (ref 3.5–5.2)
ALP SERPL-CCNC: 48 U/L (ref 40–150)
ALT SERPL W P-5'-P-CCNC: 24 U/L (ref 0–70)
ANION GAP SERPL CALCULATED.3IONS-SCNC: 10 MMOL/L (ref 7–15)
AST SERPL W P-5'-P-CCNC: 31 U/L (ref 0–45)
BILIRUB SERPL-MCNC: 1.4 MG/DL
BUN SERPL-MCNC: 19 MG/DL (ref 8–23)
CALCIUM SERPL-MCNC: 9.7 MG/DL (ref 8.8–10.4)
CHLORIDE SERPL-SCNC: 106 MMOL/L (ref 98–107)
CHOLEST SERPL-MCNC: 155 MG/DL
CREAT SERPL-MCNC: 1.02 MG/DL (ref 0.67–1.17)
EGFRCR SERPLBLD CKD-EPI 2021: 72 ML/MIN/1.73M2
FASTING STATUS PATIENT QL REPORTED: YES
FASTING STATUS PATIENT QL REPORTED: YES
GLUCOSE SERPL-MCNC: 99 MG/DL (ref 70–99)
HCO3 SERPL-SCNC: 24 MMOL/L (ref 22–29)
HDLC SERPL-MCNC: 69 MG/DL
LDLC SERPL CALC-MCNC: 75 MG/DL
NONHDLC SERPL-MCNC: 86 MG/DL
POTASSIUM SERPL-SCNC: 4.5 MMOL/L (ref 3.4–5.3)
PROT SERPL-MCNC: 6.5 G/DL (ref 6.4–8.3)
SODIUM SERPL-SCNC: 140 MMOL/L (ref 135–145)
TRIGL SERPL-MCNC: 57 MG/DL

## 2024-10-07 PROCEDURE — 36415 COLL VENOUS BLD VENIPUNCTURE: CPT | Performed by: INTERNAL MEDICINE

## 2024-10-07 PROCEDURE — 80053 COMPREHEN METABOLIC PANEL: CPT | Performed by: INTERNAL MEDICINE

## 2024-10-07 PROCEDURE — 80061 LIPID PANEL: CPT | Performed by: INTERNAL MEDICINE

## 2024-10-07 PROCEDURE — 99214 OFFICE O/P EST MOD 30 MIN: CPT | Mod: 25 | Performed by: INTERNAL MEDICINE

## 2024-10-07 PROCEDURE — G0439 PPPS, SUBSEQ VISIT: HCPCS | Performed by: INTERNAL MEDICINE

## 2024-10-07 RX ORDER — METOPROLOL TARTRATE 25 MG/1
25 TABLET, FILM COATED ORAL 2 TIMES DAILY
Qty: 180 TABLET | Refills: 4 | Status: SHIPPED | OUTPATIENT
Start: 2024-10-07

## 2024-10-07 RX ORDER — ATORVASTATIN CALCIUM 20 MG/1
20 TABLET, FILM COATED ORAL DAILY
Qty: 90 TABLET | Refills: 4 | Status: SHIPPED | OUTPATIENT
Start: 2024-10-07

## 2024-10-07 SDOH — HEALTH STABILITY: PHYSICAL HEALTH: ON AVERAGE, HOW MANY DAYS PER WEEK DO YOU ENGAGE IN MODERATE TO STRENUOUS EXERCISE (LIKE A BRISK WALK)?: 3 DAYS

## 2024-10-07 SDOH — HEALTH STABILITY: PHYSICAL HEALTH: ON AVERAGE, HOW MANY MINUTES DO YOU ENGAGE IN EXERCISE AT THIS LEVEL?: 30 MIN

## 2024-10-07 ASSESSMENT — SOCIAL DETERMINANTS OF HEALTH (SDOH)
HOW OFTEN DO YOU GET TOGETHER WITH FRIENDS OR RELATIVES?: THREE TIMES A WEEK
HOW OFTEN DO YOU GET TOGETHER WITH FRIENDS OR RELATIVES?: THREE TIMES A WEEK

## 2024-10-07 NOTE — PROGRESS NOTES
Preventive Care Visit  [facility]  Shayan Dixon MD, Internal Medicine - Pediatrics  Oct 7, 2024      Assessment & Plan       ICD-10-CM    1. Routine general medical examination at a health care facility  Z00.00 metoprolol tartrate (LOPRESSOR) 25 MG tablet     Comprehensive metabolic panel     Lipid panel reflex to direct LDL Fasting      2. Hyperlipidemia LDL goal <100  E78.5 atorvastatin (LIPITOR) 20 MG tablet     Comprehensive metabolic panel     Lipid panel reflex to direct LDL Fasting      3. Paroxysmal supraventricular tachycardia (H)  I47.10 Comprehensive metabolic panel        Here for AWV. Doing well. No acute concerns today. Reviewed HM. Updating fasting labs.     Refilled routine rx. No changes today.     Counseling  Appropriate preventive services were addressed with this patient via screening, questionnaire, or discussion as appropriate for fall prevention, nutrition, physical activity, social engagement, weight loss and cognition.  Checklist reviewing preventive services available has been given to the patient.  Reviewed patient's diet, addressing concerns and/or questions.   He is at risk for lack of exercise and has been provided with information to increase physical activity for the benefit of his well-being.   He is at risk for psychosocial distress and has been provided with information to reduce risk.   Discussed possible causes of fatigue. The patient was provided with written information regarding signs of hearing loss.       Shayan Dixon MD         Subjective   Cy is a 84 year old, presenting for the following:  Physical        10/7/2024     6:59 AM   Additional Questions   Roomed by MA         10/7/2024     6:59 AM   Patient Reported Additional Medications   Patient reports taking the following new medications None     Health Care Directive  Patient does not have a Health Care Directive or Living Will: Discussed advance care planning with patient; however, patient  declined at this time.    HPI  Here for AWV. Feeling well.    PSVT. Metoprolol BID. No cardiac sx such as CP, PND, orthopnea, LOPEZ or peripheral edema. Rare palpitations.     Hyperlipidemia. Well controlled.  Lab Results   Component Value Date    LDL 79 09/05/2023     05/16/2022    LDL 67 04/14/2021     Chronic lower back pain. Intermittent GANGA which help. Last gabapentin use was months ago.           10/7/2024   General Health   How would you rate your overall physical health? Good   Feel stress (tense, anxious, or unable to sleep) Only a little      (!) STRESS CONCERN      10/7/2024   Nutrition   Diet: Regular (no restrictions)            10/7/2024   Exercise   Days per week of moderate/strenous exercise 3 days   Average minutes spent exercising at this level 30 min            10/7/2024   Social Factors   Frequency of gathering with friends or relatives Three times a week   Worry food won't last until get money to buy more No   Food not last or not have enough money for food? No   Do you have housing? (Housing is defined as stable permanent housing and does not include staying ouside in a car, in a tent, in an abandoned building, in an overnight shelter, or couch-surfing.) Yes   Are you worried about losing your housing? No   Lack of transportation? No   Unable to get utilities (heat,electricity)? No            10/7/2024   Fall Risk   Fallen 2 or more times in the past year? No    No    No   Trouble with walking or balance? Yes    No    No   Gait Speed Test (Document in seconds) 4.2   Gait Speed Test Interpretation Less than or equal to 5.00 seconds - PASS       Multiple values from one day are sorted in reverse-chronological order          10/7/2024   Activities of Daily Living- Home Safety   Needs help with the following daily activites None of the above   Safety concerns in the home None of the above            10/7/2024   Dental   Dentist two times every year? Yes            10/7/2024   Hearing Screening    Hearing concerns? (!) IT'S HARD TO FOLLOW A CONVERSATION IN A NOISY RESTAURANT OR CROWDED ROOM.            10/7/2024   Driving Risk Screening   Patient/family members have concerns about driving No            10/7/2024   General Alertness/Fatigue Screening   Have you been more tired than usual lately? (!) YES            10/7/2024   Urinary Incontinence Screening   Bothered by leaking urine in past 6 months No            10/7/2024   TB Screening   Were you born outside of the [place]? No          Today's PHQ-2 Score:       10/7/2024     6:50 AM   PHQ-2 (  [employer])   Q1: Little interest or pleasure in doing things 0   Q2: Feeling down, depressed or hopeless 0   PHQ-2 Score 0   Q1: Little interest or pleasure in doing things Not at all   Q2: Feeling down, depressed or hopeless Not at all   PHQ-2 Score 0           10/7/2024   Substance Use   Alcohol more than 3/day or more than 7/wk No   Do you have a current opioid prescription? No   How severe/bad is pain from 1 to 10? 0/10 (No Pain)   Do you use any other substances recreationally? No        Social History     Tobacco Use    Smoking status: Former     Current packs/day: 0.00     Average packs/day: 0.3 packs/day for 40.0 years (10.0 ttl pk-yrs)     Types: Cigarettes     Start date: 1958     Quit date: 1998     Years since quittin.6     Passive exposure: Past    Smokeless tobacco: Never   Vaping Use    Vaping status: Never Used   Substance Use Topics    Alcohol use: Yes     Comment: average 1 beer/wine 4x's a week    Drug use: No       Current providers sharing in care for this patient include:  Patient Care Team:  Shayan Dixon MD as PCP - General (Internal Medicine)  Shayan Dixon MD as Assigned PCP  Mame Livingston MD as MD (Dermatology)  Mame Livingston MD as Assigned Surgical Provider    The following health maintenance items are reviewed in Epic and correct as of today:  Health Maintenance   Topic Date Due    ANNUAL REVIEW OF   "ORDERS  Never done    ZOSTER IMMUNIZATION (2 of 3) 04/02/2011    INFLUENZA VACCINE (1) 09/01/2024    COVID-19 Vaccine (8 - 2024-25 season) 09/01/2024    MEDICARE ANNUAL WELLNESS VISIT  09/05/2024    LIPID  09/05/2024    DTAP/TDAP/TD IMMUNIZATION (1 - Tdap) 03/13/2025 (Originally 3/14/2015)    FALL RISK ASSESSMENT  10/07/2025    ADVANCE CARE PLANNING  09/05/2028    PHQ-2 (once per calendar year)  Completed    Pneumococcal Vaccine: 65+ Years  Completed    RSV VACCINE  Completed    HPV IMMUNIZATION  Aged Out    MENINGITIS IMMUNIZATION  Aged Out    RSV MONOCLONAL ANTIBODY  Aged Out            Objective    Exam  /73 (BP Location: Right arm, Patient Position: Sitting, Cuff Size: Adult Regular)   Pulse 68   Temp 97.9  F (36.6  C) (Temporal)   Resp 18   Ht 1.725 m (5' 7.91\")   Wt 74.3 kg (163 lb 14.4 oz)   SpO2 98%   BMI 24.98 kg/m     Estimated body mass index is 24.98 kg/m  as calculated from the following:    Height as of this encounter: 1.725 m (5' 7.91\").    Weight as of this encounter: 74.3 kg (163 lb 14.4 oz).    Physical Exam  GENERAL: healthy, alert and no distress  EYES: PERRL, EOMI  HENT: ear canals and TM's normal. No nasal discharge. OP moist.  NECK: no adenopathy  RESP: lungs clear to auscultation - no rales, rhonchi or wheezes  CV: regular rate and rhythm, normal S1 S2, no murmur, no peripheral edema  ABDOMEN: soft, nontender, bowel sounds normal  MS: no gross musculoskeletal defects noted  SKIN: no suspicious lesions or rashes  NEURO: Normal strength and tone  PSYCH: mentation appears normal, affect normal         10/7/2024   Mini Cog   Clock Draw Score 2 Normal   3 Item Recall 3 objects recalled   Mini Cog Total Score 5               Signed Electronically by: Shayan Dixon MD    "

## 2024-10-08 ENCOUNTER — OFFICE VISIT (OUTPATIENT)
Dept: URGENT CARE | Facility: URGENT CARE | Age: 84
End: 2024-10-08
Payer: COMMERCIAL

## 2024-10-08 ENCOUNTER — HOSPITAL ENCOUNTER (EMERGENCY)
Facility: CLINIC | Age: 84
Discharge: HOME | End: 2024-10-08
Attending: STUDENT IN AN ORGANIZED HEALTH CARE EDUCATION/TRAINING PROGRAM | Admitting: STUDENT IN AN ORGANIZED HEALTH CARE EDUCATION/TRAINING PROGRAM
Payer: COMMERCIAL

## 2024-10-08 ENCOUNTER — ORDERS ONLY (AUTO-RELEASED) (OUTPATIENT)
Dept: EMERGENCY MEDICINE | Facility: CLINIC | Age: 84
End: 2024-10-08
Payer: COMMERCIAL

## 2024-10-08 VITALS
HEART RATE: 58 BPM | TEMPERATURE: 97.6 F | SYSTOLIC BLOOD PRESSURE: 120 MMHG | DIASTOLIC BLOOD PRESSURE: 61 MMHG | RESPIRATION RATE: 8 BRPM

## 2024-10-08 VITALS
OXYGEN SATURATION: 97 % | RESPIRATION RATE: 16 BRPM | SYSTOLIC BLOOD PRESSURE: 78 MMHG | DIASTOLIC BLOOD PRESSURE: 55 MMHG | TEMPERATURE: 96.7 F | HEART RATE: 121 BPM

## 2024-10-08 DIAGNOSIS — R42 DIZZINESS: ICD-10-CM

## 2024-10-08 DIAGNOSIS — R00.0 TACHYCARDIA: Primary | ICD-10-CM

## 2024-10-08 DIAGNOSIS — R00.0 TACHYARRHYTHMIA: ICD-10-CM

## 2024-10-08 LAB
ANION GAP SERPL CALCULATED.3IONS-SCNC: 12 MMOL/L (ref 7–15)
ATRIAL RATE - MUSE: 69 BPM
ATRIAL RATE - MUSE: NORMAL BPM
BASOPHILS # BLD AUTO: 0 10E3/UL (ref 0–0.2)
BASOPHILS NFR BLD AUTO: 0 %
BUN SERPL-MCNC: 22.7 MG/DL (ref 8–23)
CALCIUM SERPL-MCNC: 9.6 MG/DL (ref 8.8–10.4)
CHLORIDE SERPL-SCNC: 105 MMOL/L (ref 98–107)
CREAT SERPL-MCNC: 1.01 MG/DL (ref 0.67–1.17)
DIASTOLIC BLOOD PRESSURE - MUSE: NORMAL MMHG
DIASTOLIC BLOOD PRESSURE - MUSE: NORMAL MMHG
EGFRCR SERPLBLD CKD-EPI 2021: 72 ML/MIN/1.73M2
EOSINOPHIL # BLD AUTO: 0.1 10E3/UL (ref 0–0.7)
EOSINOPHIL NFR BLD AUTO: 2 %
ERYTHROCYTE [DISTWIDTH] IN BLOOD BY AUTOMATED COUNT: 12.5 % (ref 10–15)
GLUCOSE SERPL-MCNC: 108 MG/DL (ref 70–99)
HCO3 SERPL-SCNC: 22 MMOL/L (ref 22–29)
HCT VFR BLD AUTO: 44.8 % (ref 40–53)
HGB BLD-MCNC: 14.9 G/DL (ref 13.3–17.7)
IMM GRANULOCYTES # BLD: 0 10E3/UL
IMM GRANULOCYTES NFR BLD: 0 %
INTERPRETATION ECG - MUSE: NORMAL
INTERPRETATION ECG - MUSE: NORMAL
LYMPHOCYTES # BLD AUTO: 1.8 10E3/UL (ref 0.8–5.3)
LYMPHOCYTES NFR BLD AUTO: 23 %
MAGNESIUM SERPL-MCNC: 2 MG/DL (ref 1.7–2.3)
MCH RBC QN AUTO: 31.5 PG (ref 26.5–33)
MCHC RBC AUTO-ENTMCNC: 33.3 G/DL (ref 31.5–36.5)
MCV RBC AUTO: 95 FL (ref 78–100)
MONOCYTES # BLD AUTO: 1.1 10E3/UL (ref 0–1.3)
MONOCYTES NFR BLD AUTO: 13 %
NEUTROPHILS # BLD AUTO: 4.8 10E3/UL (ref 1.6–8.3)
NEUTROPHILS NFR BLD AUTO: 61 %
NRBC # BLD AUTO: 0 10E3/UL
NRBC BLD AUTO-RTO: 0 /100
P AXIS - MUSE: 69 DEGREES
P AXIS - MUSE: NORMAL DEGREES
PLATELET # BLD AUTO: 211 10E3/UL (ref 150–450)
POTASSIUM SERPL-SCNC: 4.2 MMOL/L (ref 3.4–5.3)
PR INTERVAL - MUSE: 234 MS
PR INTERVAL - MUSE: NORMAL MS
QRS DURATION - MUSE: 126 MS
QRS DURATION - MUSE: 128 MS
QT - MUSE: 350 MS
QT - MUSE: 406 MS
QTC - MUSE: 435 MS
QTC - MUSE: 482 MS
R AXIS - MUSE: -14 DEGREES
R AXIS - MUSE: -35 DEGREES
RBC # BLD AUTO: 4.73 10E6/UL (ref 4.4–5.9)
SODIUM SERPL-SCNC: 139 MMOL/L (ref 135–145)
SYSTOLIC BLOOD PRESSURE - MUSE: NORMAL MMHG
SYSTOLIC BLOOD PRESSURE - MUSE: NORMAL MMHG
T AXIS - MUSE: 50 DEGREES
T AXIS - MUSE: 61 DEGREES
T4 FREE SERPL-MCNC: 1.03 NG/DL (ref 0.9–1.7)
TSH SERPL DL<=0.005 MIU/L-ACNC: 4.8 UIU/ML (ref 0.3–4.2)
VENTRICULAR RATE- MUSE: 114 BPM
VENTRICULAR RATE- MUSE: 69 BPM
WBC # BLD AUTO: 7.9 10E3/UL (ref 4–11)

## 2024-10-08 PROCEDURE — 85025 COMPLETE CBC W/AUTO DIFF WBC: CPT | Performed by: STUDENT IN AN ORGANIZED HEALTH CARE EDUCATION/TRAINING PROGRAM

## 2024-10-08 PROCEDURE — 99214 OFFICE O/P EST MOD 30 MIN: CPT | Performed by: PHYSICIAN ASSISTANT

## 2024-10-08 PROCEDURE — 96361 HYDRATE IV INFUSION ADD-ON: CPT

## 2024-10-08 PROCEDURE — 84443 ASSAY THYROID STIM HORMONE: CPT | Performed by: STUDENT IN AN ORGANIZED HEALTH CARE EDUCATION/TRAINING PROGRAM

## 2024-10-08 PROCEDURE — 83735 ASSAY OF MAGNESIUM: CPT | Performed by: STUDENT IN AN ORGANIZED HEALTH CARE EDUCATION/TRAINING PROGRAM

## 2024-10-08 PROCEDURE — 80048 BASIC METABOLIC PNL TOTAL CA: CPT | Performed by: STUDENT IN AN ORGANIZED HEALTH CARE EDUCATION/TRAINING PROGRAM

## 2024-10-08 PROCEDURE — 93005 ELECTROCARDIOGRAM TRACING: CPT

## 2024-10-08 PROCEDURE — 99284 EMERGENCY DEPT VISIT MOD MDM: CPT

## 2024-10-08 PROCEDURE — 82310 ASSAY OF CALCIUM: CPT | Performed by: STUDENT IN AN ORGANIZED HEALTH CARE EDUCATION/TRAINING PROGRAM

## 2024-10-08 PROCEDURE — 93000 ELECTROCARDIOGRAM COMPLETE: CPT | Performed by: PHYSICIAN ASSISTANT

## 2024-10-08 PROCEDURE — 258N000003 HC RX IP 258 OP 636: Performed by: STUDENT IN AN ORGANIZED HEALTH CARE EDUCATION/TRAINING PROGRAM

## 2024-10-08 PROCEDURE — 96360 HYDRATION IV INFUSION INIT: CPT

## 2024-10-08 PROCEDURE — 84439 ASSAY OF FREE THYROXINE: CPT | Performed by: STUDENT IN AN ORGANIZED HEALTH CARE EDUCATION/TRAINING PROGRAM

## 2024-10-08 PROCEDURE — 36415 COLL VENOUS BLD VENIPUNCTURE: CPT | Performed by: STUDENT IN AN ORGANIZED HEALTH CARE EDUCATION/TRAINING PROGRAM

## 2024-10-08 RX ADMIN — SODIUM CHLORIDE 1000 ML: 9 INJECTION, SOLUTION INTRAVENOUS at 12:17

## 2024-10-08 ASSESSMENT — ACTIVITIES OF DAILY LIVING (ADL)
ADLS_ACUITY_SCORE: 35

## 2024-10-08 ASSESSMENT — COLUMBIA-SUICIDE SEVERITY RATING SCALE - C-SSRS
6. HAVE YOU EVER DONE ANYTHING, STARTED TO DO ANYTHING, OR PREPARED TO DO ANYTHING TO END YOUR LIFE?: NO
1. IN THE PAST MONTH, HAVE YOU WISHED YOU WERE DEAD OR WISHED YOU COULD GO TO SLEEP AND NOT WAKE UP?: NO
2. HAVE YOU ACTUALLY HAD ANY THOUGHTS OF KILLING YOURSELF IN THE PAST MONTH?: NO

## 2024-10-08 NOTE — PROGRESS NOTES
Assessment & Plan     Tachycardia  Tachycardia is not responding to metoprolol and his blood pressure is down to 78/55 with dizziness. I advised he go to the emergency room for treatment. He understands and agrees to the plan. He will ride with his daughter to the ER.     - EKG 12-lead complete w/read - Clinics    Dizziness  As above    Return To ER today.    Subjective   C[place] is a 84 year old, presenting for the following health issues: He presents with tachycardia. He has a  history of same. EKG revealed tachycardia and bbb, both of which he has had in the past. Today, he took a Metoprolol this am after he felt palpitations. Heart rate was in the 130s at the time. The metoprolol did not bring his pulse down, so he called his cardiologist. He thought he just needed an EKG, so he came here. He is dizzy, but otherwise feels fine.           10/7/2024     6:59 AM   Additional Questions   Roomed by DC     HPI       Review of Systems  Constitutional, HEENT, cardiovascular, pulmonary, gi and gu systems are negative, except as otherwise noted.      Objective    BP (!) 78/55   Pulse (!) 121   Temp (!) 96.7  F (35.9  C)   Resp 16   SpO2 97%   There is no height or weight on file to calculate BMI.  Physical Exam   GENERAL: alert and no distress  CV: tachycardia, peripheral pulses strong, and no peripheral edema  SKIN: no suspicious lesions or rashes  PSYCH: mentation appears normal, affect normal/bright    EKG - Reviewed and interpreted by me sinus tachycardia, Right Bundle Branch Block  No results found for this or any previous visit (from the past 24 hour(s)).        Signed Electronically by: Zoe Tanner PA-C

## 2024-10-08 NOTE — DISCHARGE INSTRUCTIONS
Return to the emergency department if symptoms are worsening, become concerning, or for any other concerns. Follow-up with your doctor or cardiologist within 1 week.

## 2024-10-08 NOTE — ED TRIAGE NOTES
Fast heart since this am , 3-4 of these episodes this year. Sent here from dr office     Triage Assessment (Adult)       Row Name 10/08/24 1220          Triage Assessment    Airway WDL WDL        Respiratory WDL    Respiratory WDL WDL        Skin Circulation/Temperature WDL    Skin Circulation/Temperature WDL WDL        Cardiac WDL    Cardiac WDL X;rhythm     Pulse Rate & Regularity tachycardic     Cardiac Rhythm ST;Heart block        Peripheral/Neurovascular WDL    Peripheral Neurovascular WDL WDL        Cognitive/Neuro/Behavioral WDL    Cognitive/Neuro/Behavioral WDL WDL

## 2024-10-08 NOTE — ED PROVIDER NOTES
Emergency Department Note      History of Present Illness     Chief Complaint   Tachycardia      HPI   Oz Long is a 84 year old male history of paroxysmal SVT, presents with concerns of tachycardia.  Patient states he has had this multiple times in the past.  He woke up and his heart rate was beating faster than normal and he felt slightly lightheaded so he took his beta-blocker.  Afterwards his blood pressure lowered.  He has no chest pain, shortness breath, abdominal pain, nausea, vomiting, fever, or diarrhea.  At rest, lying in the gurney he feels well.  Was seen in urgent care and then sent here.    Independent Historian   None    Review of External Notes   Urgent care note from today for tachycardia and hypotension.  April 8, 2024-office visit-paroxysmal SVT.  Past Medical History     Medical History and Problem List   Past Medical History:   Diagnosis Date    Arthritis 1990    Cancer (H) 2007    Diverticulitis of colon 01/01/2011    Heart disease 2007       Medications   atorvastatin (LIPITOR) 20 MG tablet  gabapentin (NEURONTIN) 100 MG capsule  metoprolol tartrate (LOPRESSOR) 25 MG tablet  triamcinolone (KENALOG) 0.025 % external ointment        Surgical History   Past Surgical History:   Procedure Laterality Date    APPENDECTOMY      CATARACT IOL, RT/LT      bilateral    COLONOSCOPY  2014    Last exam    GENITOURINARY SURGERY      repair of testicular hematoma    HERNIA REPAIR      bilateral inguinal    IR LUMBAR PUNCTURE  3/24/2023    IR LUMBAR PUNCTURE  4/21/2023       Physical Exam     Patient Vitals for the past 24 hrs:   BP Temp Temp src Pulse Resp   10/08/24 1430 -- -- -- 58 (!) 8   10/08/24 1415 -- -- -- 65 13   10/08/24 1400 -- -- -- 61 23   10/08/24 1345 -- -- -- 73 (!) 6   10/08/24 1330 120/61 -- -- 65 (!) 9   10/08/24 1315 115/63 -- -- 65 (!) 8   10/08/24 1300 114/63 -- -- 69 (!) 8   10/08/24 1245 114/62 -- -- 70 (!) 7   10/08/24 1230 (!) 85/60 -- -- 112 (!) 6   10/08/24 1220 -- -- --  111 11   10/08/24 1219 -- 97.6  F (36.4  C) Oral -- --   10/08/24 1202 -- -- -- 113 12   10/08/24 1200 96/73 -- -- 114 18     Physical Exam  GENERAL: Patient well-appearing  HEAD: Atraumatic.  NECK: No rigidity  CV: Tachycardic and regular, no murmurs, rubs or gallops  PULM: CTAB with good aeration; no retractions, rales, rhonchi, or wheezing  ABD: Soft, nontender, nondistended, no guarding  DERM: No rash. Skin warm and dry  EXTREMITY: Moving all extremities without difficulty. No calf tenderness or peripheral edema  VASCULAR: Symmetric pulses bilaterally      Diagnostics     Lab Results   Labs Ordered and Resulted from Time of ED Arrival to Time of ED Departure   BASIC METABOLIC PANEL - Abnormal       Result Value    Sodium 139      Potassium 4.2      Chloride 105      Carbon Dioxide (CO2) 22      Anion Gap 12      Urea Nitrogen 22.7      Creatinine 1.01      GFR Estimate 72      Calcium 9.6      Glucose 108 (*)    TSH WITH FREE T4 REFLEX - Abnormal    TSH 4.80 (*)    MAGNESIUM - Normal    Magnesium 2.0     T4 FREE - Normal    Free T4 1.03     CBC WITH PLATELETS AND DIFFERENTIAL    WBC Count 7.9      RBC Count 4.73      Hemoglobin 14.9      Hematocrit 44.8      MCV 95      MCH 31.5      MCHC 33.3      RDW 12.5      Platelet Count 211      % Neutrophils 61      % Lymphocytes 23      % Monocytes 13      % Eosinophils 2      % Basophils 0      % Immature Granulocytes 0      NRBCs per 100 WBC 0      Absolute Neutrophils 4.8      Absolute Lymphocytes 1.8      Absolute Monocytes 1.1      Absolute Eosinophils 0.1      Absolute Basophils 0.0      Absolute Immature Granulocytes 0.0      Absolute NRBCs 0.0         Imaging   No orders to display       EKG   ECG interpreted by me.  Time 1142  Regular wide-complex rhythm rate 114.  Left axis deviation.  Right bundle branch block.  No STEMI.  Morphology of the ECG appears similar to prior ECG done earlier today at the clinic.  Also unchanged morphology from March 3,  2024.    Repeat ECG 1245  Normal sinus rhythm rate 69.  First-degree AV block.  Right bundle branch block.  .  .  QTc 435.  No STEMI.    Independent Interpretation   None    ED Course      Medications Administered   Medications   sodium chloride 0.9% BOLUS 1,000 mL (0 mLs Intravenous Stopped 10/8/24 1435)       Procedures   Procedures     Discussion of Management   Discussed with Dr. Jimenez in cardiology.  ED Course        Additional Documentation  None    Medical Decision Making / Diagnosis     CMS Diagnoses: None    MIPS       None    [facility]   Oz Long is a 84 year old male     Patient presents with concerns of tachycardia.  Differential diagnosis-considered V. tach, SVT, metabolic issue, and others.  Chronic conditions complicating-history of paroxysmal SVT.  Initial ECG, rate is slightly tachycardic and regular.  It is a wide-complex, but the morphology is unchanged from his baseline right bundle branch block.  This does not appear to be V. tach.  Does not appear consistent with slow V. tach.  I gave patient IV fluids and checked labs.  Labs overall reassuring.  Patient was kept on the monitor.  He then spontaneously converted to a normal sinus rhythm.  Discussed with cardiology Dr. Jimenez regarding the initial ECG.  We agree this does not appear to be a slow V. tach.  It appears most consistent with a more benign rhythm, potentially an a flutter.  Ultimately, with them now rate controlled, discussed with cardiology, we will order a Zio patch and have patient follow-up with his cardiologist this coming week.  Patient feeling well and ready to go home.  I have evaluated the patient for acute medical emergencies and have clinically decided no further acute medical interventions are required. Reassessed multiple times and improving. Patient stable for discharge. All questions answered. Given strict return precautions. Patient content with plan. The differential diagnosis and treatment  modalities were discussed thoroughly with the patient.          Disposition   The patient was discharged.     Diagnosis     ICD-10-CM    1. Tachyarrhythmia  R00.0 ZIO PATCH MAIL OUT           Discharge Medications   Discharge Medication List as of 10/8/2024  2:36 PM            MD Caity Delcid Kevin, MD  10/08/24 3349

## 2024-10-09 ENCOUNTER — PATIENT OUTREACH (OUTPATIENT)
Dept: PEDIATRICS | Facility: CLINIC | Age: 84
End: 2024-10-09
Payer: COMMERCIAL

## 2024-10-09 NOTE — TELEPHONE ENCOUNTER
Call patient for hospital follow up.  Most Recent Admission Date: 10/8/2024   Most Recent Admission Diagnosis:      Most Recent Discharge Date: 10/8/2024   Most Recent Discharge Diagnosis: Tachyarrhythmia - R00.0     Med changes: none    After Visit Summary follow up recommendations: MARI ANAYA MAIL OUT     Primary care appointment needed within 30days    Primary care hospital follow up appointment has not been made.    Zoe Walls RN

## 2024-10-09 NOTE — TELEPHONE ENCOUNTER
"  Transitions of Care Outreach  Chief Complaint   Patient presents with    Hospital F/U     TCM 30d       Most Recent Admission Date: 10/8/2024   Most Recent Admission Diagnosis:      Most Recent Discharge Date: 10/8/2024   Most Recent Discharge Diagnosis: Tachyarrhythmia - R00.0     Transitions of Care Assessment    Discharge Assessment  How are you doing now that you are home?: \"better. They didn't find anything\"  How are your symptoms? (Red Flag symptoms escalate to triage hotline per guidelines): Improved  Do you know how to contact your clinic care team if you have future questions or changes to your health status? : Yes  Does the patient have their discharge instructions? : Yes  Does the patient have questions regarding their discharge instructions? : No  Were you started on any new medications or were there changes to any of your previous medications? : No  Does the patient have all of their medications?: Yes  Do you have questions regarding any of your medications? : No    Follow up Plan     Discharge Follow-Up  Discharge follow up appointment scheduled in alignment with recommended follow up timeframe or Transitions of Risk Category? (Low = within 30 days; Moderate= within 14 days; High= within 7 days): Yes  Discharge Follow Up Appointment Date: 10/14/24  Discharge Follow Up Appointment Scheduled with?: Primary Care Provider    Future Appointments   Date Time Provider Department Center   10/14/2024  9:00 AM [facility]   8/26/2025  9:30 AM [facility]       Outpatient Plan as outlined on AVS reviewed with patient.    For any urgent concerns, please contact our 24 hour nurse triage line: [phone] ([phone])       Zoe Walls RN     "

## 2024-10-14 ENCOUNTER — OFFICE VISIT (OUTPATIENT)
Dept: PEDIATRICS | Facility: CLINIC | Age: 84
End: 2024-10-14
Payer: COMMERCIAL

## 2024-10-14 VITALS
WEIGHT: 166.5 LBS | OXYGEN SATURATION: 97 % | RESPIRATION RATE: 18 BRPM | HEIGHT: 68 IN | BODY MASS INDEX: 25.23 KG/M2 | SYSTOLIC BLOOD PRESSURE: 110 MMHG | DIASTOLIC BLOOD PRESSURE: 65 MMHG | TEMPERATURE: 96.6 F | HEART RATE: 61 BPM

## 2024-10-14 DIAGNOSIS — I47.10 PAROXYSMAL SUPRAVENTRICULAR TACHYCARDIA (H): Primary | ICD-10-CM

## 2024-10-14 PROCEDURE — 99213 OFFICE O/P EST LOW 20 MIN: CPT | Performed by: INTERNAL MEDICINE

## 2024-10-14 ASSESSMENT — PAIN SCALES - GENERAL: PAINLEVEL: NO PAIN (0)

## 2024-10-14 NOTE — PROGRESS NOTES
"  Assessment & Plan       ICD-10-CM    1. Paroxysmal supraventricular tachycardia (H)  I47.10         Recent episode of what seems to be PSVT.  HR is now normal.  Reviewed med options with metoprolol. No baseline changes today.  He will continue to check his BP in the morning and hold if BP is low.   If he develops tachycardia, he will take a metoprolol full dose. Keep pill in pocket. If this does not resolve after 30-60 minutes then should return to the ED.         MED REC REQUIRED  Post Medication Reconciliation Status:  Discharge medications reconciled, continue medications without change    Shayan Dixon MD      Subjective   Cy is a 84 year old, presenting for the following health issues:  ER F/U      10/14/2024     8:47 AM   Additional Questions   Roomed by KEON VF   Accompanied by Katty         10/14/2024     8:47 AM   Patient Reported Additional Medications   Patient reports taking the following new medications no     HPI       ED/UC Followup:    Facility:  [facility] Emergency Dept   Date of visit: 10/08/24  Reason for visit: Tachyarrhythmia   Current Status: Good    Had an episode of tachycardia last week.  Evaluated initially in UC then ED.  [place] likely PSVT.  HR eventually went back into the normal range.    Takes metoprolol 25 mg BID for BP and HR control.  That am, he skipped the dose d/t SBP in the 90's.     He did not take a dose this am either. HR today is normal.           Objective    /65 (BP Location: Right arm, Patient Position: Sitting, Cuff Size: Adult Large)   Pulse 61   Temp (!) 96.6  F (35.9  C) (Temporal)   Resp 18   Ht 1.725 m (5' 7.91\")   Wt 75.5 kg (166 lb 8 oz)   SpO2 97%   BMI 25.38 kg/m    Body mass index is 25.38 kg/m .  Physical Exam   GEN: no distress  SKIN: no rashes  CV: Regular rate and rhythm.  No murmur. Pulses 2+ radial.  LUNGS: Clear to auscultation bilaterally. No rhonchi, rales, wheezes or retractions.             Signed Electronically by: Shayan ZHENG" MD Zack

## 2024-10-23 ENCOUNTER — MYC MEDICAL ADVICE (OUTPATIENT)
Dept: PEDIATRICS | Facility: CLINIC | Age: 84
End: 2024-10-23
Payer: COMMERCIAL

## 2024-10-23 DIAGNOSIS — I47.10 PAROXYSMAL SUPRAVENTRICULAR TACHYCARDIA (H): Primary | ICD-10-CM

## 2024-10-23 DIAGNOSIS — R00.0 TACHYARRHYTHMIA: ICD-10-CM

## 2024-10-23 NOTE — TELEPHONE ENCOUNTER
See the [facility] message from pt. Please look for the ziopatch result & recommend a cardiologist in our group. Thanks.     Josseline Bhatt RN  [facility]

## 2024-10-27 PROCEDURE — 93244 EXT ECG>48HR<7D REV&INTERPJ: CPT | Performed by: INTERNAL MEDICINE

## 2024-12-12 ENCOUNTER — OFFICE VISIT (OUTPATIENT)
Dept: CARDIOLOGY | Facility: CLINIC | Age: 84
End: 2024-12-12
Payer: COMMERCIAL

## 2024-12-12 VITALS
HEART RATE: 56 BPM | DIASTOLIC BLOOD PRESSURE: 60 MMHG | BODY MASS INDEX: 25.75 KG/M2 | WEIGHT: 164.1 LBS | SYSTOLIC BLOOD PRESSURE: 118 MMHG | HEIGHT: 67 IN | OXYGEN SATURATION: 98 %

## 2024-12-12 DIAGNOSIS — I47.10 PAROXYSMAL SUPRAVENTRICULAR TACHYCARDIA (H): ICD-10-CM

## 2024-12-12 NOTE — PROGRESS NOTES
HPI and Plan:   Oz Long is a 84 year old male who presents with history of tachycardia, SVT and hypotension.  He has been seen by a cardiologist through the [facility] and prescribed low-dose metoprolol for his SVT.  He was in the emergency department in March and then again in October for symptomatic SVT associated with hypotension.  He has noted taking daily low-dose metoprolol causes blood pressure to drop at times in the high 70s to low 80s systolic.  He continues to have daily tachycardia symptoms that can last hours at a time.  In March she was hospitalized due to the hypotension associated with SVT and underwent cardiac testing including troponins which were slightly elevated at 34 and 31.  This prompted ischemic testing with a nuclear stress test which was negative.  Thyroid testing at that time was normal and this was repeated again in October and was normal.  I do not see that he had an echocardiogram done.  In October he had a Zio patch monitor placed for 6 days which showed frequent episodes of SVT the longest being 4 hours in duration at a heart rate of around 144 bpm.  Sounds like he has been taught vagal maneuvers that have been helpful at times and disrupting his tachycardia.  Once again the major complicating factor to treatment has been hypotension.  He has known right bundle branch block that has been chronic for years.  However the tachycardia has just started within the last 1 to 2 years.  ECG in October shows sinus rhythm with first-degree AV block, right bundle branch block and normal QTc at 435 ms.    Exam today reveals regular rhythm with distant heart tones and frequent ectopy, no murmur    Summary    1.  Symptomatic SVT-this is complicated by occasional hypotension.  I discussed management options with him today including follow-up with electrophysiology and consideration of ablation procedure.  He would like to follow-up with EP to discuss this further.  In the meantime I have  recommended that he stop taking metoprolol on a regular basis.  With the onset of tachycardia, first try a vagal maneuver which I have instructed him on several different types.  If the vagal maneuver fails I want him to grab an electrolyte water such as Gatorade and take a full tablet of metoprolol and go lay down.  I have instructed him to drink the full bottle of Gatorade to help avoid hypotension with taking metoprolol.  I have also asked him to increase the sodium in his diet to help with his blood pressure as well.  Lastly I would like him to undergo an echocardiogram to look for any structural abnormalities that may predispose him to atrial arrhythmias.    Please feel free to contact me with any questions given regards to his care    Today's clinic visit entailed:  Review of external notes as documented elsewhere in note  Review of the result(s) of each unique test - ziopatch, nuclear stress, troponin, TSH, BMP  Ordering of each unique test  Prescription drug management    Provider  Link to [facility] Help Grid     The level of medical decision making during this visit was of moderate complexity.    Orders Placed This Encounter   Procedures    Follow-Up with Cardiology EP    Echocardiogram Complete    Overnight Oximetry Order for DME - ONLY FOR DME     No orders of the defined types were placed in this encounter.    There are no discontinued medications.      Encounter Diagnosis   Name Primary?    Paroxysmal supraventricular tachycardia (H)        CURRENT MEDICATIONS:  Current Outpatient Medications   Medication Sig Dispense Refill    atorvastatin (LIPITOR) 20 MG tablet Take 1 tablet (20 mg) by mouth daily. 90 tablet 4    metoprolol tartrate (LOPRESSOR) 25 MG tablet Take 1 tablet (25 mg) by mouth 2 times daily. 180 tablet 4    triamcinolone (KENALOG) 0.025 % external ointment Twice daily to rash on the abdomen until clear, then as needed. 60g=1 month. Please call patient to notify when prescription is ready. 60 g 1     gabapentin (NEURONTIN) 100 MG capsule Take 2 capsules (200 mg) by mouth every evening (Patient not taking: Reported on 2024) 60 capsule 5       ALLERGIES   No Known Allergies    PAST MEDICAL HISTORY:  Past Medical History:   Diagnosis Date    Arthritis     increasing symptoms    Cancer (H) 2007    Melanoma excised from right facial cheek    Diverticulitis of colon 2011    Heart disease 2007    Plac: taking low dose generic Lipitor       PAST SURGICAL HISTORY:  Past Surgical History:   Procedure Laterality Date    APPENDECTOMY      CATARACT IOL, RT/LT      bilateral    COLONOSCOPY      Last exam    GENITOURINARY SURGERY      repair of testicular hematoma    HERNIA REPAIR      bilateral inguinal    IR LUMBAR PUNCTURE  3/24/2023    IR LUMBAR PUNCTURE  2023       FAMILY HISTORY:  Family History   Problem Relation Age of Onset    C.A.D. Father     Cardiovascular Father          in OR due to carotid endarterectomy    Hypertension Brother     Diabetes Brother     Hyperlipidemia Brother     Lipids Brother     Other Cancer Brother     Thyroid Disease Brother         hypothyroidism    Blood Disease Brother         pulmonary embolism    Diabetes Maternal Grandmother     Diabetes Sister     Coronary Artery Disease Sister     Hypertension Sister     Thyroid Disease Sister        SOCIAL HISTORY:  Social History     Socioeconomic History    Marital status:      Spouse name: None    Number of children: None    Years of education: None    Highest education level: None   Tobacco Use    Smoking status: Former     Current packs/day: 0.00     Average packs/day: 0.3 packs/day for 40.0 years (10.0 ttl pk-yrs)     Types: Cigarettes     Start date: 1958     Quit date: 1998     Years since quittin.8     Passive exposure: Past    Smokeless tobacco: Never   Vaping Use    Vaping status: Never Used   Substance and Sexual Activity    Alcohol use: Yes     Comment: average 1 beer/wine 4x's a week     Drug use: No    Sexual activity: Not Currently     Partners: Female     Birth control/protection: Male Surgical   Other Topics Concern    Parent/sibling w/ CABG, MI or angioplasty before 65F 55M? No     Social Drivers of Health     Financial Resource Strain: Low Risk  (10/7/2024)    Financial Resource Strain     Within the past 12 months, have you or your family members you live with been unable to get utilities (heat, electricity) when it was really needed?: No   Food Insecurity: Low Risk  (10/7/2024)    Food Insecurity     Within the past 12 months, did you worry that your food would run out before you got money to buy more?: No     Within the past 12 months, did the food you bought just not last and you didn t have money to get more?: No   Transportation Needs: Low Risk  (10/7/2024)    Transportation Needs     Within the past 12 months, has lack of transportation kept you from medical appointments, getting your medicines, non-medical meetings or appointments, work, or from getting things that you need?: No   Physical Activity: Insufficiently Active (10/7/2024)    Exercise Vital Sign     Days of Exercise per Week: 3 days     Minutes of Exercise per Session: 30 min   Stress: No Stress Concern Present (10/7/2024)    [employer] - Occupational Stress Questionnaire     Feeling of Stress : Only a little   Social Connections: Unknown (10/7/2024)    Social Connection and Isolation Panel [NHANES]     Frequency of Social Gatherings with Friends and Family: Three times a week   Interpersonal Safety: Low Risk  (10/7/2024)    Interpersonal Safety     Do you feel physically and emotionally safe where you currently live?: Yes     Within the past 12 months, have you been hit, slapped, kicked or otherwise physically hurt by someone?: No     Within the past 12 months, have you been humiliated or emotionally abused in other ways by your partner or ex-partner?: No   Housing Stability: Low Risk   "(10/7/2024)    Housing Stability     Do you have housing? : Yes     Are you worried about losing your housing?: No       Review of Systems:  Skin:        Eyes:       ENT:       Respiratory:       Cardiovascular:       Gastroenterology:      Genitourinary:       Musculoskeletal:       Neurologic:       Psychiatric:       Heme/Lymph/Imm:       Endocrine:         Physical Exam:  Vitals: /60 (BP Location: Right arm, Patient Position: Sitting, Cuff Size: Adult Regular)   Pulse 56   Ht 1.702 m (5' 7\")   Wt 74.4 kg (164 lb 1.6 oz)   SpO2 98%   BMI 25.70 kg/m      Constitutional:  cooperative, in no acute distress        Skin:  warm and dry to the touch          Head:  normocephalic        Eyes:  pupils equal and round        Lymph:      ENT:  no pallor or cyanosis        Neck:  no carotid bruit        Respiratory:  clear to auscultation         Cardiac: regular rhythm frequent premature beats distant heart sounds              pulses below the femoral arteries are diminished                                      GI:  abdomen soft        Extremities and Muscular Skeletal:  no deformities, clubbing, cyanosis, erythema observed, no edema              Neurological:  no gross motor deficits, affect appropriate        Psych:  Alert and Oriented x 3        Recent Lab Results:  LIPID RESULTS:  Lab Results   Component Value Date    CHOL 155 10/07/2024    CHOL 155 04/14/2021    HDL 69 10/07/2024    HDL 70 04/14/2021    LDL 75 10/07/2024    LDL 67 04/14/2021    TRIG 57 10/07/2024    TRIG 92 04/14/2021    CHOLHDLRATIO 1.7 03/13/2015       LIVER ENZYME RESULTS:  Lab Results   Component Value Date    AST 31 10/07/2024    AST 23 04/14/2021    ALT 24 10/07/2024    ALT 35 04/14/2021       CBC RESULTS:  Lab Results   Component Value Date    WBC 7.9 10/08/2024    WBC 8.1 02/07/2020    RBC 4.73 10/08/2024    RBC 4.72 02/07/2020    HGB 14.9 10/08/2024    HGB 15.0 02/07/2020    HCT 44.8 10/08/2024    HCT 44.5 02/07/2020    MCV 95 " "10/08/2024    MCV 94 02/07/2020    MCH 31.5 10/08/2024    MCH 31.8 02/07/2020    MCHC 33.3 10/08/2024    MCHC 33.7 02/07/2020    RDW 12.5 10/08/2024    RDW 12.9 02/07/2020     10/08/2024     02/07/2020       BMP RESULTS:  Lab Results   Component Value Date     10/08/2024     04/14/2021    POTASSIUM 4.2 10/08/2024    POTASSIUM 4.4 05/16/2022    POTASSIUM 4.0 04/14/2021    CHLORIDE 105 10/08/2024    CHLORIDE 109 05/16/2022    CHLORIDE 108 04/14/2021    CO2 22 10/08/2024    CO2 23 05/16/2022    CO2 24 04/14/2021    ANIONGAP 12 10/08/2024    ANIONGAP 8 05/16/2022    ANIONGAP 6 04/14/2021     (H) 10/08/2024    GLC 85 05/16/2022    GLC 89 04/14/2021    BUN 22.7 10/08/2024    BUN 17 05/16/2022    BUN 16 04/14/2021    CR 1.01 10/08/2024    CR 0.98 04/14/2021    GFRESTIMATED 72 10/08/2024    GFRESTIMATED 71 04/14/2021    GFRESTBLACK 83 04/14/2021    ANGELINA 9.6 10/08/2024    ANGELINA 9.4 04/14/2021        A1C RESULTS:  Lab Results   Component Value Date    A1C 5.8 03/11/2014       INR RESULTS:  No results found for: \"INR\"        CC  Referred Self, MD  No address on file                "

## 2024-12-12 NOTE — LETTER
12/12/2024    Shayan Dixon MD  [address] [place] [address]    RE: Oz Long       Dear Colleague,     I had the pleasure of seeing Oz Long in the [facility].  HPI and Plan:   Oz Long is a 84 year old male who presents with history of tachycardia, SVT and hypotension.  He has been seen by a cardiologist through the [facility] system and prescribed low-dose metoprolol for his SVT.  He was in the emergency department in March and then again in October for symptomatic SVT associated with hypotension.  He has noted taking daily low-dose metoprolol causes blood pressure to drop at times in the high 70s to low 80s systolic.  He continues to have daily tachycardia symptoms that can last hours at a time.  In March she was hospitalized due to the hypotension associated with SVT and underwent cardiac testing including troponins which were slightly elevated at 34 and 31.  This prompted ischemic testing with a nuclear stress test which was negative.  Thyroid testing at that time was normal and this was repeated again in October and was normal.  I do not see that he had an echocardiogram done.  In October he had a Zio patch monitor placed for 6 days which showed frequent episodes of SVT the longest being 4 hours in duration at a heart rate of around 144 bpm.  Sounds like he has been taught vagal maneuvers that have been helpful at times and disrupting his tachycardia.  Once again the major complicating factor to treatment has been hypotension.  He has known right bundle branch block that has been chronic for years.  However the tachycardia has just started within the last 1 to 2 years.  ECG in October shows sinus rhythm with first-degree AV block, right bundle branch block and normal QTc at 435 ms.    Exam today reveals regular rhythm with distant heart tones and frequent ectopy, no murmur    Summary    1.  Symptomatic SVT-this is complicated by occasional hypotension.  I  discussed management options with him today including follow-up with electrophysiology and consideration of ablation procedure.  He would like to follow-up with EP to discuss this further.  In the meantime I have recommended that he stop taking metoprolol on a regular basis.  With the onset of tachycardia, first try a vagal maneuver which I have instructed him on several different types.  If the vagal maneuver fails I want him to grab an electrolyte water such as Gatorade and take a full tablet of metoprolol and go lay down.  I have instructed him to drink the full bottle of Gatorade to help avoid hypotension with taking metoprolol.  I have also asked him to increase the sodium in his diet to help with his blood pressure as well.  Lastly I would like him to undergo an echocardiogram to look for any structural abnormalities that may predispose him to atrial arrhythmias.    Please feel free to contact me with any questions given regards to his care    Today's clinic visit entailed:  Review of external notes as documented elsewhere in note  Review of the result(s) of each unique test - ziopatch, nuclear stress, troponin, TSH, BMP  Ordering of each unique test  Prescription drug management    Provider  Link to [facility] Help Grid     The level of medical decision making during this visit was of moderate complexity.    Orders Placed This Encounter   Procedures     Follow-Up with Cardiology EP     Echocardiogram Complete     Overnight Oximetry Order for DME - ONLY FOR DME     No orders of the defined types were placed in this encounter.    There are no discontinued medications.      Encounter Diagnosis   Name Primary?     Paroxysmal supraventricular tachycardia (H)        CURRENT MEDICATIONS:  Current Outpatient Medications   Medication Sig Dispense Refill     atorvastatin (LIPITOR) 20 MG tablet Take 1 tablet (20 mg) by mouth daily. 90 tablet 4     metoprolol tartrate (LOPRESSOR) 25 MG tablet Take 1 tablet (25 mg) by mouth 2  times daily. 180 tablet 4     triamcinolone (KENALOG) 0.025 % external ointment Twice daily to rash on the abdomen until clear, then as needed. 60g=1 month. Please call patient to notify when prescription is ready. 60 g 1     gabapentin (NEURONTIN) 100 MG capsule Take 2 capsules (200 mg) by mouth every evening (Patient not taking: Reported on 2024) 60 capsule 5       ALLERGIES   No Known Allergies    PAST MEDICAL HISTORY:  Past Medical History:   Diagnosis Date     Arthritis     increasing symptoms     Cancer (H)     Melanoma excised from right facial cheek     Diverticulitis of colon 2011     Heart disease     Plac: taking low dose generic Lipitor       PAST SURGICAL HISTORY:  Past Surgical History:   Procedure Laterality Date     APPENDECTOMY       CATARACT IOL, RT/LT      bilateral     COLONOSCOPY      Last exam     GENITOURINARY SURGERY      repair of testicular hematoma     HERNIA REPAIR      bilateral inguinal     IR LUMBAR PUNCTURE  3/24/2023     IR LUMBAR PUNCTURE  2023       FAMILY HISTORY:  Family History   Problem Relation Age of Onset     C.A.D. Father      Cardiovascular Father          in OR due to carotid endarterectomy     Hypertension Brother      Diabetes Brother      Hyperlipidemia Brother      Lipids Brother      Other Cancer Brother      Thyroid Disease Brother         hypothyroidism     Blood Disease Brother         pulmonary embolism     Diabetes Maternal Grandmother      Diabetes Sister      Coronary Artery Disease Sister      Hypertension Sister      Thyroid Disease Sister        SOCIAL HISTORY:  Social History     Socioeconomic History     Marital status:      Spouse name: None     Number of children: None     Years of education: None     Highest education level: None   Tobacco Use     Smoking status: Former     Current packs/day: 0.00     Average packs/day: 0.3 packs/day for 40.0 years (10.0 ttl pk-yrs)     Types: Cigarettes     Start date:  1958     Quit date: 1998     Years since quittin.8     Passive exposure: Past     Smokeless tobacco: Never   Vaping Use     Vaping status: Never Used   Substance and Sexual Activity     Alcohol use: Yes     Comment: average 1 beer/wine 4x's a week     Drug use: No     Sexual activity: Not Currently     Partners: Female     Birth control/protection: Male Surgical   Other Topics Concern     Parent/sibling w/ CABG, MI or angioplasty before 65F 55M? No     Social Drivers of Health     Financial Resource Strain: Low Risk  (10/7/2024)    Financial Resource Strain      Within the past 12 months, have you or your family members you live with been unable to get utilities (heat, electricity) when it was really needed?: No   Food Insecurity: Low Risk  (10/7/2024)    Food Insecurity      Within the past 12 months, did you worry that your food would run out before you got money to buy more?: No      Within the past 12 months, did the food you bought just not last and you didn t have money to get more?: No   Transportation Needs: Low Risk  (10/7/2024)    Transportation Needs      Within the past 12 months, has lack of transportation kept you from medical appointments, getting your medicines, non-medical meetings or appointments, work, or from getting things that you need?: No   Physical Activity: Insufficiently Active (10/7/2024)    Exercise Vital Sign      Days of Exercise per Week: 3 days      Minutes of Exercise per Session: 30 min   Stress: No Stress Concern Present (10/7/2024)    [employer] - Occupational Stress Questionnaire      Feeling of Stress : Only a little   Social Connections: Unknown (10/7/2024)    Social Connection and Isolation Panel [NHANES]      Frequency of Social Gatherings with Friends and Family: Three times a week   Interpersonal Safety: Low Risk  (10/7/2024)    Interpersonal Safety      Do you feel physically and emotionally safe where you currently live?: Yes     "  Within the past 12 months, have you been hit, slapped, kicked or otherwise physically hurt by someone?: No      Within the past 12 months, have you been humiliated or emotionally abused in other ways by your partner or ex-partner?: No   Housing Stability: Low Risk  (10/7/2024)    Housing Stability      Do you have housing? : Yes      Are you worried about losing your housing?: No       Review of Systems:  Skin:        Eyes:       ENT:       Respiratory:       Cardiovascular:       Gastroenterology:      Genitourinary:       Musculoskeletal:       Neurologic:       Psychiatric:       Heme/Lymph/Imm:       Endocrine:         Physical Exam:  Vitals: /60 (BP Location: Right arm, Patient Position: Sitting, Cuff Size: Adult Regular)   Pulse 56   Ht 1.702 m (5' 7\")   Wt 74.4 kg (164 lb 1.6 oz)   SpO2 98%   BMI 25.70 kg/m      Constitutional:  cooperative, in no acute distress        Skin:  warm and dry to the touch          Head:  normocephalic        Eyes:  pupils equal and round        Lymph:      ENT:  no pallor or cyanosis        Neck:  no carotid bruit        Respiratory:  clear to auscultation         Cardiac: regular rhythm frequent premature beats distant heart sounds              pulses below the femoral arteries are diminished                                      GI:  abdomen soft        Extremities and Muscular Skeletal:  no deformities, clubbing, cyanosis, erythema observed, no edema              Neurological:  no gross motor deficits, affect appropriate        Psych:  Alert and Oriented x 3        Recent Lab Results:  LIPID RESULTS:  Lab Results   Component Value Date    CHOL 155 10/07/2024    CHOL 155 04/14/2021    HDL 69 10/07/2024    HDL 70 04/14/2021    LDL 75 10/07/2024    LDL 67 04/14/2021    TRIG 57 10/07/2024    TRIG 92 04/14/2021    CHOLHDLRATIO 1.7 03/13/2015       LIVER ENZYME RESULTS:  Lab Results   Component Value Date    AST 31 10/07/2024    AST 23 04/14/2021    ALT 24 10/07/2024    " "ALT 35 04/14/2021       CBC RESULTS:  Lab Results   Component Value Date    WBC 7.9 10/08/2024    WBC 8.1 02/07/2020    RBC 4.73 10/08/2024    RBC 4.72 02/07/2020    HGB 14.9 10/08/2024    HGB 15.0 02/07/2020    HCT 44.8 10/08/2024    HCT 44.5 02/07/2020    MCV 95 10/08/2024    MCV 94 02/07/2020    MCH 31.5 10/08/2024    MCH 31.8 02/07/2020    MCHC 33.3 10/08/2024    MCHC 33.7 02/07/2020    RDW 12.5 10/08/2024    RDW 12.9 02/07/2020     10/08/2024     02/07/2020       BMP RESULTS:  Lab Results   Component Value Date     10/08/2024     04/14/2021    POTASSIUM 4.2 10/08/2024    POTASSIUM 4.4 05/16/2022    POTASSIUM 4.0 04/14/2021    CHLORIDE 105 10/08/2024    CHLORIDE 109 05/16/2022    CHLORIDE 108 04/14/2021    CO2 22 10/08/2024    CO2 23 05/16/2022    CO2 24 04/14/2021    ANIONGAP 12 10/08/2024    ANIONGAP 8 05/16/2022    ANIONGAP 6 04/14/2021     (H) 10/08/2024    GLC 85 05/16/2022    GLC 89 04/14/2021    BUN 22.7 10/08/2024    BUN 17 05/16/2022    BUN 16 04/14/2021    CR 1.01 10/08/2024    CR 0.98 04/14/2021    GFRESTIMATED 72 10/08/2024    GFRESTIMATED 71 04/14/2021    GFRESTBLACK 83 04/14/2021    ANGELINA 9.6 10/08/2024    ANGELINA 9.4 04/14/2021        A1C RESULTS:  Lab Results   Component Value Date    A1C 5.8 03/11/2014       INR RESULTS:  No results found for: \"INR\"        CC  Referred Self, MD  No address on file                  Thank you for allowing me to participate in the care of your patient.      Sincerely,     Angelica Ponce, DO     [facility]  cc:   Referred Self, MD  No address on file      "

## 2024-12-18 ENCOUNTER — TRANSFERRED RECORDS (OUTPATIENT)
Dept: HEALTH INFORMATION MANAGEMENT | Facility: CLINIC | Age: 84
End: 2024-12-18
Payer: COMMERCIAL

## 2024-12-26 ENCOUNTER — HOSPITAL ENCOUNTER (OUTPATIENT)
Dept: CARDIOLOGY | Facility: CLINIC | Age: 84
End: 2024-12-26
Attending: INTERNAL MEDICINE
Payer: COMMERCIAL

## 2024-12-26 ENCOUNTER — TELEPHONE (OUTPATIENT)
Dept: CARDIOLOGY | Facility: CLINIC | Age: 84
End: 2024-12-26

## 2024-12-26 DIAGNOSIS — I47.10 PAROXYSMAL SUPRAVENTRICULAR TACHYCARDIA (H): ICD-10-CM

## 2024-12-26 LAB — LVEF ECHO: NORMAL

## 2024-12-26 PROCEDURE — 93306 TTE W/DOPPLER COMPLETE: CPT

## 2024-12-26 NOTE — TELEPHONE ENCOUNTER
Pt had visit with Dr. Ponce on 12/12/24 and per the note:  Summary  1.  Symptomatic SVT-this is complicated by occasional hypotension.  I discussed management options with him today including follow-up with electrophysiology and consideration of ablation procedure.  He would like to follow-up with EP to discuss this further.  In the meantime I have recommended that he stop taking metoprolol on a regular basis.  With the onset of tachycardia, first try a vagal maneuver which I have instructed him on several different types.  If the vagal maneuver fails I want him to grab an electrolyte water such as Gatorade and take a full tablet of metoprolol and go lay down.  I have instructed him to drink the full bottle of Gatorade to help avoid hypotension with taking metoprolol.  I have also asked him to increase the sodium in his diet to help with his blood pressure as well.  Lastly I would like him to undergo an echocardiogram to look for any structural abnormalities that may predispose him to atrial arrhythmias.     Pt had echo completed on 12/26/24 that showed:  Interpretation Summary  Left ventricular systolic function is normal.  The visual ejection fraction is 55-60%.  No regional wall motion abnormalities noted.  The study was technically adequate. There is no comparison study available.    Pt has follow up with EP on 2/3/25.

## 2024-12-31 ENCOUNTER — VIRTUAL VISIT (OUTPATIENT)
Dept: PEDIATRICS | Facility: CLINIC | Age: 84
End: 2024-12-31
Payer: COMMERCIAL

## 2024-12-31 ENCOUNTER — NURSE TRIAGE (OUTPATIENT)
Dept: PEDIATRICS | Facility: CLINIC | Age: 84
End: 2024-12-31

## 2024-12-31 DIAGNOSIS — U07.1 INFECTION DUE TO 2019 NOVEL CORONAVIRUS: Primary | ICD-10-CM

## 2024-12-31 PROCEDURE — 99213 OFFICE O/P EST LOW 20 MIN: CPT | Mod: 95 | Performed by: INTERNAL MEDICINE

## 2024-12-31 NOTE — TELEPHONE ENCOUNTER
RN COVID TREATMENT VISIT  12/31/24      The patient has been triaged and does not require a higher level of care.    /73  HR 63    Oz Long  84 year old  Current weight? 160#    Has the patient been seen by a primary care or specialty provider at a [facility] within the past three years? Yes.   Have you been in close proximity to/do you have a known exposure to a person with a confirmed case of influenza? No.     General treatment eligibility:  Date of positive COVID test (PCR or at home)?  12/31/24    Are you or have you been hospitalized for this COVID-19 infection? No.   Have you received monoclonal antibodies or antiviral treatment for COVID-19 since this positive test? No.   Do you have any of the following conditions that place you at risk of being very sick from COVID-19?   - Age 50 or older  - Overweight and Obesity BMI >= 25  - Smoking- current or former  Yes, patient has at least one high risk condition as noted above.     Current COVID symptoms:   - fatigue  - muscle or body aches  - headache  - new loss of taste or smell  - congestion or runny nose  Yes. Patient has at least one symptom as selected.     How many days since symptoms started? 5 days or less. Established patient, 12 years or older weighing at least 88.2 lbs, who has symptoms that started in the past 5 days, has not been hospitalized nor received treatment already, and is at risk for being very sick from COVID-19.     Treatment eligibility by RN:  Are you currently pregnant or nursing? No  Do you have a clinically significant hypersensitivity to nirmatrelvir or ritonavir, or toxic epidermal necrolysis (TEN) or Pham-Titi Syndrome? No  Do you have a history of hepatitis, any hepatic impairment on the Problem List (such as Child-Finch Class C, cirrhosis, fatty liver disease, alcoholic liver disease), or was the last liver lab (hepatic panel, ALT, AST, ALK Phos, bilirubin) elevated in the past 6 months?  YES  Do you have any history of severe renal impairment (eGFR < 30mL/min)? No    Is patient eligible to continue? No, patient does not meet all eligibility requirements for the RN COVID treatment (as denoted by yes response(s) above). Patient informed they will need a virtual provider visit to assess treatment options.  PCP had cancellation, scheduled patient for virtual visit today 12/31/24 at 4pm.  Talia Montelongo RN

## 2024-12-31 NOTE — TELEPHONE ENCOUNTER
Dr. Ponce reviewed results and has no further recommendations at this time.     Left detailed voicemail for pt regarding echo results that indicated normal findings.   Noticed that pt is scheduled on 2/3/25 with Dr. Machuca and he has openings next week, informed pt we could get his appt moved up sooner if he would like.   Left [phone] call back number for pt.

## 2024-12-31 NOTE — TELEPHONE ENCOUNTER
No fever  Lost sense of taste yesterday, has come back  Reason for Disposition   COVID-19 diagnosed by positive lab test (e.g., PCR, rapid self-test kit) and mild symptoms (e.g., cough, fever, others) and no complications or SOB    Additional Information   Negative: SEVERE difficulty breathing (e.g., struggling for each breath, speaks in single words)   Negative: Difficult to awaken or acting confused (e.g., disoriented, slurred speech)   Negative: Bluish (or gray) lips or face now   Negative: Shock suspected (e.g., cold/pale/clammy skin, too weak to stand, low BP, rapid pulse)   Negative: Sounds like a life-threatening emergency to the triager   Negative: Diagnosed or suspected COVID-19 and symptoms lasting 3 or more weeks   Negative: COVID-19 exposure and no symptoms   Negative: COVID-19 vaccine reaction suspected (e.g., fever, headache, muscle aches) occurring 1 to 3 days after getting vaccine   Negative: COVID-19 vaccine, questions about   Negative: Exposure to someone known to have influenza (flu test positive) and flu-like symptoms (e.g., cough, runny nose, sore throat, SOB; with or without fever)   Negative: Possible COVID-19 symptoms and triager concerned about severity of symptoms or other causes   Negative: COVID-19 and breastfeeding, questions about   Negative: SEVERE or constant chest pain or pressure  (Exception: Mild central chest pain, present only when coughing.)   Negative: MODERATE difficulty breathing (e.g., speaks in phrases, SOB even at rest, pulse 100-120)   Negative: Headache and stiff neck (can't touch chin to chest)   Negative: Oxygen level (e.g., pulse oximetry) 90% or lower   Negative: Chest pain or pressure  (Exception: MILD central chest pain, present only when coughing.)   Negative: Drinking very little and dehydration suspected (e.g., no urine > 12 hours, very dry mouth, very lightheaded)   Negative: Patient sounds very sick or weak to the triager   Negative: MILD difficulty breathing  "(e.g., minimal/no SOB at rest, SOB with walking, pulse <100)   Negative: Fever > 103 F (39.4 C)   Negative: Fever > 101 F (38.3 C) and over 60 years of age   Negative: Fever > 100 F (37.8 C) and bedridden (e.g., CVA, chronic illness, recovering from surgery)   Negative: HIGH RISK patient (e.g., weak immune system, age > 64 years, obesity with BMI of 30 or higher, pregnant, chronic lung disease) and COVID symptoms (e.g., cough, fever) (Exceptions: Already seen by doctor or NP/PA and no new or worsening symptoms.)   Negative: HIGH RISK patient and influenza is widespread in the community and ONE OR MORE respiratory symptoms: cough, sore throat, runny or stuffy nose   Negative: HIGH RISK patient and influenza exposure within the last 7 days and ONE OR MORE respiratory symptoms: cough, sore throat, runny or stuffy nose   Negative: Patient is NOT HIGH RISK but strongly requests antiviral medicine, AND COVID-19 symptoms present < 5 days   Negative: Oxygen level (e.g., pulse oximetry) 91 to 94%   Negative: COVID-19 infection suspected and mild symptoms (cough, fever, or others) with negative COVID-19 rapid test   Negative: Fever present > 3 days (72 hours)   Negative: Fever returns after gone for over 24 hours and symptoms worse or not improved   Negative: Continuous (nonstop) coughing interferes with work or school and no improvement using cough treatment per Care Advice   Negative: Cough present > 3 weeks   Negative: COVID-19 diagnosed by positive lab test (e.g., PCR, rapid self-test kit) and NO symptoms (e.g., cough, fever, others)    Answer Assessment - Initial Assessment Questions  1. SYMPTOMS: \"What is your main symptom or concern?\" (e.g., cough, fever, shortness of breath, muscle aches)      Congestion, never had a fever  2. ONSET: \"When did the symptoms start?\"       12/29/24  3. COUGH: \"Do you have a cough?\" If Yes, ask: \"How bad is the cough?\"        Occasional cough, dry cough  4. FEVER: \"Do you have a fever?\" " "If Yes, ask: \"What is your temperature, how was it measured, and when did it start?\"      No fever  5. BREATHING DIFFICULTY: \"Are you having any difficulty breathing?\" (e.g., normal; shortness of breath, wheezing, unable to speak)       No   6. BETTER-SAME-WORSE: \"Are you getting better, staying the same or getting worse compared to yesterday?\"  If getting worse, ask, \"In what way?\"      Better today than yesterday  7. OTHER SYMPTOMS: \"Do you have any other symptoms?\"  (e.g., chills, fatigue, headache, loss of smell or taste, muscle pain, sore throat)      Chills, fatigue, body aches, mild headache, no sore throat. Lost taste yesterday, has returned  8. COVID-19 DIAGNOSIS: \"How do you know that you have COVID?\" (e.g., positive lab test or self-test, diagnosed by doctor or NP/PA, symptoms after exposure).      Home test, 12/31  9. COVID-19 EXPOSURE: \"Was there any known exposure to COVID before the symptoms began?\"       Not that he's aware of  10. COVID-19 VACCINE: \"Have you had the COVID-19 vaccine?\" If Yes, ask: \"When did you last get it?\"        All vaccines  11. HIGH RISK DISEASE: \"Do you have any chronic medical problems?\" (e.g., asthma, heart or lung disease, weak immune system, obesity, etc.)        No asthma. Recent dx of tachycardia, doesn't seem to be an issue right now. Recent EKG - no issues.  12. PREGNANCY: \"Is there any chance you are pregnant?\" \"When was your last menstrual period?\"        N/a  13. O2 SATURATION MONITOR:  \"Do you use an oxygen saturation monitor (pulse oximeter) at home?\" If Yes, ask \"What is your reading (oxygen level) today?\" \"What is your usual oxygen saturation reading?\" (e.g., 95%)        N/a    Protocols used: COVID-19 - Diagnosed or [id number]    "

## 2024-12-31 NOTE — PROGRESS NOTES
Cleveland is a 84 year old who is being evaluated via a billable video visit.      Assessment & Plan       ICD-10-CM    1. Infection due to 2019 novel coronavirus  U07.1         COVID infection. Has not experienced a documented episode of COVID in the past.  Symptoms reviewed, currently are fairly mild.  We reviewed options.  For today, he will pass on Paxlovid.  We discussed if his symptoms worsen, he should contact me for a prescription. With sx onset would recommend starting 1/2/25 at the latest.   Otherwise follow symptom based cares.     Shayan Dixon MD      Subjective   Cleveland is a 84 year old, presenting for the following health issues:  Covid Concern      Video Start Time: 4:01 PM    HPI     Sx started 2 days ago  Test today - positive  Has not tested positive for COVID in the past    Sx now - nasal congestion  No fever    We reviewed management options.           Objective           Vitals:  No vitals were obtained today due to virtual visit.    Physical Exam   GEN: No distress          Video-Visit Details    Type of service:  Video Visit   Video End Time:4:11 PM  Originating Location (pt. Location): Home    Distant Location (provider location):  On-site  Platform used for Video Visit: [facility]  Signed Electronically by: Shayan Dixon MD

## 2025-01-02 ENCOUNTER — TELEPHONE (OUTPATIENT)
Dept: CARDIOLOGY | Facility: CLINIC | Age: 85
End: 2025-01-02
Payer: COMMERCIAL

## 2025-01-02 NOTE — TELEPHONE ENCOUNTER
1st attempt- Left voicemail for the patient to call back and schedule the following:    Appointment type:  New EP  Provider:  Dr Machuca  Return date:  early January 2025  Additional appointment(s) needed:  -  Additonal Notes:  reschedule from Feb to Jan - sooner date  Specialty phone number: [phone]

## 2025-02-03 ENCOUNTER — OFFICE VISIT (OUTPATIENT)
Dept: CARDIOLOGY | Facility: CLINIC | Age: 85
End: 2025-02-03
Attending: INTERNAL MEDICINE
Payer: COMMERCIAL

## 2025-02-03 VITALS
BODY MASS INDEX: 26.21 KG/M2 | DIASTOLIC BLOOD PRESSURE: 67 MMHG | HEIGHT: 67 IN | HEART RATE: 61 BPM | WEIGHT: 167 LBS | SYSTOLIC BLOOD PRESSURE: 125 MMHG

## 2025-02-03 DIAGNOSIS — Z00.00 ROUTINE GENERAL MEDICAL EXAMINATION AT A HEALTH CARE FACILITY: ICD-10-CM

## 2025-02-03 DIAGNOSIS — I47.10 PAROXYSMAL SUPRAVENTRICULAR TACHYCARDIA: ICD-10-CM

## 2025-02-03 PROCEDURE — G2211 COMPLEX E/M VISIT ADD ON: HCPCS | Performed by: INTERNAL MEDICINE

## 2025-02-03 PROCEDURE — 99203 OFFICE O/P NEW LOW 30 MIN: CPT | Performed by: INTERNAL MEDICINE

## 2025-02-03 RX ORDER — METOPROLOL TARTRATE 50 MG
50 TABLET ORAL 2 TIMES DAILY
Qty: 60 TABLET | Refills: 3 | Status: SHIPPED | OUTPATIENT
Start: 2025-02-03

## 2025-02-03 NOTE — PROGRESS NOTES
"  Electrophysiology Clinic Progress Note    Oz Long MRN# [id number]   YOB: 1940 Age: 84 year old     Primary cardiologist: Dr. Ponce         Assessment and Plan   Delightful 85 yo pt with symptomatic short RP SVT initiated by a long RP as documented by antony despite being on metoprolol in the background of nl lvef. Noted sbp in the 80's during svt episodes. Discussed option of an AAD vs. Ablation. 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 Discussed risks of the procedure including but not limited to vascular injury and AVN injury requiring a pacemaker. Hold bb night before and am of procedure.       The longitudinal plan of care of the diagnosis(es)/condition (s) as documented were addressed during this visit. Due to the added complexity in care, I will continue to support the patient in the subsequent management and with ongoing continuity of care.       Review of Systems     12-pt ROS is negative except for as noted in the HPI.          Physical Exam     Vitals: /67   Pulse 61   Ht 1.702 m (5' 7\")   Wt 75.8 kg (167 lb)   BMI 26.16 kg/m    Wt Readings from Last 10 Encounters:   02/03/25 75.8 kg (167 lb)   12/12/24 74.4 kg (164 lb 1.6 oz)   10/14/24 75.5 kg (166 lb 8 oz)   10/07/24 74.3 kg (163 lb 14.4 oz)   04/08/24 74.6 kg (164 lb 8 oz)   03/03/24 75.1 kg (165 lb 8 oz)   09/05/23 78 kg (172 lb)   05/16/22 80.1 kg (176 lb 8 oz)   04/09/21 83.9 kg (185 lb)   06/02/20 82.1 kg (180 lb 14.4 oz)       Constitutional:  Patient is pleasant, alert, cooperative, and in NAD.  HEENT:  NCAT. PERRLA. EOM's intact.   Neck:  CVP appears normal. No carotid bruits.   Pulmonary: Normal respiratory effort. CTAB.   Cardiac: RRR, normal S1/S2, no S3/S4, no murmur or rub.   Abdomen:  Non-tender abdomen, no hepatosplenomegaly appreciated.   Vascular: Pulses in the upper and lower extremities are 2+ and equal bilaterally.  Extremities: No edema, erythema, cyanosis or tenderness " "appreciated.  Skin:  No rashes or lesions appreciated.   Neurological:  No gross motor or sensory deficits.   Psych: Appropriate affect.          Data   Labs reviewed:  Recent Labs   Lab Test 10/08/24  1211 10/07/24  0727 03/03/24  0938 09/05/23  0859 05/16/22  1408 04/14/21  0720 02/07/20  1410   LDL  --  75  --  79 130*   < >  --    HDL  --  69  --  67 66   < >  --    NHDL  --  86  --  90 140*   < >  --    CHOL  --  155  --  157 206*   < >  --    TRIG  --  57  --  55 52   < >  --    TSH 4.80*  --  3.30  --   --   --  3.60    < > = values in this interval not displayed.       Lab Results   Component Value Date    WBC 7.9 10/08/2024    WBC 8.1 02/07/2020    RBC 4.73 10/08/2024    RBC 4.72 02/07/2020    HGB 14.9 10/08/2024    HGB 15.0 02/07/2020    HCT 44.8 10/08/2024    HCT 44.5 02/07/2020    MCV 95 10/08/2024    MCV 94 02/07/2020    MCH 31.5 10/08/2024    MCH 31.8 02/07/2020    MCHC 33.3 10/08/2024    MCHC 33.7 02/07/2020    RDW 12.5 10/08/2024    RDW 12.9 02/07/2020     10/08/2024     02/07/2020       Lab Results   Component Value Date     10/08/2024     04/14/2021    POTASSIUM 4.2 10/08/2024    POTASSIUM 4.4 05/16/2022    POTASSIUM 4.0 04/14/2021    CHLORIDE 105 10/08/2024    CHLORIDE 109 05/16/2022    CHLORIDE 108 04/14/2021    CO2 22 10/08/2024    CO2 23 05/16/2022    CO2 24 04/14/2021    ANIONGAP 12 10/08/2024    ANIONGAP 8 05/16/2022    ANIONGAP 6 04/14/2021     (H) 10/08/2024    GLC 85 05/16/2022    GLC 89 04/14/2021    BUN 22.7 10/08/2024    BUN 17 05/16/2022    BUN 16 04/14/2021    CR 1.01 10/08/2024    CR 0.98 04/14/2021    GFRESTIMATED 72 10/08/2024    GFRESTIMATED 71 04/14/2021    GFRESTBLACK 83 04/14/2021    ANGELINA 9.6 10/08/2024    ANGELINA 9.4 04/14/2021      Lab Results   Component Value Date    AST 31 10/07/2024    AST 23 04/14/2021    ALT 24 10/07/2024    ALT 35 04/14/2021       Lab Results   Component Value Date    A1C 5.8 03/11/2014       No results found for: \"INR\"      "    Problem List     Patient Active Problem List   Diagnosis    Solitary pulmonary nodule    Coronary atherosclerosis of native coronary artery    Hyperlipidemia LDL goal <100    Impaired fasting glucose    Left inguinal hernia    Hydrocele, left    Inflamed seborrheic keratosis    AK (actinic keratosis)    History of melanoma    Paroxysmal supraventricular tachycardia            Medications     Current Outpatient Medications   Medication Sig Dispense Refill    atorvastatin (LIPITOR) 20 MG tablet Take 1 tablet (20 mg) by mouth daily. 90 tablet 4    metoprolol tartrate (LOPRESSOR) 25 MG tablet Take 1 tablet (25 mg) by mouth 2 times daily. (Patient taking differently: Take 25 mg by mouth 2 times daily. 12.5 mg four times per day) 180 tablet 4    triamcinolone (KENALOG) 0.025 % external ointment Twice daily to rash on the abdomen until clear, then as needed. 60g=1 month. Please call patient to notify when prescription is ready. 60 g 1    gabapentin (NEURONTIN) 100 MG capsule Take 2 capsules (200 mg) by mouth every evening (Patient not taking: Reported on 10/14/2024) 60 capsule 5            Past Medical History     Past Medical History:   Diagnosis Date    Arthritis     increasing symptoms    Cancer (H)     Melanoma excised from right facial cheek    Diverticulitis of colon 2011    Heart disease 2007    Plac: taking low dose generic Lipitor     Past Surgical History:   Procedure Laterality Date    APPENDECTOMY      CATARACT IOL, RT/LT      bilateral    COLONOSCOPY      Last exam    GENITOURINARY SURGERY      repair of testicular hematoma    HERNIA REPAIR      bilateral inguinal    IR LUMBAR PUNCTURE  3/24/2023    IR LUMBAR PUNCTURE  2023     Family History   Problem Relation Age of Onset    C.A.D. Father     Cardiovascular Father          in OR due to carotid endarterectomy    Hypertension Brother     Diabetes Brother     Hyperlipidemia Brother     Lipids Brother     Other Cancer Brother      Thyroid Disease Brother         hypothyroidism    Blood Disease Brother         pulmonary embolism    Diabetes Maternal Grandmother     Diabetes Sister     Coronary Artery Disease Sister     Hypertension Sister     Thyroid Disease Sister      Social History     Socioeconomic History    Marital status:      Spouse name: Not on file    Number of children: Not on file    Years of education: Not on file    Highest education level: Not on file   Occupational History    Not on file   Tobacco Use    Smoking status: Former     Current packs/day: 0.00     Average packs/day: 0.3 packs/day for 40.0 years (10.0 ttl pk-yrs)     Types: Cigarettes     Start date: 1958     Quit date: 1998     Years since quittin.0     Passive exposure: Past    Smokeless tobacco: Never   Vaping Use    Vaping status: Never Used   Substance and Sexual Activity    Alcohol use: Yes     Comment: 1-2 drinks per week    Drug use: No    Sexual activity: Not Currently     Partners: Female     Birth control/protection: Male Surgical   Other Topics Concern    Parent/sibling w/ CABG, MI or angioplasty before 65F 55M? No   Social History Narrative    Not on file     Social Drivers of Health     Financial Resource Strain: Low Risk  (10/7/2024)    Financial Resource Strain     Within the past 12 months, have you or your family members you live with been unable to get utilities (heat, electricity) when it was really needed?: No   Food Insecurity: Low Risk  (10/7/2024)    Food Insecurity     Within the past 12 months, did you worry that your food would run out before you got money to buy more?: No     Within the past 12 months, did the food you bought just not last and you didn t have money to get more?: No   Transportation Needs: Low Risk  (10/7/2024)    Transportation Needs     Within the past 12 months, has lack of transportation kept you from medical appointments, getting your medicines, non-medical meetings or appointments, work, or from  getting things that you need?: No   Physical Activity: Insufficiently Active (10/7/2024)    Exercise Vital Sign     Days of Exercise per Week: 3 days     Minutes of Exercise per Session: 30 min   Stress: No Stress Concern Present (10/7/2024)    [employer] - Occupational Stress Questionnaire     Feeling of Stress : Only a little   Social Connections: Unknown (10/7/2024)    Social Connection and Isolation Panel [NHANES]     Frequency of Communication with Friends and Family: Not on file     Frequency of Social Gatherings with Friends and Family: Three times a week     Attends Mandaen Services: Not on file     Active Member of Clubs or Organizations: Not on file     Attends Club or Organization Meetings: Not on file     Marital Status: Not on file   Interpersonal Safety: Low Risk  (10/7/2024)    Interpersonal Safety     Do you feel physically and emotionally safe where you currently live?: Yes     Within the past 12 months, have you been hit, slapped, kicked or otherwise physically hurt by someone?: No     Within the past 12 months, have you been humiliated or emotionally abused in other ways by your partner or ex-partner?: No   Housing Stability: Low Risk  (10/7/2024)    Housing Stability     Do you have housing? : Yes     Are you worried about losing your housing?: No            Allergies   Patient has no known allergies.    Today's clinic visit entailed:  The following tests were independently interpreted by me as noted in my documentation: ecg. zio  30 minutes spent by me on the date of the encounter doing chart review, history and exam, documentation and further activities per the note  Provider  Link to [facility] Help Grid     The level of medical decision making during this visit was of moderate complexity.

## 2025-02-03 NOTE — LETTER
"2/3/2025    Shayan Dixon MD  [address] [place] [address]    RE: Oz Long       Dear Colleague,     I had the pleasure of seeing Oz Long in the [facility].    Electrophysiology Clinic Progress Note    Oz Long MRN# [id number]   YOB: 1940 Age: 84 year old     Primary cardiologist: Dr. Ponce         Assessment and Plan   Delightful 83 yo pt with symptomatic short RP SVT initiated by a long RP as documented by annaliseo despite being on metoprolol in the background of nl lvef. Noted sbp in the 80's during svt episodes. Discussed option of an AAD vs. Ablation. 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 Discussed risks of the procedure including but not limited to vascular injury and AVN injury requiring a pacemaker. Hold bb night before and am of procedure.       The longitudinal plan of care of the diagnosis(es)/condition (s) as documented were addressed during this visit. Due to the added complexity in care, I will continue to support the patient in the subsequent management and with ongoing continuity of care.       Review of Systems     12-pt ROS is negative except for as noted in the HPI.          Physical Exam     Vitals: /67   Pulse 61   Ht 1.702 m (5' 7\")   Wt 75.8 kg (167 lb)   BMI 26.16 kg/m    Wt Readings from Last 10 Encounters:   02/03/25 75.8 kg (167 lb)   12/12/24 74.4 kg (164 lb 1.6 oz)   10/14/24 75.5 kg (166 lb 8 oz)   10/07/24 74.3 kg (163 lb 14.4 oz)   04/08/24 74.6 kg (164 lb 8 oz)   03/03/24 75.1 kg (165 lb 8 oz)   09/05/23 78 kg (172 lb)   05/16/22 80.1 kg (176 lb 8 oz)   04/09/21 83.9 kg (185 lb)   06/02/20 82.1 kg (180 lb 14.4 oz)       Constitutional:  Patient is pleasant, alert, cooperative, and in NAD.  HEENT:  NCAT. PERRLA. EOM's intact.   Neck:  CVP appears normal. No carotid bruits.   Pulmonary: Normal respiratory effort. CTAB.   Cardiac: RRR, normal S1/S2, no S3/S4, no murmur or rub. "   Abdomen:  Non-tender abdomen, no hepatosplenomegaly appreciated.   Vascular: Pulses in the upper and lower extremities are 2+ and equal bilaterally.  Extremities: No edema, erythema, cyanosis or tenderness appreciated.  Skin:  No rashes or lesions appreciated.   Neurological:  No gross motor or sensory deficits.   Psych: Appropriate affect.          Data   Labs reviewed:  Recent Labs   Lab Test 10/08/24  1211 10/07/24  0727 03/03/24  0938 09/05/23  0859 05/16/22  1408 04/14/21  0720 02/07/20  1410   LDL  --  75  --  79 130*   < >  --    HDL  --  69  --  67 66   < >  --    NHDL  --  86  --  90 140*   < >  --    CHOL  --  155  --  157 206*   < >  --    TRIG  --  57  --  55 52   < >  --    TSH 4.80*  --  3.30  --   --   --  3.60    < > = values in this interval not displayed.       Lab Results   Component Value Date    WBC 7.9 10/08/2024    WBC 8.1 02/07/2020    RBC 4.73 10/08/2024    RBC 4.72 02/07/2020    HGB 14.9 10/08/2024    HGB 15.0 02/07/2020    HCT 44.8 10/08/2024    HCT 44.5 02/07/2020    MCV 95 10/08/2024    MCV 94 02/07/2020    MCH 31.5 10/08/2024    MCH 31.8 02/07/2020    MCHC 33.3 10/08/2024    MCHC 33.7 02/07/2020    RDW 12.5 10/08/2024    RDW 12.9 02/07/2020     10/08/2024     02/07/2020       Lab Results   Component Value Date     10/08/2024     04/14/2021    POTASSIUM 4.2 10/08/2024    POTASSIUM 4.4 05/16/2022    POTASSIUM 4.0 04/14/2021    CHLORIDE 105 10/08/2024    CHLORIDE 109 05/16/2022    CHLORIDE 108 04/14/2021    CO2 22 10/08/2024    CO2 23 05/16/2022    CO2 24 04/14/2021    ANIONGAP 12 10/08/2024    ANIONGAP 8 05/16/2022    ANIONGAP 6 04/14/2021     (H) 10/08/2024    GLC 85 05/16/2022    GLC 89 04/14/2021    BUN 22.7 10/08/2024    BUN 17 05/16/2022    BUN 16 04/14/2021    CR 1.01 10/08/2024    CR 0.98 04/14/2021    GFRESTIMATED 72 10/08/2024    GFRESTIMATED 71 04/14/2021    GFRESTBLACK 83 04/14/2021    ANGELINA 9.6 10/08/2024    ANGELINA 9.4 04/14/2021      Lab Results  "  Component Value Date    AST 31 10/07/2024    AST 23 04/14/2021    ALT 24 10/07/2024    ALT 35 04/14/2021       Lab Results   Component Value Date    A1C 5.8 03/11/2014       No results found for: \"INR\"         Problem List     Patient Active Problem List   Diagnosis     Solitary pulmonary nodule     Coronary atherosclerosis of native coronary artery     Hyperlipidemia LDL goal <100     Impaired fasting glucose     Left inguinal hernia     Hydrocele, left     Inflamed seborrheic keratosis     AK (actinic keratosis)     History of melanoma     Paroxysmal supraventricular tachycardia            Medications     Current Outpatient Medications   Medication Sig Dispense Refill     atorvastatin (LIPITOR) 20 MG tablet Take 1 tablet (20 mg) by mouth daily. 90 tablet 4     metoprolol tartrate (LOPRESSOR) 25 MG tablet Take 1 tablet (25 mg) by mouth 2 times daily. (Patient taking differently: Take 25 mg by mouth 2 times daily. 12.5 mg four times per day) 180 tablet 4     triamcinolone (KENALOG) 0.025 % external ointment Twice daily to rash on the abdomen until clear, then as needed. 60g=1 month. Please call patient to notify when prescription is ready. 60 g 1     gabapentin (NEURONTIN) 100 MG capsule Take 2 capsules (200 mg) by mouth every evening (Patient not taking: Reported on 10/14/2024) 60 capsule 5            Past Medical History     Past Medical History:   Diagnosis Date     Arthritis 1990    increasing symptoms     Cancer (H) 2007    Melanoma excised from right facial cheek     Diverticulitis of colon 01/01/2011     Heart disease 2007    Plac: taking low dose generic Lipitor     Past Surgical History:   Procedure Laterality Date     APPENDECTOMY       CATARACT IOL, RT/LT      bilateral     COLONOSCOPY  2014    Last exam     GENITOURINARY SURGERY      repair of testicular hematoma     HERNIA REPAIR      bilateral inguinal     IR LUMBAR PUNCTURE  3/24/2023     IR LUMBAR PUNCTURE  4/21/2023     Family History   Problem " Relation Age of Onset     C.A.D. Father      Cardiovascular Father          in OR due to carotid endarterectomy     Hypertension Brother      Diabetes Brother      Hyperlipidemia Brother      Lipids Brother      Other Cancer Brother      Thyroid Disease Brother         hypothyroidism     Blood Disease Brother         pulmonary embolism     Diabetes Maternal Grandmother      Diabetes Sister      Coronary Artery Disease Sister      Hypertension Sister      Thyroid Disease Sister      Social History     Socioeconomic History     Marital status:      Spouse name: Not on file     Number of children: Not on file     Years of education: Not on file     Highest education level: Not on file   Occupational History     Not on file   Tobacco Use     Smoking status: Former     Current packs/day: 0.00     Average packs/day: 0.3 packs/day for 40.0 years (10.0 ttl pk-yrs)     Types: Cigarettes     Start date: 1958     Quit date: 1998     Years since quittin.0     Passive exposure: Past     Smokeless tobacco: Never   Vaping Use     Vaping status: Never Used   Substance and Sexual Activity     Alcohol use: Yes     Comment: 1-2 drinks per week     Drug use: No     Sexual activity: Not Currently     Partners: Female     Birth control/protection: Male Surgical   Other Topics Concern     Parent/sibling w/ CABG, MI or angioplasty before 65F 55M? No   Social History Narrative     Not on file     Social Drivers of Health     Financial Resource Strain: Low Risk  (10/7/2024)    Financial Resource Strain      Within the past 12 months, have you or your family members you live with been unable to get utilities (heat, electricity) when it was really needed?: No   Food Insecurity: Low Risk  (10/7/2024)    Food Insecurity      Within the past 12 months, did you worry that your food would run out before you got money to buy more?: No      Within the past 12 months, did the food you bought just not last and you didn t have  money to get more?: No   Transportation Needs: Low Risk  (10/7/2024)    Transportation Needs      Within the past 12 months, has lack of transportation kept you from medical appointments, getting your medicines, non-medical meetings or appointments, work, or from getting things that you need?: No   Physical Activity: Insufficiently Active (10/7/2024)    Exercise Vital Sign      Days of Exercise per Week: 3 days      Minutes of Exercise per Session: 30 min   Stress: No Stress Concern Present (10/7/2024)    [employer] [place] [employer] - Occupational Stress Questionnaire      Feeling of Stress : Only a little   Social Connections: Unknown (10/7/2024)    Social Connection and Isolation Panel [NHANES]      Frequency of Communication with Friends and Family: Not on file      Frequency of Social Gatherings with Friends and Family: Three times a week      Attends Christian Services: Not on file      Active Member of Clubs or Organizations: Not on file      Attends Club or Organization Meetings: Not on file      Marital Status: Not on file   Interpersonal Safety: Low Risk  (10/7/2024)    Interpersonal Safety      Do you feel physically and emotionally safe where you currently live?: Yes      Within the past 12 months, have you been hit, slapped, kicked or otherwise physically hurt by someone?: No      Within the past 12 months, have you been humiliated or emotionally abused in other ways by your partner or ex-partner?: No   Housing Stability: Low Risk  (10/7/2024)    Housing Stability      Do you have housing? : Yes      Are you worried about losing your housing?: No            Allergies   Patient has no known allergies.    Today's clinic visit entailed:  The following tests were independently interpreted by me as noted in my documentation: ecg. zio  30 minutes spent by me on the date of the encounter doing chart review, history and exam, documentation and further activities per the note  Provider  Link to MDM  Help Grid     The level of medical decision making during this visit was of moderate complexity.       Thank you for allowing me to participate in the care of your patient.      Sincerely,     Jim Sheth MD     [facility]  cc:   Shayan Dixon MD  [address] [place] [address]

## 2025-02-18 ENCOUNTER — TELEPHONE (OUTPATIENT)
Dept: CARDIOLOGY | Facility: CLINIC | Age: 85
End: 2025-02-18
Payer: COMMERCIAL

## 2025-02-18 NOTE — TELEPHONE ENCOUNTER
[facility] Call Center    Phone Message    May a detailed message be left on voicemail: yes     Reason for Call: Symptoms or Concerns     If patient has red-flag symptoms, warm transfer to triage line    Current symptom or concern: HR of 129 for 14 hours straight    Symptoms have been present for: 14 hour(s)    Has patient previously been seen for this? Yes    By : Dr Machuca    Date: 2/3/25    Are there any new or worsening symptoms? Yes:     Action Taken: Other: cardio    Travel Screening: Not Applicable    Thank you!  [facility]       Date of Service:

## 2025-02-18 NOTE — TELEPHONE ENCOUNTER
Called pt back to get more information on current episode. Pt states he has ablation scheduled for next Wednesday, 2/26 but is going on close to 16 hrs of high HR in 120-130s now. Previously when this has happened it has only lasted 6-8 hours. States he has taken regular Metoprolol doses + extra PRN half with no change. Has tried recommendations given from  in 12/24 to do vagal maneuver and inc fluids and has had no success. Pt not complaining of any related symptoms such as dizziness, cp or sob. Most recent VS from this AM are BP 99/66 and . Will route to  to advise on further recs.    BRET Valadez

## 2025-02-18 NOTE — TELEPHONE ENCOUNTER
Per Dr. Machuca:   Ask him to lie down on his back and raise both legs up pointing toward the ceiling. Use a straw if has one and blow really hard into it. If no straw pretend he has one and blow. This is the best vasovagal maneuver. Do that for a few times. If does not work take another 1/2 tab of metoprolol. If no symptoms but rate still at 120 ok to monitor and give us a call tomorrow.      Called to notify pt of these recommendations and pt stated about a minute after ending our previous call he converted back to a normal rate. Still notified pt of recommendations if another episode were to occur but otherwise pt will continue to monitor for now.     BRET Valadez

## 2025-02-24 DIAGNOSIS — I47.10 PAROXYSMAL SUPRAVENTRICULAR TACHYCARDIA: Primary | ICD-10-CM

## 2025-02-24 RX ORDER — LIDOCAINE 40 MG/G
CREAM TOPICAL
Status: CANCELLED | OUTPATIENT
Start: 2025-02-24

## 2025-02-24 RX ORDER — SODIUM CHLORIDE, SODIUM LACTATE, POTASSIUM CHLORIDE, CALCIUM CHLORIDE 600; 310; 30; 20 MG/100ML; MG/100ML; MG/100ML; MG/100ML
INJECTION, SOLUTION INTRAVENOUS CONTINUOUS
Status: CANCELLED | OUTPATIENT
Start: 2025-02-24

## 2025-02-25 ENCOUNTER — TELEPHONE (OUTPATIENT)
Dept: CARDIOLOGY | Facility: CLINIC | Age: 85
End: 2025-02-25
Payer: COMMERCIAL

## 2025-02-25 NOTE — TELEPHONE ENCOUNTER
Called pt regarding SVT ablation on 2/26    Notified of arrival time 11AM and location [facility]  No solids 8 hours prior to arrival time (3AM)  Clear liquids up until 2 hours prior to arrival (9AM)- Discussed clear liquids allowed (7 up, ginger ale, chicken broth, apple juice, water, coffee with no cream or sugar)   Pt may have sips of water for am meds  Discussed meds to be held - Metoprolol tonight and in AM    Discussed that patient will need a  for ride home and someone to stay with pt x 24 hours once pt arrives home   Pt will check temperature am of procedure and call [facility] at [phone] in the am if temp > 100.0  Pt voiced understanding and stated they have no further questions at this time.    BRET Valadez

## 2025-02-26 ENCOUNTER — HOSPITAL ENCOUNTER (OUTPATIENT)
Facility: CLINIC | Age: 85
Discharge: HOME | End: 2025-02-26
Attending: INTERNAL MEDICINE | Admitting: INTERNAL MEDICINE
Payer: COMMERCIAL

## 2025-02-26 VITALS
DIASTOLIC BLOOD PRESSURE: 66 MMHG | HEART RATE: 59 BPM | WEIGHT: 163 LBS | TEMPERATURE: 97.5 F | OXYGEN SATURATION: 96 % | SYSTOLIC BLOOD PRESSURE: 110 MMHG | BODY MASS INDEX: 23.34 KG/M2 | RESPIRATION RATE: 16 BRPM | HEIGHT: 70 IN

## 2025-02-26 DIAGNOSIS — I47.10 PAROXYSMAL SUPRAVENTRICULAR TACHYCARDIA: ICD-10-CM

## 2025-02-26 LAB
ANION GAP SERPL CALCULATED.3IONS-SCNC: 10 MMOL/L (ref 7–15)
ATRIAL RATE - MUSE: 131 BPM
BUN SERPL-MCNC: 18.5 MG/DL (ref 8–23)
CALCIUM SERPL-MCNC: 9.4 MG/DL (ref 8.8–10.4)
CHLORIDE SERPL-SCNC: 104 MMOL/L (ref 98–107)
CREAT SERPL-MCNC: 1.06 MG/DL (ref 0.67–1.17)
DIASTOLIC BLOOD PRESSURE - MUSE: NORMAL MMHG
EGFRCR SERPLBLD CKD-EPI 2021: 68 ML/MIN/1.73M2
ERYTHROCYTE [DISTWIDTH] IN BLOOD BY AUTOMATED COUNT: 13.2 % (ref 10–15)
GLUCOSE SERPL-MCNC: 108 MG/DL (ref 70–99)
HCO3 SERPL-SCNC: 24 MMOL/L (ref 22–29)
HCT VFR BLD AUTO: 42.7 % (ref 40–53)
HGB BLD-MCNC: 14.4 G/DL (ref 13.3–17.7)
INTERPRETATION ECG - MUSE: NORMAL
MCH RBC QN AUTO: 31.9 PG (ref 26.5–33)
MCHC RBC AUTO-ENTMCNC: 33.7 G/DL (ref 31.5–36.5)
MCV RBC AUTO: 95 FL (ref 78–100)
P AXIS - MUSE: NORMAL DEGREES
PLATELET # BLD AUTO: 189 10E3/UL (ref 150–450)
POTASSIUM SERPL-SCNC: 4.8 MMOL/L (ref 3.4–5.3)
PR INTERVAL - MUSE: NORMAL MS
QRS DURATION - MUSE: 140 MS
QT - MUSE: 352 MS
QTC - MUSE: 509 MS
R AXIS - MUSE: -19 DEGREES
RBC # BLD AUTO: 4.51 10E6/UL (ref 4.4–5.9)
SODIUM SERPL-SCNC: 138 MMOL/L (ref 135–145)
SYSTOLIC BLOOD PRESSURE - MUSE: NORMAL MMHG
T AXIS - MUSE: 55 DEGREES
VENTRICULAR RATE- MUSE: 126 BPM
WBC # BLD AUTO: 8.5 10E3/UL (ref 4–11)

## 2025-02-26 PROCEDURE — 36415 COLL VENOUS BLD VENIPUNCTURE: CPT | Performed by: INTERNAL MEDICINE

## 2025-02-26 PROCEDURE — 99152 MOD SED SAME PHYS/QHP 5/>YRS: CPT | Performed by: INTERNAL MEDICINE

## 2025-02-26 PROCEDURE — 80048 BASIC METABOLIC PNL TOTAL CA: CPT | Performed by: INTERNAL MEDICINE

## 2025-02-26 PROCEDURE — 258N000003 HC RX IP 258 OP 636: Performed by: INTERNAL MEDICINE

## 2025-02-26 PROCEDURE — C1887 CATHETER, GUIDING: HCPCS | Performed by: INTERNAL MEDICINE

## 2025-02-26 PROCEDURE — 250N000011 HC RX IP 250 OP 636: Performed by: INTERNAL MEDICINE

## 2025-02-26 PROCEDURE — 99153 MOD SED SAME PHYS/QHP EA: CPT | Performed by: INTERNAL MEDICINE

## 2025-02-26 PROCEDURE — 85014 HEMATOCRIT: CPT | Performed by: INTERNAL MEDICINE

## 2025-02-26 PROCEDURE — 93653 COMPRE EP EVAL TX SVT: CPT | Performed by: INTERNAL MEDICINE

## 2025-02-26 PROCEDURE — 999N000054 HC STATISTIC EKG NON-CHARGEABLE

## 2025-02-26 PROCEDURE — 93005 ELECTROCARDIOGRAM TRACING: CPT

## 2025-02-26 PROCEDURE — 999N000071 HC STATISTIC HEART CATH LAB OR EP LAB

## 2025-02-26 PROCEDURE — 82310 ASSAY OF CALCIUM: CPT | Performed by: INTERNAL MEDICINE

## 2025-02-26 PROCEDURE — 272N000001 HC OR GENERAL SUPPLY STERILE: Performed by: INTERNAL MEDICINE

## 2025-02-26 PROCEDURE — C1732 CATH, EP, DIAG/ABL, 3D/VECT: HCPCS | Performed by: INTERNAL MEDICINE

## 2025-02-26 PROCEDURE — 250N000009 HC RX 250: Performed by: INTERNAL MEDICINE

## 2025-02-26 PROCEDURE — 999N000184 HC STATISTIC TELEMETRY

## 2025-02-26 PROCEDURE — C1760 CLOSURE DEV, VASC: HCPCS | Performed by: INTERNAL MEDICINE

## 2025-02-26 RX ORDER — SODIUM CHLORIDE, SODIUM LACTATE, POTASSIUM CHLORIDE, CALCIUM CHLORIDE 600; 310; 30; 20 MG/100ML; MG/100ML; MG/100ML; MG/100ML
INJECTION, SOLUTION INTRAVENOUS CONTINUOUS
Status: DISCONTINUED | OUTPATIENT
Start: 2025-02-26 | End: 2025-02-26 | Stop reason: HOSPADM

## 2025-02-26 RX ORDER — NALOXONE HYDROCHLORIDE 0.4 MG/ML
0.4 INJECTION, SOLUTION INTRAMUSCULAR; INTRAVENOUS; SUBCUTANEOUS
Status: DISCONTINUED | OUTPATIENT
Start: 2025-02-26 | End: 2025-02-26 | Stop reason: HOSPADM

## 2025-02-26 RX ORDER — NALOXONE HYDROCHLORIDE 0.4 MG/ML
0.2 INJECTION, SOLUTION INTRAMUSCULAR; INTRAVENOUS; SUBCUTANEOUS
Status: DISCONTINUED | OUTPATIENT
Start: 2025-02-26 | End: 2025-02-26 | Stop reason: HOSPADM

## 2025-02-26 RX ORDER — FENTANYL CITRATE 50 UG/ML
INJECTION, SOLUTION INTRAMUSCULAR; INTRAVENOUS
Status: DISCONTINUED | OUTPATIENT
Start: 2025-02-26 | End: 2025-02-26 | Stop reason: HOSPADM

## 2025-02-26 RX ORDER — OXYCODONE AND ACETAMINOPHEN 5; 325 MG/1; MG/1
1 TABLET ORAL EVERY 4 HOURS PRN
Status: DISCONTINUED | OUTPATIENT
Start: 2025-02-26 | End: 2025-02-26 | Stop reason: HOSPADM

## 2025-02-26 RX ORDER — LIDOCAINE 40 MG/G
CREAM TOPICAL
Status: DISCONTINUED | OUTPATIENT
Start: 2025-02-26 | End: 2025-02-26 | Stop reason: HOSPADM

## 2025-02-26 RX ADMIN — SODIUM CHLORIDE, SODIUM LACTATE, POTASSIUM CHLORIDE, AND CALCIUM CHLORIDE: .6; .31; .03; .02 INJECTION, SOLUTION INTRAVENOUS at 11:34

## 2025-02-26 ASSESSMENT — ACTIVITIES OF DAILY LIVING (ADL)
ADLS_ACUITY_SCORE: 46

## 2025-02-26 NOTE — PROGRESS NOTES
[facility] Admission Nursing Note    Patient Information  Name: Oz Long  Age: 84 year old  Reason for admission: svt ablation  [facility] arrival time: 1100       Patient Admission/Assessment   Pre-procedure assessment complete: Yes  If abnormal assessment/labs, provider notified: N/A  NPO: Yes  Medications held per instructions/orders: Yes  Consent: obtained  If applicable, pregnancy test status:   Patient oriented to room: Yes  Education/questions answered: Yes      Discharge Planning  Discharge name/phone number:  Mahamed [phone]  Overnight post sedation caregiver: yes  Discharge location: [place]    Keyona Gary RN        
[facility] Discharge Nursing Note    Patient Information  Name: Oz Long  Age: 84 year old    Discharge Education:  Discharge instructions reviewed: Yes  Additional education/resources provided:   Patient/patient representative verbalizes understanding: Yes  Patient discharging on new medications: No  Medication education completed: N/A    Discharge Plans:   Discharge location: home  Discharge ride contacted: Yes  Approximate discharge time: 1600    Discharge Criteria:  Discharge criteria met and vital signs stable: Yes    Patient Belongs:  Patient belongings returned to patient: Yes    Keyona Gary RN      
[facility] Post Procedure Note    Patient Information  Name: Oz Long  Age: 84 year old    Post Procedure  Time patient returned to [facility]: 1340  Concerns/abnormal assessment: none  If abnormal assessment, provider notified: N/A  Plan/Other: Right groin CDI, soft and flat.  Vascade used on both puncture sites.  Reports no pain.    Sisi Soto RN    
Chart reviewed for upcoming appointment. Orders are in place.     
Uneventful ablation of typical AVNRT  Accesses Vascaded  Stop metoprolol  Bedrest for 2 hours.  
Adequate: hears normal conversation without difficulty

## 2025-02-26 NOTE — DISCHARGE INSTRUCTIONS
SVT Ablation Discharge Instructions      After you go home:    Have an adult stay with you until tomorrow.  You may resume your normal diet.       For 24 hours - due to the sedation you received:  Relax and take it easy.  Do NOT make any important or legal decisions.  Do NOT drive or operate machines at home or at work.  Do NOT drink alcohol.    Care of Puncture Sites:    For the first 24 hrs - check the puncture site every 1-2 hours while awake.  For 2 days, when you cough, sneeze, laugh or move your bowels, hold your hand over the puncture site and press firmly.  Remove the dressing(s) after 24 hours recommend taking off in shower. If there is minor oozing, apply a bandaid and remove it after 12 hours.  It is normal to have bruising or pea sized lump or soreness at the site.  You may shower tomorrow. Do NOT take a bath, or use a hot tub or pool for at least 3 days. Do NOT scrub the site. Do not use lotion or powder near the puncture site.      Activity - For 3 days:    No stooping or squatting  Do NOT do any heavy activity such as exercise, lifting, or straining.   Do NOT do housework, yard work or any activity that make you sweat  Do NOT lift more than 10 pounds  Limit going up and down the stairs repetitively, for the first 24 hours after procedure.       Bleeding:     If you start bleeding from the site in your groin/chest, lie down flat and press firmly on the site for 10 minutes.   Once bleeding stops, lay flat for 2 hours.  Call [facility] as soon as you can.       Call 911 right away if you have heavy bleeding or bleeding that does not stop.      Medicines:    Take your medications, including blood thinners, unless your provider tells you not to.  If you have stopped any medicines, check with your provider about when to restart them.  If you have pain or shortness of breath, you may take Advil (ibuprofen) or Tylenol (acetaminophen).      Follow Up Appointments:    An appointment has been  set up for you for follow-up care  You will receive a phone call the following day/Monday from an RN - Lala Santos Kathy or Allyson.    Call the clinic if:    You have increased pain or a large or growing hard lump around the site.  The site is red, swollen, hot or tender.  Blood or fluid is draining from the site.  You have chills or a fever greater than 101 F (38 C).  Your leg feels numb, cool or changes color.  Increased pain in the chest and/or groin.  Increased shortness of breath  Chest pain not relieved by Tylenol or Advil  New pain in the back or belly that you cannot control with Tylenol.  Recurrent irregular or fast heart rate lasting over 2 hours.  Any questions or concerns.    Heart rhythms:  You may have some irregular heartbeats. These feel very strong. They may make you feel that the fast heart rhythm is going to start again.  Give it time. The irregular beats should occur less often.       [facility]:    [phone] ( 8am-5pm M-F)  Lala Santos Kathy or Allyson     [phone] option 2 (7 days a week)  on call Cardiologist    no

## 2025-02-27 ENCOUNTER — TELEPHONE (OUTPATIENT)
Dept: CARDIOLOGY | Facility: CLINIC | Age: 85
End: 2025-02-27
Payer: COMMERCIAL

## 2025-02-27 NOTE — TELEPHONE ENCOUNTER
Message left for patient in follow up to SVT ablation.  Waiting return call from patient.  BRET Bai

## 2025-04-11 ENCOUNTER — OFFICE VISIT (OUTPATIENT)
Dept: PEDIATRICS | Facility: CLINIC | Age: 85
End: 2025-04-11
Payer: COMMERCIAL

## 2025-04-11 VITALS
TEMPERATURE: 97.7 F | HEIGHT: 68 IN | RESPIRATION RATE: 16 BRPM | BODY MASS INDEX: 24.74 KG/M2 | OXYGEN SATURATION: 97 % | WEIGHT: 163.2 LBS | HEART RATE: 61 BPM | SYSTOLIC BLOOD PRESSURE: 107 MMHG | DIASTOLIC BLOOD PRESSURE: 61 MMHG

## 2025-04-11 DIAGNOSIS — G89.29 CHRONIC RIGHT-SIDED LOW BACK PAIN WITHOUT SCIATICA: ICD-10-CM

## 2025-04-11 DIAGNOSIS — N63.42 SUBAREOLAR MASS OF LEFT BREAST: Primary | ICD-10-CM

## 2025-04-11 DIAGNOSIS — M54.50 CHRONIC RIGHT-SIDED LOW BACK PAIN WITHOUT SCIATICA: ICD-10-CM

## 2025-04-11 DIAGNOSIS — R20.0 LEFT ARM NUMBNESS: ICD-10-CM

## 2025-04-11 PROCEDURE — 99214 OFFICE O/P EST MOD 30 MIN: CPT | Performed by: INTERNAL MEDICINE

## 2025-04-11 PROCEDURE — 3078F DIAST BP <80 MM HG: CPT | Performed by: INTERNAL MEDICINE

## 2025-04-11 PROCEDURE — 1126F AMNT PAIN NOTED NONE PRSNT: CPT | Performed by: INTERNAL MEDICINE

## 2025-04-11 PROCEDURE — 3074F SYST BP LT 130 MM HG: CPT | Performed by: INTERNAL MEDICINE

## 2025-04-11 SDOH — HEALTH STABILITY: PHYSICAL HEALTH: ON AVERAGE, HOW MANY DAYS PER WEEK DO YOU ENGAGE IN MODERATE TO STRENUOUS EXERCISE (LIKE A BRISK WALK)?: 2 DAYS

## 2025-04-11 SDOH — HEALTH STABILITY: PHYSICAL HEALTH: ON AVERAGE, HOW MANY MINUTES DO YOU ENGAGE IN EXERCISE AT THIS LEVEL?: 30 MIN

## 2025-04-11 ASSESSMENT — SOCIAL DETERMINANTS OF HEALTH (SDOH): HOW OFTEN DO YOU GET TOGETHER WITH FRIENDS OR RELATIVES?: TWICE A WEEK

## 2025-04-11 ASSESSMENT — PAIN SCALES - GENERAL: PAINLEVEL_OUTOF10: NO PAIN (0)

## 2025-04-11 NOTE — PROGRESS NOTES
Assessment & Plan       ICD-10-CM    1. Subareolar mass of left breast  N63.42 MA Serafin Post US Clip Placement Right     US Breast Left Limited 1-3 Quadrants      2. Left arm numbness  R20.0       3. Chronic right-sided low back pain without sciatica  M54.50     G89.29         Left breast area mass.  Unclear cause.  Could be a cyst versus benign gynecomastia versus another type of lesion.  Mammogram and ultrasound is ordered.  Will follow-up based on results from testing.    Left arm numbness.  Exact etiology is not clear.  This appears to be more of a shoulder related issue would consider shoulder arthritis as a more likely cause causing nerve impingement.  Does not have any cervical findings on exam today.  We discussed management options.  For now we will continue with conservative management at home.  If things worsen he will contact me for either physical therapy referral or consideration of orthopedic versus neurology evaluation.    Shayan Dixon MD        Subjective   Cy is a 84 year old, presenting for the following health issues:  Derm Problem (Skin irritation/tenderness on left breast ) and Numbness (Left arm/)        4/11/2025    10:01 AM   Additional Questions   Roomed by [facility]       HPI          Concern - breast issue   Onset: started about 6-8 weeks ago   Description: tenderness, pain   Intensity: mild  Progression of Symptoms:  same  Accompanying Signs & Symptoms: none   Previous history of similar problem: none   Precipitating factors:        Worsened by: brushing unit(s) against it.   Alleviating factors:        Improved by: none   Therapies tried and outcome: None      Concern - numbness   Onset: noticed about 2 weeks ago   Description: numbness on the left arm that occurs about once a day; starts near the shoulder and radiates down arm; denies pain   Intensity: mild/moderate   Progression of Symptoms:  intermittent, lasts about a few minutes   Accompanying Signs & Symptoms:   Previous history of  "similar problem: no   Precipitating factors:        Worsened by: none   Alleviating factors:        Improved by: raising the left arm above head seems to help  Therapies tried and outcome:  none     [place] is here today for evaluation of a few different health issues.    First has a lesion under the left nipple.  He first noticed that about a month and 1/2 to 2 months ago.  He has some tenderness in that area with palpation or pressure.  No nipple discharge.  No injury recalled.  No family history of breast cancer.    Also has intermittent numbness in the left arm.  Description is as above.  No injury recalled to the neck or shoulder or arm area.  He has no other areas of numbness.  Does tend to radiate from the shoulder all the way down the arm toward the hand.    Currently has chronic low back pain.  No significant neurologic symptoms in the lower extremities.  No red flag symptoms.  Not currently take prescription medications for his back pain.      Objective    /61 (BP Location: Right arm, Patient Position: Sitting, Cuff Size: Adult Regular)   Pulse 61   Temp 97.7  F (36.5  C) (Temporal)   Resp 16   Ht 1.727 m (5' 8\")   Wt 74 kg (163 lb 3.2 oz)   SpO2 97%   BMI 24.81 kg/m    Body mass index is 24.81 kg/m .  Physical Exam   GEN: No distress  HEENT: PERRL. EOMI. TM's clear bilaterally. Nasal mucosa normal. OP moist without lesions.  NECK: Supple. No LAD or TM.  Nontender with palpation along the cervical spine and paracervical region..  LUNGS: Clear to auscultation bilaterally. No rhonchi, rales, wheezes or retractions.  CV: Regular rate and rhythm.  No murmurs, rubs or gallops. Pulses 2+ radial.  BREAST: Approximate 1 cm diameter smooth with slightly tender mass palpated under the left areola.  No other breast lesions are appreciated.  No lymphadenopathy appreciated.  MS: Nontender with palpation of the left shoulder.  No effusion noted.  No point tenderness along the upper arm.  Rotator cuff is " clinically intact.  No shoulder impingement is appreciated.  NEURO: Strength and sensation are grossly intact in the upper extremities.            Signed Electronically by: Shayan Dixon MD

## 2025-04-16 ENCOUNTER — HOSPITAL ENCOUNTER (OUTPATIENT)
Dept: MAMMOGRAPHY | Facility: CLINIC | Age: 85
Discharge: HOME | End: 2025-04-16
Attending: INTERNAL MEDICINE
Payer: COMMERCIAL

## 2025-04-16 ENCOUNTER — HOSPITAL ENCOUNTER (OUTPATIENT)
Dept: ULTRASOUND IMAGING | Facility: CLINIC | Age: 85
Discharge: HOME | End: 2025-04-16
Attending: INTERNAL MEDICINE
Payer: COMMERCIAL

## 2025-04-16 DIAGNOSIS — N63.42 SUBAREOLAR MASS OF LEFT BREAST: ICD-10-CM

## 2025-04-16 PROCEDURE — 77066 DX MAMMO INCL CAD BI: CPT

## 2025-04-16 PROCEDURE — 76642 ULTRASOUND BREAST LIMITED: CPT | Mod: LT

## 2025-08-14 ENCOUNTER — OFFICE VISIT (OUTPATIENT)
Dept: PEDIATRICS | Facility: CLINIC | Age: 85
End: 2025-08-14
Payer: COMMERCIAL

## 2025-08-14 VITALS
TEMPERATURE: 97.6 F | RESPIRATION RATE: 24 BRPM | OXYGEN SATURATION: 98 % | HEART RATE: 60 BPM | HEIGHT: 68 IN | SYSTOLIC BLOOD PRESSURE: 99 MMHG | DIASTOLIC BLOOD PRESSURE: 56 MMHG | WEIGHT: 166.8 LBS | BODY MASS INDEX: 25.28 KG/M2

## 2025-08-14 DIAGNOSIS — J06.9 UPPER RESPIRATORY TRACT INFECTION, UNSPECIFIED TYPE: Primary | ICD-10-CM

## 2025-08-14 PROCEDURE — 99213 OFFICE O/P EST LOW 20 MIN: CPT | Mod: GE

## 2025-08-14 PROCEDURE — G2211 COMPLEX E/M VISIT ADD ON: HCPCS

## 2025-08-14 PROCEDURE — 3078F DIAST BP <80 MM HG: CPT

## 2025-08-14 PROCEDURE — 1126F AMNT PAIN NOTED NONE PRSNT: CPT

## 2025-08-14 PROCEDURE — 3074F SYST BP LT 130 MM HG: CPT

## 2025-08-14 ASSESSMENT — PAIN SCALES - GENERAL: PAINLEVEL_OUTOF10: NO PAIN (0)

## 2025-08-26 ENCOUNTER — OFFICE VISIT (OUTPATIENT)
Dept: DERMATOLOGY | Facility: CLINIC | Age: 85
End: 2025-08-26
Payer: COMMERCIAL

## 2025-08-26 DIAGNOSIS — Z85.820 HISTORY OF MELANOMA: Primary | ICD-10-CM

## 2025-08-26 DIAGNOSIS — L82.1 SEBORRHEIC KERATOSIS: ICD-10-CM

## 2025-08-26 DIAGNOSIS — L57.0 AK (ACTINIC KERATOSIS): ICD-10-CM

## 2025-08-26 DIAGNOSIS — L81.4 SOLAR LENTIGINOSIS: ICD-10-CM

## 2025-08-26 PROCEDURE — 17003 DESTRUCT PREMALG LES 2-14: CPT | Performed by: DERMATOLOGY

## 2025-08-26 PROCEDURE — 17000 DESTRUCT PREMALG LESION: CPT | Performed by: DERMATOLOGY

## 2025-08-26 PROCEDURE — 99213 OFFICE O/P EST LOW 20 MIN: CPT | Mod: 25 | Performed by: DERMATOLOGY

## (undated) DEVICE — DEFIB PRO-PADZ LVP LQD GEL ADULT 8900-2105-01

## (undated) DEVICE — INTRO SHEATH 7FRX10CM PINNACLE RSS702

## (undated) DEVICE — CATH EP EZ STEER NAV 4MMX115CM 1-7-4 J-J CURVE BN7TCJJ4L

## (undated) DEVICE — CATH EP 7FR X 115CM DECANAV CA

## (undated) DEVICE — DEVICE CLOSURE VASCADE PERCUTANEOUS 800-612C-10U

## (undated) DEVICE — INTRO CATH 12CM 8.5FR FST-CATH

## (undated) DEVICE — PATCH CARTO 3 EXTERNAL REFERENCE 3D MAPPING CREFP6

## (undated) DEVICE — PACK EP SRG PROC LF DISP SAN32EPFSR

## (undated) DEVICE — INTRO SHEALTH 8.5FRX63CM SRO 406853

## (undated) RX ORDER — LIDOCAINE HYDROCHLORIDE 10 MG/ML
INJECTION, SOLUTION EPIDURAL; INFILTRATION; INTRACAUDAL; PERINEURAL
Status: DISPENSED
Start: 2025-02-26

## (undated) RX ORDER — FENTANYL CITRATE 50 UG/ML
INJECTION, SOLUTION INTRAMUSCULAR; INTRAVENOUS
Status: DISPENSED
Start: 2025-02-26

## (undated) RX ORDER — HEPARIN SODIUM 1000 [USP'U]/ML
INJECTION, SOLUTION INTRAVENOUS; SUBCUTANEOUS
Status: DISPENSED
Start: 2025-02-26